# Patient Record
Sex: MALE | Race: BLACK OR AFRICAN AMERICAN | NOT HISPANIC OR LATINO | Employment: UNEMPLOYED | URBAN - NONMETROPOLITAN AREA
[De-identification: names, ages, dates, MRNs, and addresses within clinical notes are randomized per-mention and may not be internally consistent; named-entity substitution may affect disease eponyms.]

---

## 2022-03-14 ENCOUNTER — HISTORICAL (OUTPATIENT)
Dept: ADMINISTRATIVE | Facility: HOSPITAL | Age: 57
End: 2022-03-14

## 2023-05-16 ENCOUNTER — HOSPITAL ENCOUNTER (OUTPATIENT)
Dept: WOUND CARE | Facility: HOSPITAL | Age: 58
Discharge: HOME | End: 2023-05-16
Attending: NURSE PRACTITIONER
Payer: MEDICAID

## 2023-05-16 ENCOUNTER — HOSPITAL ENCOUNTER (INPATIENT)
Facility: HOSPITAL | Age: 58
LOS: 9 days | Discharge: NURSING, REHAB OR LONG-TERM CARE | DRG: 853 | End: 2023-05-25
Attending: FAMILY MEDICINE | Admitting: FAMILY MEDICINE
Payer: MEDICAID

## 2023-05-16 VITALS — HEART RATE: 104 BPM | DIASTOLIC BLOOD PRESSURE: 74 MMHG | RESPIRATION RATE: 18 BRPM | SYSTOLIC BLOOD PRESSURE: 144 MMHG

## 2023-05-16 DIAGNOSIS — L97.412 NON-PRESSURE CHRONIC ULCER OF RIGHT HEEL AND MIDFOOT WITH FAT LAYER EXPOSED: ICD-10-CM

## 2023-05-16 DIAGNOSIS — L97.412 NON-PRESSURE CHRONIC ULCER OF RIGHT HEEL AND MIDFOOT WITH FAT LAYER EXPOSED: Primary | ICD-10-CM

## 2023-05-16 DIAGNOSIS — R06.00 DYSPNEA: ICD-10-CM

## 2023-05-16 DIAGNOSIS — T25.021A BURN OF RIGHT FOOT, UNSPECIFIED BURN DEGREE, INITIAL ENCOUNTER: ICD-10-CM

## 2023-05-16 DIAGNOSIS — L97.412 ULCER OF RIGHT HEEL, WITH FAT LAYER EXPOSED: ICD-10-CM

## 2023-05-16 DIAGNOSIS — E08.621 DIABETIC ULCER OF MIDFOOT ASSOCIATED WITH DIABETES MELLITUS DUE TO UNDERLYING CONDITION, WITH FAT LAYER EXPOSED, UNSPECIFIED LATERALITY: ICD-10-CM

## 2023-05-16 DIAGNOSIS — I48.91 A-FIB: ICD-10-CM

## 2023-05-16 DIAGNOSIS — A41.9 SEPSIS: ICD-10-CM

## 2023-05-16 DIAGNOSIS — R00.0 TACHYCARDIA: ICD-10-CM

## 2023-05-16 DIAGNOSIS — A41.9 SEPSIS WITHOUT ACUTE ORGAN DYSFUNCTION, DUE TO UNSPECIFIED ORGANISM: ICD-10-CM

## 2023-05-16 DIAGNOSIS — L97.402 DIABETIC ULCER OF MIDFOOT ASSOCIATED WITH DIABETES MELLITUS DUE TO UNDERLYING CONDITION, WITH FAT LAYER EXPOSED, UNSPECIFIED LATERALITY: ICD-10-CM

## 2023-05-16 DIAGNOSIS — I96 GANGRENE: Primary | ICD-10-CM

## 2023-05-16 DIAGNOSIS — R06.02 SOB (SHORTNESS OF BREATH): ICD-10-CM

## 2023-05-16 DIAGNOSIS — T17.800A ASPIRATION INTO LOWER RESPIRATORY TRACT, INITIAL ENCOUNTER: ICD-10-CM

## 2023-05-16 LAB
ABS NEUT CALC (OHS): 10.02 X10(3)/MCL (ref 2.1–9.2)
ALBUMIN SERPL-MCNC: 3.1 G/DL (ref 3.4–5)
ALBUMIN/GLOB SERPL: 0.7 RATIO
ALP SERPL-CCNC: 269 UNIT/L (ref 50–144)
ALT SERPL-CCNC: 30 UNIT/L (ref 1–45)
ANION GAP SERPL CALC-SCNC: 5 MEQ/L (ref 2–13)
AST SERPL-CCNC: 30 UNIT/L (ref 17–59)
BASE EXCESS BLD CALC-SCNC: 5.9 MMOL/L (ref -2–2)
BILIRUBIN DIRECT+TOT PNL SERPL-MCNC: 0.6 MG/DL (ref 0–1)
BLOOD GAS SAMPLE TYPE (OHS): ABNORMAL
BUN SERPL-MCNC: 18 MG/DL (ref 7–20)
C-REACTIVE PROTEIN(NORTH LA, ST. MARY, RP & JENNINGS): 17.9 MG/DL (ref 0–0.9)
CALCIUM SERPL-MCNC: 9.1 MG/DL (ref 8.4–10.2)
CHLORIDE SERPL-SCNC: 102 MMOL/L (ref 98–110)
CO2 SERPL-SCNC: 31 MMOL/L (ref 21–32)
CREAT SERPL-MCNC: 0.73 MG/DL (ref 0.66–1.25)
CREAT/UREA NIT SERPL: 25 (ref 12–20)
ERYTHROCYTE [DISTWIDTH] IN BLOOD BY AUTOMATED COUNT: 22.8 %
FIO2 BLOOD GAS (OHS): 36 %
GFR SERPLBLD CREATININE-BSD FMLA CKD-EPI: >90 MLS/MIN/1.73/M2
GLOBULIN SER-MCNC: 4.3 GM/DL (ref 2–3.9)
GLUCOSE SERPL-MCNC: 126 MG/DL (ref 70–115)
HCO3 BLDA-SCNC: 29.6 MMOL/L (ref 22–26)
HCT VFR BLD AUTO: 28.5 % (ref 36–52)
HGB BLD-MCNC: 8.6 G/DL (ref 13–18)
IMM GRANULOCYTES # BLD AUTO: 0.06 X10(3)/MCL (ref 0–0.03)
IMM GRANULOCYTES NFR BLD AUTO: 0.5 % (ref 0–0.5)
LACTATE SERPL-SCNC: 1.4 MMOL/L (ref 0.4–2)
LPM (OHS): 4
LYMPHOCYTES NFR BLD MANUAL: 1.47 X10(3)/MCL
LYMPHOCYTES NFR BLD MANUAL: 12 % (ref 13–40)
MCH RBC QN AUTO: 24.3 PG (ref 27–34)
MCHC RBC AUTO-ENTMCNC: 30.2 G/DL (ref 31–37)
MCV RBC AUTO: 80.5 FL (ref 79–99)
METHGB MFR BLDA: ABNORMAL %
MONOCYTES NFR BLD MANUAL: 0.73 X10(3)/MCL (ref 0.1–1.3)
MONOCYTES NFR BLD MANUAL: 6 % (ref 2–11)
NEUTROPHILS NFR BLD MANUAL: 82 % (ref 47–80)
NRBC BLD AUTO-RTO: 0 %
OXYGEN DEVICE BLOOD GAS (OHS): ABNORMAL
PCO2 BLDA: 38.7 MMHG (ref 35–45)
PH BLDA: 7.49 [PH] (ref 7.35–7.45)
PLATELET # BLD AUTO: 595 X10(3)/MCL (ref 140–371)
PLATELET # BLD EST: ABNORMAL 10*3/UL
PMV BLD AUTO: ABNORMAL FL
PO2 BLDA: 52.1 MMHG (ref 80–105)
POIKILOCYTOSIS BLD QL SMEAR: ABNORMAL
POTASSIUM SERPL-SCNC: 3.9 MMOL/L (ref 3.5–5.1)
PROT SERPL-MCNC: 7.4 GM/DL (ref 6.3–8.2)
RBC # BLD AUTO: 3.54 X10(6)/MCL (ref 4–6)
RBC MORPH BLD: ABNORMAL
SAO2 % BLDA: 87.6 % (ref 95–100)
SODIUM SERPL-SCNC: 138 MMOL/L (ref 135–145)
WBC # SPEC AUTO: 12.22 X10(3)/MCL (ref 4–11.5)

## 2023-05-16 PROCEDURE — 80053 COMPREHEN METABOLIC PANEL: CPT | Performed by: FAMILY MEDICINE

## 2023-05-16 PROCEDURE — 85025 COMPLETE CBC W/AUTO DIFF WBC: CPT | Performed by: FAMILY MEDICINE

## 2023-05-16 PROCEDURE — 83605 ASSAY OF LACTIC ACID: CPT | Performed by: FAMILY MEDICINE

## 2023-05-16 PROCEDURE — 99203 OFFICE O/P NEW LOW 30 MIN: CPT | Mod: 25

## 2023-05-16 PROCEDURE — 96365 THER/PROPH/DIAG IV INF INIT: CPT

## 2023-05-16 PROCEDURE — 87186 SC STD MICRODIL/AGAR DIL: CPT | Performed by: FAMILY MEDICINE

## 2023-05-16 PROCEDURE — 96375 TX/PRO/DX INJ NEW DRUG ADDON: CPT

## 2023-05-16 PROCEDURE — 93010 EKG 12-LEAD: ICD-10-PCS | Mod: ,,, | Performed by: INTERNAL MEDICINE

## 2023-05-16 PROCEDURE — 25000003 PHARM REV CODE 250: Performed by: FAMILY MEDICINE

## 2023-05-16 PROCEDURE — 93010 ELECTROCARDIOGRAM REPORT: CPT | Mod: ,,, | Performed by: INTERNAL MEDICINE

## 2023-05-16 PROCEDURE — 86140 C-REACTIVE PROTEIN: CPT | Performed by: FAMILY MEDICINE

## 2023-05-16 PROCEDURE — 99285 EMERGENCY DEPT VISIT HI MDM: CPT | Mod: 25,27

## 2023-05-16 PROCEDURE — 63600175 PHARM REV CODE 636 W HCPCS: Performed by: FAMILY MEDICINE

## 2023-05-16 PROCEDURE — 27000999 HC MEDICAL RECORD PHOTO DOCUMENTATION

## 2023-05-16 PROCEDURE — 25500020 PHARM REV CODE 255: Performed by: FAMILY MEDICINE

## 2023-05-16 PROCEDURE — 87154 CUL TYP ID BLD PTHGN 6+ TRGT: CPT | Performed by: FAMILY MEDICINE

## 2023-05-16 PROCEDURE — 20000000 HC ICU ROOM

## 2023-05-16 PROCEDURE — 93005 ELECTROCARDIOGRAM TRACING: CPT

## 2023-05-16 PROCEDURE — 36415 COLL VENOUS BLD VENIPUNCTURE: CPT | Performed by: FAMILY MEDICINE

## 2023-05-16 PROCEDURE — 85651 RBC SED RATE NONAUTOMATED: CPT | Performed by: FAMILY MEDICINE

## 2023-05-16 PROCEDURE — 85027 COMPLETE CBC AUTOMATED: CPT | Performed by: FAMILY MEDICINE

## 2023-05-16 RX ORDER — CLONIDINE HYDROCHLORIDE 0.1 MG/1
0.1 TABLET ORAL EVERY 4 HOURS PRN
Status: ACTIVE | OUTPATIENT
Start: 2023-05-16 | End: 2023-05-17

## 2023-05-16 RX ORDER — ONDANSETRON 2 MG/ML
8 INJECTION INTRAMUSCULAR; INTRAVENOUS
Status: COMPLETED | OUTPATIENT
Start: 2023-05-16 | End: 2023-05-16

## 2023-05-16 RX ORDER — GLUCAGON 1 MG
1 KIT INJECTION
Status: DISCONTINUED | OUTPATIENT
Start: 2023-05-16 | End: 2023-05-17

## 2023-05-16 RX ORDER — CLINDAMYCIN PHOSPHATE 900 MG/50ML
900 INJECTION, SOLUTION INTRAVENOUS
Status: DISCONTINUED | OUTPATIENT
Start: 2023-05-16 | End: 2023-05-17

## 2023-05-16 RX ORDER — ACETAMINOPHEN 325 MG/1
650 TABLET ORAL EVERY 8 HOURS PRN
Status: DISCONTINUED | OUTPATIENT
Start: 2023-05-16 | End: 2023-05-25 | Stop reason: HOSPADM

## 2023-05-16 RX ORDER — ONDANSETRON 2 MG/ML
4 INJECTION INTRAMUSCULAR; INTRAVENOUS EVERY 6 HOURS PRN
Status: DISCONTINUED | OUTPATIENT
Start: 2023-05-16 | End: 2023-05-25 | Stop reason: HOSPADM

## 2023-05-16 RX ORDER — PROCHLORPERAZINE EDISYLATE 5 MG/ML
10 INJECTION INTRAMUSCULAR; INTRAVENOUS EVERY 6 HOURS PRN
Status: DISCONTINUED | OUTPATIENT
Start: 2023-05-16 | End: 2023-05-25 | Stop reason: HOSPADM

## 2023-05-16 RX ORDER — TALC
6 POWDER (GRAM) TOPICAL NIGHTLY PRN
Status: DISCONTINUED | OUTPATIENT
Start: 2023-05-16 | End: 2023-05-25 | Stop reason: HOSPADM

## 2023-05-16 RX ORDER — ACETAMINOPHEN 500 MG
1000 TABLET ORAL
Status: COMPLETED | OUTPATIENT
Start: 2023-05-16 | End: 2023-05-16

## 2023-05-16 RX ORDER — KETOROLAC TROMETHAMINE 30 MG/ML
30 INJECTION, SOLUTION INTRAMUSCULAR; INTRAVENOUS
Status: COMPLETED | OUTPATIENT
Start: 2023-05-16 | End: 2023-05-16

## 2023-05-16 RX ORDER — SODIUM CHLORIDE 450 MG/100ML
INJECTION, SOLUTION INTRAVENOUS
Status: COMPLETED | OUTPATIENT
Start: 2023-05-16 | End: 2023-05-16

## 2023-05-16 RX ADMIN — SODIUM CHLORIDE: 4.5 INJECTION, SOLUTION INTRAVENOUS at 11:05

## 2023-05-16 RX ADMIN — SODIUM CHLORIDE 1000 ML: 9 INJECTION, SOLUTION INTRAVENOUS at 05:05

## 2023-05-16 RX ADMIN — ONDANSETRON 8 MG: 2 INJECTION INTRAMUSCULAR; INTRAVENOUS at 03:05

## 2023-05-16 RX ADMIN — PIPERACILLIN AND TAZOBACTAM 4.5 G: 4; .5 INJECTION, POWDER, FOR SOLUTION INTRAVENOUS; PARENTERAL at 03:05

## 2023-05-16 RX ADMIN — KETOROLAC TROMETHAMINE 30 MG: 30 INJECTION, SOLUTION INTRAMUSCULAR at 04:05

## 2023-05-16 RX ADMIN — SODIUM CHLORIDE 1000 ML: 9 INJECTION, SOLUTION INTRAVENOUS at 03:05

## 2023-05-16 RX ADMIN — CLINDAMYCIN PHOSPHATE 900 MG: 900 INJECTION, SOLUTION INTRAVENOUS at 09:05

## 2023-05-16 RX ADMIN — IOPAMIDOL 92 ML: 755 INJECTION, SOLUTION INTRAVENOUS at 06:05

## 2023-05-16 RX ADMIN — ACETAMINOPHEN 1000 MG: 500 TABLET, FILM COATED ORAL at 03:05

## 2023-05-16 RX ADMIN — PIPERACILLIN AND TAZOBACTAM 4.5 G: 4; .5 INJECTION, POWDER, FOR SOLUTION INTRAVENOUS; PARENTERAL at 11:05
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
## 2023-05-17 ENCOUNTER — ANESTHESIA EVENT (OUTPATIENT)
Dept: SURGERY | Facility: HOSPITAL | Age: 58
DRG: 853 | End: 2023-05-17
Payer: MEDICAID

## 2023-05-17 ENCOUNTER — ANESTHESIA (OUTPATIENT)
Dept: SURGERY | Facility: HOSPITAL | Age: 58
DRG: 853 | End: 2023-05-17
Payer: MEDICAID

## 2023-05-17 PROBLEM — I96 GANGRENE: Status: ACTIVE | Noted: 2023-05-17

## 2023-05-17 LAB
ABO + RH BLD: NORMAL
ABO AND RH: NORMAL
ABORH RETYPE: NORMAL
ACINETOBACTER CALCOACETICUS-BAUMANNII COMPLEX (OHS): NOT DETECTED
ALBUMIN SERPL-MCNC: 2.3 G/DL (ref 3.4–5)
ALBUMIN/GLOB SERPL: 0.7 RATIO
ALP SERPL-CCNC: 239 UNIT/L (ref 50–144)
ALT SERPL-CCNC: 27 UNIT/L (ref 1–45)
ANION GAP SERPL CALC-SCNC: 4 MEQ/L (ref 2–13)
ANISOCYTOSIS BLD QL SMEAR: ABNORMAL
ANTIBODY SCREEN: NORMAL
AST SERPL-CCNC: 34 UNIT/L (ref 17–59)
BACTEROIDES FRAGILIS (OHS): NOT DETECTED
BASOPHILS # BLD AUTO: 0.05 X10(3)/MCL (ref 0.01–0.08)
BASOPHILS NFR BLD AUTO: 0.3 % (ref 0.1–1.2)
BILIRUBIN DIRECT+TOT PNL SERPL-MCNC: 0.5 MG/DL (ref 0–1)
BLD PROD TYP BPU: NORMAL
BLOOD UNIT EXPIRATION DATE: NORMAL
BLOOD UNIT TYPE CODE: 6200
BUN SERPL-MCNC: 18 MG/DL (ref 7–20)
C AURIS DNA BLD POS QL NAA+NON-PROBE: NOT DETECTED
C GATTII+NEOFOR DNA CSF QL NAA+NON-PROBE: NOT DETECTED
C-REACTIVE PROTEIN(NORTH LA, ST. MARY, RP & JENNINGS): 17.5 MG/DL (ref 0–0.9)
CALCIUM SERPL-MCNC: 8 MG/DL (ref 8.4–10.2)
CANDIDA ALBICANS (OHS): NOT DETECTED
CANDIDA GLABRATA (OHS): NOT DETECTED
CANDIDA KRUSEI (OHS): NOT DETECTED
CANDIDA PARAPSILOSIS (OHS): NOT DETECTED
CANDIDA TROPICALIS (OHS): NOT DETECTED
CHLORIDE SERPL-SCNC: 103 MMOL/L (ref 98–110)
CO2 SERPL-SCNC: 30 MMOL/L (ref 21–32)
CREAT SERPL-MCNC: 0.84 MG/DL (ref 0.66–1.25)
CREAT/UREA NIT SERPL: 21 (ref 12–20)
CROSSMATCH INTERPRETATION: NORMAL
CTX-M (OHS): NOT DETECTED
DISPENSE STATUS: NORMAL
ENTEROBACTER CLOACAE COMPLEX (OHS): NOT DETECTED
ENTEROBACTERALES (OHS): DETECTED
ENTEROCOCCUS FAECALIS (OHS): NOT DETECTED
ENTEROCOCCUS FAECIUM (OHS): NOT DETECTED
EOSINOPHIL # BLD AUTO: 0.11 X10(3)/MCL (ref 0.04–0.54)
EOSINOPHIL NFR BLD AUTO: 0.7 % (ref 0.7–7)
ERYTHROCYTE [DISTWIDTH] IN BLOOD BY AUTOMATED COUNT: 22.8 %
ERYTHROCYTE [SEDIMENTATION RATE] IN BLOOD: 95 MM/HR (ref 0–15)
ESCHERICHIA COLI (OHS): NOT DETECTED
EST. AVERAGE GLUCOSE BLD GHB EST-MCNC: 111.2 MG/DL (ref 70–115)
GFR SERPLBLD CREATININE-BSD FMLA CKD-EPI: >90 MLS/MIN/1.73/M2
GLOBULIN SER-MCNC: 3.4 GM/DL (ref 2–3.9)
GLUCOSE SERPL-MCNC: 187 MG/DL (ref 70–115)
GP B STREP DNA CSF QL NAA+NON-PROBE: NOT DETECTED
HAEM INFLU DNA CSF QL NAA+NON-PROBE: NOT DETECTED
HBA1C MFR BLD: 5.5 % (ref 4–6)
HCT VFR BLD AUTO: 24.4 % (ref 36–52)
HGB BLD-MCNC: 7.3 G/DL (ref 13–18)
IMM GRANULOCYTES # BLD AUTO: 0.08 X10(3)/MCL (ref 0–0.03)
IMM GRANULOCYTES NFR BLD AUTO: 0.5 % (ref 0–0.5)
IMP (OHS): NOT DETECTED
KLEBSIELLA AEROGENES (OHS): NOT DETECTED
KLEBSIELLA OXYTOCA (OHS): NOT DETECTED
KLEBSIELLA PNEUMONIAE GROUP (OHS): NOT DETECTED
KPC (OHS): NOT DETECTED
L MONOCYTOG DNA CSF QL NAA+NON-PROBE: NOT DETECTED
LYMPHOCYTES # BLD AUTO: 1 X10(3)/MCL (ref 1.32–3.57)
LYMPHOCYTES NFR BLD AUTO: 5.9 % (ref 20–55)
MCH RBC QN AUTO: 24.3 PG (ref 27–34)
MCHC RBC AUTO-ENTMCNC: 29.9 G/DL (ref 31–37)
MCR-1 (OHS): ABNORMAL
MCV RBC AUTO: 81.3 FL (ref 79–99)
MECA/C (OHS): ABNORMAL
MECA/C AND MREJ (MRSA)(OHS): ABNORMAL
MONOCYTES # BLD AUTO: 0.76 X10(3)/MCL (ref 0.3–0.82)
MONOCYTES NFR BLD AUTO: 4.5 % (ref 4.7–12.5)
N MEN DNA CSF QL NAA+NON-PROBE: NOT DETECTED
NDM (OHS): NOT DETECTED
NEUTROPHILS # BLD AUTO: 14.83 X10(3)/MCL (ref 1.78–5.38)
NEUTROPHILS NFR BLD AUTO: 88.1 % (ref 37–73)
NRBC BLD AUTO-RTO: 0 %
OXA-48-LIKE (OHS): NOT DETECTED
PLATELET # BLD AUTO: 499 X10(3)/MCL (ref 140–371)
PLATELET # BLD EST: ABNORMAL 10*3/UL
PMV BLD AUTO: ABNORMAL FL
POCT GLUCOSE: 124 MG/DL (ref 70–110)
POCT GLUCOSE: 145 MG/DL (ref 70–110)
POCT GLUCOSE: 191 MG/DL (ref 70–110)
POIKILOCYTOSIS BLD QL SMEAR: ABNORMAL
POTASSIUM SERPL-SCNC: 3.7 MMOL/L (ref 3.5–5.1)
PROT SERPL-MCNC: 5.7 GM/DL (ref 6.3–8.2)
PROTEUS SPP. (OHS): DETECTED
PSEUDOMONAS AERUGINOSA (OHS): NOT DETECTED
RBC # BLD AUTO: 3 X10(6)/MCL (ref 4–6)
S ENT+BONG DNA STL QL NAA+NON-PROBE: NOT DETECTED
S PNEUM DNA CSF QL NAA+NON-PROBE: NOT DETECTED
SERRATIA MARCESCENS (OHS): NOT DETECTED
SODIUM SERPL-SCNC: 137 MMOL/L (ref 135–145)
SPECIMEN OUTDATE: NORMAL
STAPHYLOCOCCUS AUREUS (OHS): NOT DETECTED
STAPHYLOCOCCUS EPIDERMIDIS (OHS): NOT DETECTED
STAPHYLOCOCCUS LUGDUNENSIS (OHS): NOT DETECTED
STAPHYLOCOCCUS SPP. (OHS): NOT DETECTED
STENOTROPHOMONAS MALTOPHILIA (OHS): NOT DETECTED
STREPTOCOCCUS PYOGENES (GROUP A)(OHS): NOT DETECTED
STREPTOCOCCUS SPP. (OHS): NOT DETECTED
TSH SERPL-ACNC: 2.05 UIU/ML (ref 0.36–3.74)
UNIT NUMBER: NORMAL
VANA/B (OHS): ABNORMAL
VIM (OHS): NOT DETECTED
WBC # SPEC AUTO: 16.83 X10(3)/MCL (ref 4–11.5)

## 2023-05-17 PROCEDURE — 63600175 PHARM REV CODE 636 W HCPCS: Performed by: FAMILY MEDICINE

## 2023-05-17 PROCEDURE — 99900035 HC TECH TIME PER 15 MIN (STAT)

## 2023-05-17 PROCEDURE — D9220A PRA ANESTHESIA: Mod: ,,, | Performed by: NURSE ANESTHETIST, CERTIFIED REGISTERED

## 2023-05-17 PROCEDURE — 37000009 HC ANESTHESIA EA ADD 15 MINS: Performed by: SURGERY

## 2023-05-17 PROCEDURE — 21400001 HC TELEMETRY ROOM

## 2023-05-17 PROCEDURE — 86920 COMPATIBILITY TEST SPIN: CPT | Performed by: FAMILY MEDICINE

## 2023-05-17 PROCEDURE — 25000003 PHARM REV CODE 250: Performed by: NURSE ANESTHETIST, CERTIFIED REGISTERED

## 2023-05-17 PROCEDURE — 86920 COMPATIBILITY TEST SPIN: CPT | Performed by: SURGERY

## 2023-05-17 PROCEDURE — 80053 COMPREHEN METABOLIC PANEL: CPT | Performed by: FAMILY MEDICINE

## 2023-05-17 PROCEDURE — 25000003 PHARM REV CODE 250: Performed by: FAMILY MEDICINE

## 2023-05-17 PROCEDURE — 36415 COLL VENOUS BLD VENIPUNCTURE: CPT | Performed by: SURGERY

## 2023-05-17 PROCEDURE — 36000706: Performed by: SURGERY

## 2023-05-17 PROCEDURE — 94799 UNLISTED PULMONARY SVC/PX: CPT

## 2023-05-17 PROCEDURE — 83036 HEMOGLOBIN GLYCOSYLATED A1C: CPT | Performed by: SURGERY

## 2023-05-17 PROCEDURE — 36000707: Performed by: SURGERY

## 2023-05-17 PROCEDURE — 27201423 OPTIME MED/SURG SUP & DEVICES STERILE SUPPLY: Performed by: SURGERY

## 2023-05-17 PROCEDURE — 86140 C-REACTIVE PROTEIN: CPT | Performed by: FAMILY MEDICINE

## 2023-05-17 PROCEDURE — 87070 CULTURE OTHR SPECIMN AEROBIC: CPT | Performed by: FAMILY MEDICINE

## 2023-05-17 PROCEDURE — 27000221 HC OXYGEN, UP TO 24 HOURS

## 2023-05-17 PROCEDURE — 87205 SMEAR GRAM STAIN: CPT | Performed by: SURGERY

## 2023-05-17 PROCEDURE — 87070 CULTURE OTHR SPECIMN AEROBIC: CPT | Performed by: SURGERY

## 2023-05-17 PROCEDURE — 94761 N-INVAS EAR/PLS OXIMETRY MLT: CPT

## 2023-05-17 PROCEDURE — 63600175 PHARM REV CODE 636 W HCPCS: Performed by: SURGERY

## 2023-05-17 PROCEDURE — 87075 CULTR BACTERIA EXCEPT BLOOD: CPT | Performed by: SURGERY

## 2023-05-17 PROCEDURE — 36415 COLL VENOUS BLD VENIPUNCTURE: CPT | Performed by: FAMILY MEDICINE

## 2023-05-17 PROCEDURE — 84443 ASSAY THYROID STIM HORMONE: CPT | Performed by: SURGERY

## 2023-05-17 PROCEDURE — 86900 BLOOD TYPING SEROLOGIC ABO: CPT | Performed by: SURGERY

## 2023-05-17 PROCEDURE — 25000003 PHARM REV CODE 250: Performed by: SURGERY

## 2023-05-17 PROCEDURE — P9016 RBC LEUKOCYTES REDUCED: HCPCS | Performed by: SURGERY

## 2023-05-17 PROCEDURE — 85025 COMPLETE CBC W/AUTO DIFF WBC: CPT | Performed by: FAMILY MEDICINE

## 2023-05-17 PROCEDURE — 63600175 PHARM REV CODE 636 W HCPCS: Performed by: NURSE ANESTHETIST, CERTIFIED REGISTERED

## 2023-05-17 PROCEDURE — 37000008 HC ANESTHESIA 1ST 15 MINUTES: Performed by: SURGERY

## 2023-05-17 PROCEDURE — D9220A PRA ANESTHESIA: ICD-10-PCS | Mod: ,,, | Performed by: NURSE ANESTHETIST, CERTIFIED REGISTERED

## 2023-05-17 RX ORDER — MIDAZOLAM HYDROCHLORIDE 1 MG/ML
2 INJECTION INTRAMUSCULAR; INTRAVENOUS
Status: COMPLETED | OUTPATIENT
Start: 2023-05-17 | End: 2023-05-17

## 2023-05-17 RX ORDER — MUPIROCIN 20 MG/G
OINTMENT TOPICAL 2 TIMES DAILY
Status: DISCONTINUED | OUTPATIENT
Start: 2023-05-17 | End: 2023-05-17

## 2023-05-17 RX ORDER — HYDROCODONE BITARTRATE AND ACETAMINOPHEN 500; 5 MG/1; MG/1
TABLET ORAL
Status: DISCONTINUED | OUTPATIENT
Start: 2023-05-17 | End: 2023-05-25

## 2023-05-17 RX ORDER — ACETAMINOPHEN 325 MG/1
650 TABLET ORAL EVERY 8 HOURS PRN
Qty: 1 TABLET | Refills: 0 | Status: SHIPPED | OUTPATIENT
Start: 2023-05-17

## 2023-05-17 RX ORDER — LIDOCAINE HYDROCHLORIDE 10 MG/ML
INJECTION, SOLUTION EPIDURAL; INFILTRATION; INTRACAUDAL; PERINEURAL
Status: COMPLETED | OUTPATIENT
Start: 2023-05-17 | End: 2023-05-17

## 2023-05-17 RX ORDER — IBUPROFEN 200 MG
24 TABLET ORAL
Status: DISCONTINUED | OUTPATIENT
Start: 2023-05-17 | End: 2023-05-21

## 2023-05-17 RX ORDER — SODIUM CHLORIDE 9 MG/ML
INJECTION, SOLUTION INTRAVENOUS CONTINUOUS
Status: DISCONTINUED | OUTPATIENT
Start: 2023-05-17 | End: 2023-05-21

## 2023-05-17 RX ORDER — IBUPROFEN 200 MG
16 TABLET ORAL
Status: DISCONTINUED | OUTPATIENT
Start: 2023-05-17 | End: 2023-05-21

## 2023-05-17 RX ORDER — MUPIROCIN 20 MG/G
1 OINTMENT TOPICAL 2 TIMES DAILY
Status: DISPENSED | OUTPATIENT
Start: 2023-05-17 | End: 2023-05-22

## 2023-05-17 RX ORDER — FAMOTIDINE 20 MG/1
20 TABLET, FILM COATED ORAL
Status: COMPLETED | OUTPATIENT
Start: 2023-05-17 | End: 2023-05-17

## 2023-05-17 RX ORDER — ONDANSETRON 2 MG/ML
INJECTION INTRAMUSCULAR; INTRAVENOUS
Status: DISCONTINUED | OUTPATIENT
Start: 2023-05-17 | End: 2023-05-17

## 2023-05-17 RX ORDER — GLUCAGON 1 MG
1 KIT INJECTION
Status: DISCONTINUED | OUTPATIENT
Start: 2023-05-17 | End: 2023-05-21

## 2023-05-17 RX ORDER — LIDOCAINE HYDROCHLORIDE 10 MG/ML
INJECTION INFILTRATION; PERINEURAL
Status: DISCONTINUED | OUTPATIENT
Start: 2023-05-17 | End: 2023-05-17 | Stop reason: HOSPADM

## 2023-05-17 RX ORDER — TRANEXAMIC ACID 100 MG/ML
INJECTION, SOLUTION INTRAVENOUS
Status: DISCONTINUED | OUTPATIENT
Start: 2023-05-17 | End: 2023-05-17

## 2023-05-17 RX ORDER — PROPOFOL 10 MG/ML
VIAL (ML) INTRAVENOUS
Status: DISCONTINUED | OUTPATIENT
Start: 2023-05-17 | End: 2023-05-17

## 2023-05-17 RX ORDER — FENTANYL CITRATE 50 UG/ML
INJECTION, SOLUTION INTRAMUSCULAR; INTRAVENOUS
Status: DISCONTINUED | OUTPATIENT
Start: 2023-05-17 | End: 2023-05-17

## 2023-05-17 RX ORDER — BUPIVACAINE HYDROCHLORIDE 5 MG/ML
INJECTION, SOLUTION EPIDURAL; INTRACAUDAL
Status: COMPLETED | OUTPATIENT
Start: 2023-05-17 | End: 2023-05-17

## 2023-05-17 RX ORDER — ETOMIDATE 2 MG/ML
INJECTION INTRAVENOUS
Status: DISCONTINUED | OUTPATIENT
Start: 2023-05-17 | End: 2023-05-17

## 2023-05-17 RX ORDER — BUPIVACAINE HYDROCHLORIDE 5 MG/ML
INJECTION, SOLUTION EPIDURAL; INTRACAUDAL
Status: DISCONTINUED | OUTPATIENT
Start: 2023-05-17 | End: 2023-05-17 | Stop reason: HOSPADM

## 2023-05-17 RX ORDER — INSULIN ASPART 100 [IU]/ML
1-10 INJECTION, SOLUTION INTRAVENOUS; SUBCUTANEOUS
Status: DISCONTINUED | OUTPATIENT
Start: 2023-05-17 | End: 2023-05-21

## 2023-05-17 RX ORDER — LIDOCAINE HYDROCHLORIDE 20 MG/ML
INJECTION INTRAVENOUS
Status: DISCONTINUED | OUTPATIENT
Start: 2023-05-17 | End: 2023-05-17

## 2023-05-17 RX ADMIN — PROPOFOL 30 MG: 10 INJECTION, EMULSION INTRAVENOUS at 02:05

## 2023-05-17 RX ADMIN — TRANEXAMIC ACID 1000 MG: 100 INJECTION, SOLUTION INTRAVENOUS at 03:05

## 2023-05-17 RX ADMIN — ETOMIDATE 2 MG: 2 INJECTION INTRAVENOUS at 02:05

## 2023-05-17 RX ADMIN — VANCOMYCIN HYDROCHLORIDE 2000 MG: 1 INJECTION, POWDER, LYOPHILIZED, FOR SOLUTION INTRAVENOUS at 10:05

## 2023-05-17 RX ADMIN — CLINDAMYCIN PHOSPHATE 900 MG: 900 INJECTION, SOLUTION INTRAVENOUS at 04:05

## 2023-05-17 RX ADMIN — BUPIVACAINE HYDROCHLORIDE 10 ML: 5 INJECTION, SOLUTION EPIDURAL; INTRACAUDAL; PERINEURAL at 02:05

## 2023-05-17 RX ADMIN — FENTANYL CITRATE 50 MCG: 50 INJECTION, SOLUTION INTRAMUSCULAR; INTRAVENOUS at 02:05

## 2023-05-17 RX ADMIN — ETOMIDATE 4 MG: 2 INJECTION INTRAVENOUS at 02:05

## 2023-05-17 RX ADMIN — MIDAZOLAM HYDROCHLORIDE 2 MG: 1 INJECTION, SOLUTION INTRAMUSCULAR; INTRAVENOUS at 02:05

## 2023-05-17 RX ADMIN — GLYCOPYRROLATE 0.2 MG: 0.2 INJECTION, SOLUTION INTRAMUSCULAR; INTRAVITREAL at 02:05

## 2023-05-17 RX ADMIN — LIDOCAINE HYDROCHLORIDE 100 MG: 10 INJECTION, SOLUTION EPIDURAL; INFILTRATION; INTRACAUDAL at 02:05

## 2023-05-17 RX ADMIN — PIPERACILLIN AND TAZOBACTAM 4.5 G: 4; .5 INJECTION, POWDER, FOR SOLUTION INTRAVENOUS; PARENTERAL at 10:05

## 2023-05-17 RX ADMIN — MUPIROCIN: 20 OINTMENT TOPICAL at 10:05

## 2023-05-17 RX ADMIN — BUPIVACAINE HYDROCHLORIDE 5 ML: 5 INJECTION, SOLUTION EPIDURAL; INTRACAUDAL; PERINEURAL at 02:05

## 2023-05-17 RX ADMIN — ONDANSETRON 4 MG: 2 INJECTION INTRAMUSCULAR; INTRAVENOUS at 02:05

## 2023-05-17 RX ADMIN — COLLAGENASE SANTYL: 250 OINTMENT TOPICAL at 09:05

## 2023-05-17 RX ADMIN — VANCOMYCIN HYDROCHLORIDE 1750 MG: 750 INJECTION, POWDER, LYOPHILIZED, FOR SOLUTION INTRAVENOUS at 10:05

## 2023-05-17 RX ADMIN — SODIUM CHLORIDE: 9 INJECTION, SOLUTION INTRAVENOUS at 04:05

## 2023-05-17 RX ADMIN — PIPERACILLIN AND TAZOBACTAM 4.5 G: 4; .5 INJECTION, POWDER, FOR SOLUTION INTRAVENOUS; PARENTERAL at 11:05

## 2023-05-17 RX ADMIN — FAMOTIDINE 20 MG: 20 TABLET ORAL at 01:05

## 2023-05-17 RX ADMIN — LIDOCAINE HYDROCHLORIDE 50 MG: 20 INJECTION, SOLUTION INTRAVENOUS at 02:05

## 2023-05-17 RX ADMIN — PIPERACILLIN AND TAZOBACTAM 4.5 G: 4; .5 INJECTION, POWDER, FOR SOLUTION INTRAVENOUS; PARENTERAL at 04:05

## 2023-05-17 NOTE — PLAN OF CARE
Physician ordered to consult for LTAC. Referral made to [facility] per phone/fax, spoke with Kay

## 2023-05-17 NOTE — ED NOTES
Spoke w/Dr Duckworth via telephone, notified him that Dr Ashyb will be unable to see this patient tomorrow, he requested a [facility] telecardiology consult prior to going to ICU for cardiac clearance and possible intervention

## 2023-05-17 NOTE — PROGRESS NOTES
Pharmacokinetic Initial Assessment: IV Vancomycin    Assessment/Plan:    Initiate intravenous vancomycin with loading dose of 2000 mg once followed by a maintenance dose of vancomycin 1750mg IV every 12 hours  Desired empiric serum trough concentration is 15 to 20 mcg/mL  Draw vancomycin trough level 60 min prior to fourth dose on 05/18/23 at approximately 2100  Pharmacy will continue to follow and monitor vancomycin.      Please contact pharmacy at extension [phone] with any questions regarding this assessment.     Thank you for the consult,   Tiny Smith       Patient brief summary:  Honorio Robb is a 57 y.o. male initiated on antimicrobial therapy with IV Vancomycin for treatment of suspected skin & soft tissue infection/gangrene right foot    Drug Allergies:   Review of patient's allergies indicates:  No Known Allergies    Actual Body Weight:   90.6kg    Renal Function:   Estimated Creatinine Clearance: 112.4 mL/min (based on SCr of 0.84 mg/dL).,     Dialysis Method (if applicable):  N/A    CBC (last 72 hours):  Recent Labs   Lab Result Units 05/16/23  1539 05/17/23  0423   WBC x10(3)/mcL 12.22* 16.83*   Hgb g/dL 8.6* 7.3*   Hct % 28.5* 24.4*   Platelet x10(3)/mcL 595* 499*   Mono % %  --  4.5*   Monocyte Man % 6  --    Eos % %  --  0.7   Basophil % %  --  0.3       Metabolic Panel (last 72 hours):  Recent Labs   Lab Result Units 05/16/23  1539 05/17/23  0423   Sodium Level mmol/L 138 137   Potassium Level mmol/L 3.9 3.7   Chloride mmol/L 102 103   Carbon Dioxide mmol/L 31 30   Glucose Level mg/dL 126* 187*   Blood Urea Nitrogen mg/dL 18.0 18.0   Creatinine mg/dL 0.73 0.84   Albumin Level g/dL 3.1* 2.3*   Bilirubin Total mg/dL 0.6 0.5   Alkaline Phosphatase unit/L 269* 239*   Aspartate Aminotransferase unit/L 30 34   Alanine Aminotransferase unit/L 30 27       Drug levels (last 3 results):  No results for input(s): VANCOMYCINRA, VANCORANDOM, VANCOMYCINPE, VANCOPEAK, VANCOMYCINTR, VANCOTROUGH in the last 72  hours.    Microbiologic Results:  Microbiology Results (last 7 days)       Procedure Component Value Units Date/Time    Blood Culture [[id number]]  (Abnormal) Collected: 05/16/23 1539    Order Status: Completed Specimen: Blood from Arm, Right Updated: 05/17/23 0836     GRAM STAIN 2 of 2 bottles positive      Gram negative bacilli      Seen in gram stain of broth only    Blood Culture [[id number]]  (Abnormal) Collected: 05/16/23 1539    Order Status: Completed Specimen: Blood from Antecubital, Left Updated: 05/17/23 0835     GRAM STAIN 2 of 2 bottles positive      Seen in gram stain of broth only      Gram negative bacilli    Narrative:      Called to [facility]/RB/ES 0828 will notify md      BCID2 Panel [[id number]]  (Abnormal) Collected: 05/16/23 1539    Order Status: Completed Specimen: Blood from Antecubital, Left Updated: 05/17/23 0832     CTX-M (ESBL ) Not Detected     IMP (Cabapenemase ) Not Detected     KPC resistance gene (Carbapenemase ) Not Detected     mcr-1 N/A     mecA ID N/A     mecA/C and MREJ (MRSA) gene N/A     NDM (Carbapenemase ) Not Detected     OXA-48-like (Carbapenemase ) Not Detected     Lokesh/B (VRE gene) N/A     VIM (Carbapenemase ) Not Detected     Enterococcus faecalis Not Detected     Enterococcus faecium Not Detected     Listeria monocytogenes Not Detected     Staphylococcus spp. Not Detected     Staphylococcus aureus Not Detected     Staphylococcus epidermidis Not Detected     Staphylococcus lugdunensis Not Detected     Streptococcus spp. Not Detected     Streptococcus agalactiae (Group B) Not Detected     Streptococcus pneumoniae Not Detected     Streptococcus pyogenes (Group A) Not Detected     Acinetobacter calcoaceticus/baumannii complex Not Detected     Bacteroides fragilis Not Detected     Enterobacterales Detected     Enterobacter cloacae complex Not Detected     Escherichia coli Not Detected     Klebsiella aerogenes Not Detected      Klebsiella oxytoca Not Detected     Klebsiella pneumoniae group Not Detected     Proteus spp. Detected     Salmonella spp. Not Detected     Serratia marcescens Not Detected     Haemophilus influenzae Not Detected     Neisseria meningitidis Not Detected     Pseudomonas aeruginosa Not Detected     Stenotrophomonas maltophilia Not Detected     Candida albicans Not Detected     Candida auris Not Detected     Candida glabrata Not Detected     Candida krusei Not Detected     Candida parapsilosis Not Detected     Candida tropicalis Not Detected     Cryptococcus neoformans/gattii Not Detected    Narrative:      The [employer] BCID2 Panel is a multiplexed nucleic acid test intended for the use with [employer]® 2.0 or [employer]iver® [employer] for the simultaneous qualitative detection and identification of multiple bacterial and yeast nucleic acids and select genetic determinants associated with antimicrobial resistance.  The [employer] BCID2 Panel test is performed directly on blood culture samples identified as positive by a continuous monitoring blood culture system.  Results are intended to be interpreted in conjunction with Gram stain results.
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
## 2023-05-17 NOTE — CONSULTS
Reason for the consultation: Preoperative cardiac clearance prior to surgery for gangrene of the right foot.       HPI:  The patient came to the ER for evaluation of right foot pain.   Onset:  2 weeks ago, progressively worse  Intensity:  severe  Aggravating factors:  Pain at rest  Alleviating factors:  None  Associated symptoms:  purulent discharge, fever and chills     Troponin in the ER: None  EKG showed: Normal sinus rhythm, left ventricular hypertrophy with secondary repolarization pattern. Abnormal EKG      Social history: Denies tobacco use, denies excessive Alcohol use, denies illegal drug use.   Surgical history: Prior left BKA  Family history: Positive for Diabetes Mellitus   Allergy: NKDA    ROS: As per HPI, the rest of the ROS were reviewed and are negative.      Physical examination:    Patient is alert and oriented x 3, in no acute distress  vital signs: Reviewed by me.  CV: regular rhythm and tachycardic rate. No murmur, rubs, or gallops.  Lungs: Clear to auscultation bilaterally. No wheezing. No crackles.  Extremities: Right foot wrapped with dressing. According to the ER physician and his nurse, there were purulent discharge from the right foot.        Labs: reviewed       Assessment:    Preoperative cardiac clearance prior to right foot surgery for gangrene of the right foot. Revised cardiac Risk Index (Talib Criteria) is  0.9%.   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 The patient is at low risk for Major Adverse Cardiovascular Events (MACE). The patient appears to be refusing right foot amputation and only wants I&D as well as IV antibiotics of the right foot.    Arterial duplex US is done,  results is pending. Dr. Ashby is not available for the next days, so an invasive peripheral angiogram is not feasible by [facility] at [place]. If needed, the patient will need to be transferred to another cath capable facility.    Blood cultures pending    Diabetes Mellitus management and IV antibiotics managed by primary admitting team.     Echocardiogram to assess LV function and wall motion abnormalities.     Thank you for letting me participate in the care of your patient.

## 2023-05-17 NOTE — ANESTHESIA PROCEDURE NOTES
Peripheral Block    Patient location during procedure: OR    Reason for block: primary anesthetic    Diagnosis: Gangrenous diabetic foot Rt   Start time: 5/17/2023 2:20 PM  Timeout: 5/17/2023 2:12 PM   End time: 5/17/2023 2:28 PM    Staffing  Authorizing Provider: Tyrone Franks CRNA  Performing Provider: Tyrone Franks CRNA    Preanesthetic Checklist  Completed: patient identified, IV checked, site marked, risks and benefits discussed, surgical consent, monitors and equipment checked, pre-op evaluation and timeout performed  Peripheral Block  Patient position: supine  Prep: alcohol  Patient monitoring: heart rate, cardiac monitor, continuous pulse ox, continuous capnometry and frequent blood pressure checks  Block type: ankle - saphenous, ankle - superficial peroneal, ankle - sural and ankle - tibial  Laterality: right  Injection technique: single shot  Needle  Needle type: Short-bevel   Needle gauge: 22 G  Needle length: 2 in  Needle localization: anatomical landmarks  Needle insertion depth: 1.5 cm     Assessment  Injection assessment: negative aspiration  Paresthesia pain: none  Heart rate change: no  Slow fractionated injection: yes  Pain Tolerance: comfortable throughout block  Medications:    Medications: bupivacaine (pf) (MARCAINE) injection 0.5% - Perineural, Right Ankle   10 mL - 5/17/2023 2:25:00 PM   5 mL - 5/17/2023 2:26:00 PM  lidocaine (PF) injection 1% - Other, Right Ankle   100 mg - 5/17/2023 2:25:00 PM    Additional Notes  Rt ankle prepped with alcohol solution drapped. 10ccs marcaine 0.5% plain and Lidocaine 1% 10cc ankle block. Tolerated without incident

## 2023-05-17 NOTE — CONSULTS
Patient referred to the emergency room by wound care.  He had a   burn on his right foot and was referred by his primary physician to the wound care center for debridement.  Care center evaluated foot, felt that there was gangrene involved, so referred to ER for surgical evaluation.    Review of patient's allergies indicates:  No Known Allergies    Past Medical History:  1. Diabete Mellitus, Type II(Insulin Lumus 12.5cc every AM)  2. Hypercholesterolemia  3. Neuro palsy of the left arm 5 years ago(Numbness)      Past Surgical History  1. Foot Amputation through Ankle (Left) secondary to a diabetic coma he lost the leg about a year ago()  2. No EGD or colonoscopy  3. No stress test echo or angiogram       Family history  1. Mother had diabetes  of old age at the age of 68( in her sleep)  2. Father  in his 70s of old age etiology unclear  3. Sister  in her sleep also, cause is unknown to patient    Immunizations  1. Tetanus shot within 5 years he had a spider bite  2. Flu shot this year()  3. pneumonia shot()  4. COVID shot x2 (, )  5. Hepatitis shot  6. shingles shot()    Social History:  1. Does not smoke since 2022  2. Quit drinking about 2 years ago after his below-knee amputation used to drink 6 pack a week for about 35 years he was an alcoholic in the  for about 6 or 7 years  3. No drug abuse history  4. No halfway parole or probation  5. Was in the  lens carpal in the [facility] from  and  he was medical discharge secondary to alcohol abuse  6. 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  He is unmarried has 3 children with 2 different partners but has contact with his children he has a 22-year-old son is a  has a CDL license he has another son that works in home remodeling in his 3rd sound that works at the [employer]   7. Patient lives in [place]  8. Primary care provider is Dr. Mayers out of [place]      Review of  "Systems:  Positive for fatigue and fever  All other systems reviewed and are negative.     Objectives  Vital Signs       05/16/2023    1445  BP is 150/81  Pulse is 106  Respirations is 18   Temp is 100.9 F (38.3 C)   SpO2 is 98%  Weight is 90.6 kg (199 lb 11.8 oz)  Body Mass Index is 28.636 kg/m2  Height is 5' 10"(177.8 cm)    Physical Exam  Patient is cooperative. Non-toxic appearance. He does not look ill.  Very foul odor coming from foot wound(Rt Foot)   Head is atraumatic.  Patient does wear glasses he is 5 ft 10 in 180 lb  Conjunctivae and lids are normal  Trachea normal and Neck supple  Heart rate is normal and rhythm is regular.  Lung sounds are normal.   Abdomen is soft and no complaints of abdominal tenderness.   Neck is supple and left foot BKA noted.   Right foot very edematous multiple ulcerations oozing foul discharge and with an extremely foul odor is patchy poor if any blood flow.  Patient is alert and oriented to person, place, and time.  He has normal strength. He displays a negative Romberg sign.    Skin is warm, dry and intact.   Patient has a normal mood affect. Speech is normal and behavior is normal. He is not actively hallucinating.  He is attentive.     Laboratory Findings:  CBC   05/16/2023   0329  12.22 >   8.6 /28.5  <595  81.3/  21.3    CMP    05/16/2023  0329  137/3.7      103 / 30   18.0 /0.84   < 187    8.0 /       Xray Chest AP Portable:  Findings:  The heart is moderately enlarged with vascular congestion and mold to moderate changes of interstitial edema which are exaggerated by the less than optimal inspiratory effort.  I see no lobar segmental infiltrates. No significant pleural effusions are noted. Mild degenerative changes are noted involving the thoracic spine.       Assessment:  Patient is a 57-year-old  male with a history of type 2 diabetes peripheral artery disease and left below-knee amputation about a year ago after going into a diabetic coma.  Patient since " that time has developed a right foot infection secondary to sustaining a burn to the right foot on the plantar surface.  He has obvious foul-smelling drainage and x-ray studies that show evidence of osteomyelitis of the 4th and 5th proximal metatarsal and talus areas.  He will need excisional debridement of these areas and incision drainage of what appears to be abscess along the plantar surface of the foot and the plantar fascia.  Patient's white count 12 hemoglobin is 8 hematocrit 28 platelet count is 595.  Alkaline phosphatase is elevated at 269 fractionated alkaline phosphatase might be helpful in determining its elevation I suspect secondary to bone sed rates over 95 bacterial cultures include Enterobacter and Proteus from the blood times for cultures positive within 24 hours patient's pH is 7.492 with a pCO2 of 38 PO2 of 52 and a bicarb of 29 ultrasound of the lower extremities shows a 3 cm lymph node in the groin with 50-70% stenotic plaquing within the SFA no ankle-brachial indices was performed.  X-rays are consistent with 4th and 5th proximal metatarsal osteomyelitis     Plan  1. IV fluids  2. IV antibiotics   3. Medical stabilization  4. Excisional debridement of the skin to bone with culturing of the wound culturing of the bone pathologic histo path as well will be obtained.  5. I am going to discuss with the patient possible amputation of the leg in the form of below-knee amputation or at minimum a Syme's amputation once he is healed a little further with IV antibiotic therapy.  6. Patient will have angiographic evaluation workup will include ankle-brachial indices and CT angio of the leg which has already been done cardiology clearance preop will be obtained.  6. Endoscopic evaluation to evaluate his anemia might be reasonable he probably has chronic anemia secondary to chronic disease but nothing further workup would be appropriate    7. I will obtain a TSH and A1c testosterone levels are probably  also somewhat low.

## 2023-05-17 NOTE — OP NOTE
[facility]-Med/Surg  Operative Note      Date of Procedure: 5/17/2023     Procedure: Procedure(s) (LRB):  DEBRIDEMENT, WOUND (Right) right plantar surface necrotic wound requiring excisional debridement skin to bone with debridement of the proximal 5th and 4th metatarsal head as well as the talus bone.    Surgeon(s) and Role:     * Max Duckworth MD - Primary    Assisting Surgeon: None    Pre-Operative Diagnosis: Ulcer of right heel, with fat layer exposed [L97.412]    Post-Operative Diagnosis: Post-Op Diagnosis Codes:     * Ulcer of right heel, with fat layer exposed [L97.412]    Anesthesia: Choice    Operative Findings (including complications, if any):  Patient is a 57-year-old  male with a history of type 2 diabetes poorly compliant has peripheral vascular disease in the left below-knee amputation about a year and a half ago patient came in with purulent drainage from his right foot and was noted to have osteomyelitis of the 4th and 5th proximal metatarsal and talus bone noted on x-ray.  Patient in addition to this had Proteus mirabilis growing in 4 blood cultures that were positive.  Patient underwent culturing of the wound aerobic anaerobic Gram stain with gray brown purulent pus emanating from the wound along the 4th and 5th metatarsal.  There was an open area over the talus bone that was requiring debridement as well.  Bone cultures were sent histo path for the bone.  Patient had bone biopsy sent as well.  Patient had debridement of the 4th and 5th metatarsal heads with a biting rongeur and a cutting rongeur I tried to use a rim be saw but it was nonfunctional.  I did debride the 5 toenails with a cutting rongeur.  The wound was left open TXA was given after a blood transfusion was given patient lost about a unit of blood in doing his debridement.      CT scan as well as ultrasound with ABIs, CT angio have been ordered        Patient will probably need amputation of the right  foot wet-to-dry saline dressings were applied definitely patient will need LTAC placement.  Description of Technical Procedures:  Noted above       Significant Surgical Tasks Conducted by the Assistant(s), if Applicable:  None       Estimated Blood Loss (EBL): 350 mL           Implants: * No implants in log *    Specimens:   Specimen (24h ago, onward)      None                    Condition: Good    Disposition: PACU - hemodynamically stable.    Attestation: I was present and scrubbed for the entire procedure.    Discharge Note    OUTCOME: Patient tolerated treatment/procedure well without complication and is now ready for discharge.    DISPOSITION: Home or Self Care    FINAL DIAGNOSIS:  Gangrene right foot with osteomyelitis of the 4th and 5th proximal metatarsals as well as the talus bone.    FOLLOWUP: In clinic 1 week    DISCHARGE INSTRUCTIONS:  No discharge procedures on file.

## 2023-05-17 NOTE — PROGRESS NOTES
Hospital Medicine  Progress Note    Patient Name: Honorio Robb  MRN: [id number]  Status: IP- Inpatient   Admission Date: 5/16/2023  Length of Stay: 1  Date of Service: 05/17/2023       CC: hospital follow-up for        SUBJECTIVE     Pt is a 57 y.o. male with hx of DM, PAD hx of Left BKA, now with R foot infection that has been chronic and managed as outpatient after sustaining a burn to his R foot.  He went to wound care today and noted purulent drainage from the foot and was sent for surgical evaluation.  Patient reports issues with swelling and purulent drainage for a while now.  Once in ER spiked a fever and noted to have Tachycardia.  He has some NV as well with concern over aspiration.  He is now on 2LNC and resting comfortably.  HR initially went up to 140-150 but now down in 120s.      05/17/2023 no c/o   Pain well controlled   Npo for debridement with dr walton      Today: Patient seen and examined at bedside, and chart reviewed.       MEDICATIONS   Scheduled   collagenase   Topical (Top) Daily    mupirocin   Nasal BID    piperacillin-tazobactam (ZOSYN) IVPB  4.5 g Intravenous Q8H    vancomycin (VANCOCIN) IVPB  1,750 mg Intravenous Q12H    vancomycin (VANCOCIN) IVPB  2,000 mg Intravenous Once     Continuous Infusions        PHYSICAL EXAM   VITALS: T 97.8 °F (36.6 °C)   /71   P 100   RR 16   O2 100 %        General - Resting comfortably in bed. No apparent distress.  HEENT - PERRLA. Extraocular movements intact. No scleral icterus.   Neck -  The JVP is not elevated.   CV - Tachycardic   Resp - Good inspiratory effort. The lungs are clear to auscultation no audible wheeze  GI - Soft. Non-tender to palpation.   Extrem -  Left BKA, R foot swollen bandaged and draining, pictures in chart.   Neuro - CN II through XII are grossly intact. Moves all ext well   PSYC - Alert and oriented times four. Normal affect   SKIN - see above      LABS   CBC  Recent Labs     05/16/23  1539 05/17/23  0423   WBC 12.22* 16.83*    RBC 3.54* 3.00*   HGB 8.6* 7.3*   HCT 28.5* 24.4*   MCV 80.5 81.3   MCH 24.3* 24.3*   MCHC 30.2* 29.9*   RDW 22.8 22.8   * 499*     CHEM  Recent Labs     05/16/23  1539 05/17/23  0423    137   K 3.9 3.7   CHLORIDE 102 103   CO2 31 30   BUN 18.0 18.0   CREATININE 0.73 0.84   GLUCOSE 126* 187*   CALCIUM 9.1 8.0*   ALBUMIN 3.1* 2.3*   GLOBULIN 4.3* 3.4   ALKPHOS 269* 239*   ALT 30 27   AST 30 34   BILITOT 0.6 0.5   CRP 17.9* 17.5*   TSH  --  2.050         MICROBIOLOGY     Microbiology Results (last 7 days)       Procedure Component Value Units Date/Time    Wound Culture [[id number]] Collected: 05/17/23 1013    Order Status: Sent Specimen: Wound from Leg, Right Updated: 05/17/23 1115    Blood Culture [[id number]]  (Abnormal) Collected: 05/16/23 1539    Order Status: Completed Specimen: Blood from Arm, Right Updated: 05/17/23 0836     GRAM STAIN 2 of 2 bottles positive      Gram negative bacilli      Seen in gram stain of broth only    Blood Culture [[id number]]  (Abnormal) Collected: 05/16/23 1539    Order Status: Resulted Specimen: Blood from Antecubital, Left Updated: 05/17/23 0835     GRAM STAIN 2 of 2 bottles positive      Seen in gram stain of broth only      Gram negative bacilli    Narrative:      Called to [facility]/ES [id number] will notify md [id number] Panel [[id number]]  (Abnormal) Collected: 05/16/23 1539    Order Status: Completed Specimen: Blood from Antecubital, Left Updated: 05/17/23 0832     CTX-M (ESBL ) Not Detected     IMP (Cabapenemase ) Not Detected     KPC resistance gene (Carbapenemase ) Not Detected     mcr-1 N/A     mecA ID N/A     mecA/C and MREJ (MRSA) gene N/A     NDM (Carbapenemase ) Not Detected     OXA-48-like (Carbapenemase ) Not Detected     Lokesh/B (VRE gene) N/A     VIM (Carbapenemase ) Not Detected     Enterococcus faecalis Not Detected     Enterococcus faecium Not Detected     Listeria monocytogenes Not Detected      Staphylococcus spp. Not Detected     Staphylococcus aureus Not Detected     Staphylococcus epidermidis Not Detected     Staphylococcus lugdunensis Not Detected     Streptococcus spp. Not Detected     Streptococcus agalactiae (Group B) Not Detected     Streptococcus pneumoniae Not Detected     Streptococcus pyogenes (Group A) Not Detected     Acinetobacter calcoaceticus/baumannii complex Not Detected     Bacteroides fragilis Not Detected     Enterobacterales Detected     Enterobacter cloacae complex Not Detected     Escherichia coli Not Detected     Klebsiella aerogenes Not Detected     Klebsiella oxytoca Not Detected     Klebsiella pneumoniae group Not Detected     Proteus spp. Detected     Salmonella spp. Not Detected     Serratia marcescens Not Detected     Haemophilus influenzae Not Detected     Neisseria meningitidis Not Detected     Pseudomonas aeruginosa Not Detected     Stenotrophomonas maltophilia Not Detected     Candida albicans Not Detected     Candida auris Not Detected     Candida glabrata Not Detected     Candida krusei Not Detected     Candida parapsilosis Not Detected     Candida tropicalis Not Detected     Cryptococcus neoformans/gattii Not Detected    Narrative:      The Biopharmac[employer]ae BCID2 Panel is a multiplexed nucleic acid test intended for the use with Zipalong® 2.0 or Zipalong® TrustHop Systems for the simultaneous qualitative detection and identification of multiple bacterial and yeast nucleic acids and select genetic determinants associated with antimicrobial resistance.  The [employer] BCID2 Panel test is performed directly on blood culture samples identified as positive by a continuous monitoring blood culture system.  Results are intended to be interpreted in conjunction with Gram stain results.              DIAGNOSTICS   US Lower Extrem Arteries Bilat with JAMAL (xpd)  Narrative: EXAMINATION:  STUDY: US ARTERIAL LOWER EXTREMITY BILAT WITH JAMAL (XPD)    CLINICAL HISTORY AND  TECHNIQUE:  Radha Oliver, RT on 5/16/2023  7:36 PM    ER    CLINICAL HX:CHRONIC RT FOOT INFECTION    PMH:DIABETIC, LEFT BKA    TECH:2D VASCULAR ARTERIAL US OF BILAT LOWER EXT WITH COLOR FLOW AND DOPPLER    US TECH:PLP    COMPARISON:  None    FINDINGS:  Right lower extremity:    External iliac artery: Peak systolic flow velocity is 212 cm/s with multiphasic waveforms.    Common femoral artery: Peak systolic flow velocity is 209 cm/s with multiphasic waveforms.    Deep femoral artery: Peak systolic flow velocity is 140 cm/s with multiphasic waveforms.    Superficial femoral artery (proximal): Peak systolic flow velocity is 158 cm/s with multiphasic waveforms.    Superficial femoral artery (middle): Peak systolic flow velocity is 133 cm/s with multiphasic waveforms.    Superficial femoral artery (distal): Peak systolic flow velocity is 114 cm/s with multiphasic waveforms.    Popliteal artery: Peak systolic flow velocity is 151 cm/s with monophasic, continuous flow waveforms with an end-diastolic flow velocity of 27 cm per 2nd.    Peroneal artery: Peak systolic flow velocity is 212 cm/s with monophasic, continuous flow waveforms with an end-diastolic flow velocity of 52 centimeters/second.    Posterior tibial artery: Peak systolic flow velocity is 102 cm/s with monophasic, continuous flow waveforms with an end-diastolic flow velocity of 12 centimeters/second.    Anterior tibial artery: Peak systolic flow velocity is 101 cm/s with monophasic, continuous flow waveforms with an end-diastolic flow velocity of 30 centimeters/second.    Dorsalis pedis artery: Peak systolic flow velocity is 46 cm/s with monophasic, continuous flow waveforms with an end-diastolic flow velocity of 15 centimeters/second.    Incidentally noted is a 3 cm lymph node with a fatty hilum and no worrisome perfusion on color-flow mapping within the right inguinal region.    Left lower extremity:    External iliac artery: Peak systolic flow velocity is  167 cm/s with multiphasic waveforms.    Common femoral artery: Peak systolic flow velocity is 167 cm/s with multiphasic waveforms.    Deep femoral artery: Peak systolic flow velocity is 107 cm/s with multiphasic waveforms.    Superficial femoral artery (proximal): Peak systolic flow velocity is 120 cm/s with multiphasic waveforms.    Superficial femoral artery (middle): Peak systolic flow velocity is 119 cm/s with multiphasic waveforms.    Superficial femoral artery (distal): Peak systolic flow velocity is 90 cm/s with multiphasic waveforms.    Popliteal artery: Peak systolic flow velocity is 59 cm/s with multiphasic waveforms.    Peroneal artery: Peak systolic flow velocity is 98 cm/s with multiphasic waveforms.    The patient is post left-sided BKA.  Impression: 1. The patient is post left-sided BKA.  2. Abnormal waveforms are noted within the arterial vasculature of the right lower extremity at and distal to popliteal artery suggesting a high probability of diffuse atherosclerotic plaquing with numerous short segmental stenotic segments of at least 50-70%.  3. A 3 cm lymph node is noted within the right inguinal region.    Electronically signed by: Rodrigo Silveira  Date:    05/17/2023  Time:    04:23        ASSESSMENT/PLAN:   Problem: R foot gangrene/possible Necrotizing fascitis   Differential Dx: Gangrene vs Nec fas vs both   Chronic issues affecting care: DM, PAD  Radiology reports reviewed and/or personal interpretation: CT report as above  Tests considered or ordered: Repeat imaging, Art doppler   Plan of care: Admit to ICU for closer monitoring, IV ABX, surgical evaluation for likely amputation.  No sure if foot is salvageable at this point.    Keep NPO     Problem: Sepsis  Chronic issues affecting care: DM  Radiology reports reviewed and/or personal interpretation: as above   Tests considered or ordered: cultures  Plan of care: IV ABX, fluids, close monitoring         Problem: PAD  Chronic issues affecting  care: DM  Radiology reports reviewed and/or personal interpretation: CT report as above   Tests considered or ordered: US ART  Plan of care: May need vascular intervention if ischemia affecting wound healing, cardiology aware.      Problem: Leukocytosis  Differential DX: likely infectious source vs other   Chronic issues affecting care: DM, PAD  Radiology reports reviewed and/or personal interpretation: as above   Tests considered or ordered: cultures  Plan of care: IV ABX, surgical evaluation     Problem: Tachycardia  Differential DX: SVT vs sinus tach   Chronic issues affecting care:  Radiology reports reviewed and/or personal interpretation: Tele noted   Tests considered or ordered: EKG, Echo  Plan of care: Fluids, monitor.      Problem: Nausea and vomiting   Differential DX: viral gastroenteritis vs gastroparesis   Chronic issues affecting care: DM  Radiology reports reviewed and/or personal interpretation: CXR neg for asp infiltrate  Tests considered or ordered: imaging   Plan of care: treat with meds prn, if concern over aspiration he is on ABX to cover as well     Problem: Hypoxia  Differential DX: CHF vs asp pna  Chronic issues affecting care: DM  Radiology reports reviewed and/or personal interpretation: CXR as above   Tests considered or ordered: Echo  Plan of care: ABX and monitor and wean when possible.  May need additional workup.                  Chronic medical issues addressed during this admission and plan of care:  DMII - SSI and monitor  PAD - US ART to assess blood flow          Prophylaxis: no lovenox         Denis Mosqueda MD  [facility]

## 2023-05-17 NOTE — ADDENDUM NOTE
Encounter addended by: Eloina Mathias RN on: 5/17/2023 1:08 PM   Actions taken: Allergies modified, Allergies reviewed

## 2023-05-17 NOTE — PLAN OF CARE
05/17/23 1042   Discharge Assessment   Assessment Type Discharge Planning Assessment   Confirmed/corrected address, phone number and insurance Yes   Confirmed Demographics Correct on Facesheet   Source of Information patient   When was your last doctors appointment? 05/16/23   Reason For Admission Gangrenous Wound   People in Home child(nella), adult   Do you expect to return to your current living situation? Yes   Prior to hospitilization cognitive status: Alert/Oriented   Current cognitive status: Alert/Oriented   Walking or Climbing Stairs ambulation difficulty, requires equipment   Mobility Management Wheelchair   Equipment Currently Used at Home wheelchair   Readmission within 30 days? Yes  ([facility] last week for 3-4 days)   Do you take prescription medications? Yes   Do you have prescription coverage? Yes   Coverage Healthy Blue   Do you have any problems affording any of your prescribed medications? No   Is the patient taking medications as prescribed? yes   Who is going to help you get home at discharge? Son   How do you get to doctors appointments? car, drives self   Are you on dialysis? No   Do you take coumadin? No   Discharge Plan A Long-term acute care facility (LTAC)   Discharge Plan B Rehab   DME Needed Upon Discharge  none   Discharge Plan discussed with: Patient   Transition of Care Barriers None   Physical Activity   On average, how many days per week do you engage in moderate to strenuous exercise (like a brisk walk)? 0 days   On average, how many minutes do you engage in exercise at this level? 0 min   Financial Resource Strain   How hard is it for you to pay for the very basics like food, housing, medical care, and heating? Not hard   Housing Stability   In the last 12 months, was there a time when you were not able to pay the mortgage or rent on time? N   In the last 12 months, how many places have you lived? 2   In the last 12 months, was there a time when you did not have a steady place to  sleep or slept in a shelter (including now)? N   Transportation Needs   In the past 12 months, has lack of transportation kept you from medical appointments or from getting medications? no   In the past 12 months, has lack of transportation kept you from meetings, work, or from getting things needed for daily living? No   Food Insecurity   Within the past 12 months, you worried that your food would run out before you got the money to buy more. Never true   Within the past 12 months, the food you bought just didn't last and you didn't have money to get more. Never true   Stress   Do you feel stress - tense, restless, nervous, or anxious, or unable to sleep at night because your mind is troubled all the time - these days? Not at all   Social Connections   In a typical week, how many times do you talk on the phone with family, friends, or neighbors? More than 3   How often do you get together with friends or relatives? More than 3   How often do you attend Jewish or Rastafari services? More than 4   Do you belong to any clubs or organizations such as Jewish groups, unions, fraternal or athletic groups, or school groups? No   How often do you attend meetings of the clubs or organizations you belong to? Never   Are you , , , , never , or living with a partner? Never marrie   Alcohol Use   Q1: How often do you have a drink containing alcohol? Never   Q2: How many drinks containing alcohol do you have on a typical day when you are drinking? None   Q3: How often do you have six or more drinks on one occasion? Never   OTHER   Name(s) of People in Home Mando Robb-Son

## 2023-05-17 NOTE — NURSING
1246-ATTEMPTED TO CALL REPORT TO [facility]. CHARGE NURSE BETZAIDA STATES THAT LILLIE KO IS UNAVAILABLE AT THIS TIME AND WILL CALL BACK.    1303-CALL FROM KELSEY AT [facility]. STATES THAT DURING THEIR TREATMENT OF THE PATIENT HE DENIED ANY ALLERGIES, HOWEVER THEY RECEIVED RECORDS FROM [facility] INDICATING THAT THE PATIENT MAY HAVE ALLERGIES TO VANCOMYCIN, DILAUDID, AND CONTRAST DYE. KELSEY STATES THAT SHE IS UNAWARE HOW ACCURATE THIS IS BUT WANTED TO PASS IT ON FOR THE PATIENT'S SAFETY. CALL PLACED TO DR. GUTIERREZ TO INFORM HIM AS PATIENT HAS RECEIVED A DOSE OF VANCOMYCIN. NO ANSWER AT THIS TIME.    1314-REPORT GIVEN TO LILLIE KO.    1327-DR. GUTIERREZ PRESENT. DISCUSSED THE CALL FROM KELSEY WITH [facility], HE STATES THAT THE PATIENT IS SHOWING NO SIGNS OF AN ALLERGIC REACTION TO THE DOSE GIVEN SO WE WILL PROCEED WITH THE MEDICATION AT THIS TIME.

## 2023-05-17 NOTE — PLAN OF CARE
Problem: Adult Inpatient Plan of Care  Goal: Plan of Care Review  Outcome: Ongoing, Progressing  Goal: Patient-Specific Goal (Individualized)  Outcome: Ongoing, Progressing  Goal: Absence of Hospital-Acquired Illness or Injury  Outcome: Ongoing, Progressing  Goal: Optimal Comfort and Wellbeing  Outcome: Ongoing, Progressing  Goal: Readiness for Transition of Care  Outcome: Ongoing, Progressing     Problem: Impaired Wound Healing  Goal: Optimal Wound Healing  Outcome: Ongoing, Progressing     Problem: Fall Injury Risk  Goal: Absence of Fall and Fall-Related Injury  Outcome: Ongoing, Progressing     Problem: Pain Acute  Goal: Acceptable Pain Control and Functional Ability  Outcome: Ongoing, Progressing     Problem: Infection  Goal: Absence of Infection Signs and Symptoms  Outcome: Ongoing, Progressing     Problem: Diabetes Comorbidity  Goal: Blood Glucose Level Within Targeted Range  Outcome: Ongoing, Progressing     Problem: Skin Injury Risk Increased  Goal: Skin Health and Integrity  Outcome: Ongoing, Progressing

## 2023-05-17 NOTE — ANESTHESIA PREPROCEDURE EVALUATION
05/17/2023  Honorio Robb is a 57 y.o., male.      Pre-op Assessment    I have reviewed the Patient Summary Reports.     I have reviewed the Nursing Notes. I have reviewed the NPO Status.   I have reviewed the Medications.     Review of Systems  Anesthesia Hx:  No problems with previous Anesthesia  Denies Family Hx of Anesthesia complications.   Denies Personal Hx of Anesthesia complications.   Hematology/Oncology:     Oncology Normal    -- Anemia:   EENT/Dental:EENT/Dental Normal   Cardiovascular:  Cardiovascular Normal Exercise tolerance: good     Pulmonary:  Pulmonary Normal    Renal/:  Renal/ Normal     Hepatic/GI:  Hepatic/GI Normal    Musculoskeletal:  Musculoskeletal Normal    Neurological:  Neurology Normal    Endocrine:   Diabetes, type 2    Dermatological:  Skin Normal    Psych:  Psychiatric Normal           Physical Exam  General: Well nourished, Cooperative, Alert and Oriented    Airway:  Mallampati: II / II  Mouth Opening: Normal  TM Distance: Normal  Tongue: Normal  Neck ROM: Normal ROM    Dental:  Intact        Anesthesia Plan  Type of Anesthesia, risks & benefits discussed:    Anesthesia Type: MAC, Gen ETT  Intra-op Monitoring Plan: Standard ASA Monitors  Post Op Pain Control Plan: multimodal analgesia  Induction:  IV  Airway Plan: Direct  Informed Consent: Informed consent signed with the Patient and all parties understand the risks and agree with anesthesia plan.  All questions answered. Patient consented to blood products? Yes  ASA Score: 3  Day of Surgery Review of History & Physical: H&P Update referred to the surgeon/provider.I have interviewed and examined the patient. I have reviewed the patient's H&P dated: There are no significant changes.     Ready For Surgery From Anesthesia Perspective.     .

## 2023-05-17 NOTE — ANESTHESIA POSTPROCEDURE EVALUATION
Anesthesia Post Evaluation    Patient: Honorio Robb    Procedure(s) Performed: Procedure(s) (LRB):  DEBRIDEMENT, WOUND (Right)    Final Anesthesia Type: MAC      Patient location during evaluation: OPS  Patient participation: Yes- Able to Participate  Level of consciousness: awake and alert, awake and oriented  Post-procedure vital signs: reviewed and stable  Pain management: adequate  Airway patency: patent    PONV status at discharge: No PONV  Anesthetic complications: no      Cardiovascular status: blood pressure returned to baseline  Respiratory status: unassisted, room air and spontaneous ventilation  Hydration status: euvolemic  Follow-up not needed.          Vitals Value Taken Time   /76 05/17/23 1352   Temp 37.3 °C (99.1 °F) 05/17/23 1352   Pulse 97 05/17/23 1352   Resp 20 05/17/23 1352   SpO2 95 % 05/17/23 1352         No case tracking events are documented in the log.      Pain/Julio Score: Pain Rating Prior to Med Admin: 0 (5/16/2023  4:15 PM)

## 2023-05-17 NOTE — NURSING TRANSFER
Nursing Transfer Note      5/17/2023     Reason patient is being transferred: PHYSICIAN ORDERED    Transfer To: 205    Transfer via bed    Transfer with cardiac monitoring    Transported by [facility]    Telemetry: Box Number [id number]    Medicines sent: NONE    Any special needs or follow-up needed: N/A    Chart send with patient: Yes    Notified: N/A    Patient reassessed at: 5/17/23, 1345 (date, time)  1  Upon arrival to floor: cardiac monitor applied, patient oriented to room, call bell in reach, and bed in lowest position

## 2023-05-17 NOTE — H&P (VIEW-ONLY)
Patient referred to the emergency room by wound care.  He had a   burn on his right foot and was referred by his primary physician to the wound care center for debridement.  Care center evaluated foot, felt that there was gangrene involved, so referred to ER for surgical evaluation.    Review of patient's allergies indicates:  No Known Allergies    Past Medical History:  1. Diabete Mellitus, Type II  2. Hypercholesterolemia  3. Neuro palsy of the left arm 5 years ago      Past Surgical History  1. Foot Amputation through Ankle (Left) secondary to a diabetic coma he lost the leg about a year ago  2. No EGD or colonoscopy  3. No stress test echo or angiogram       Family history  1. Mother had diabetes  of old age at the age of 68   2. Father  in his 70s of old age etiology unclear    Immunizations  1. Tetanus shot within 5 years he had a spider bite  2. Flu shot this year   3. No pneumonia shot  4. COVID shot x2   5. Hepatitis shot x2   6. No shingles shot    Social History:  1. Does not smoke  2. Quit drinking about 2 years ago after his below-knee amputation used to drink 6 pack a week for about 35 years he was an alcoholic in the  for about 6 or 7 years  3. No drug abuse history  4. No shelter parole or probation  5. Was in the  lens carpal in the [facility] from  and  he was medical discharge secondary to alcohol abuse  6. 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  He is unmarried has 3 children with 2 different partners but has contact with his children he has a 22-year-old son is a  has a CDL license he has another son that works in home remodeling in his 3rd sound that works at the [employer]   7. Patient lives in [place]   8. Primary care provider is Dr. Mayers out of [place]      Review of Systems:  Positive for fatigue and fever  All other systems reviewed and are negative.     Objectives  Vital Signs       2023    1445  BP is 150/81  Pulse is  "106  Respirations is 18   Temp is 100.9 F (38.3 C)   SpO2 is 98%  Weight is 90.6 kg (199 lb 11.8 oz)  Body Mass Index is 28.636 kg/m2  Height is 5' 10"(177.8 cm)    Physical Exam  Patient is cooperative. Non-toxic appearance. He does not look ill.  Very foul odor coming from foot wound(Rt Foot)   Head is atraumatic.  Patient does wear glasses he is 5 ft 10 in 180 lb  Conjunctivae and lids are normal  Trachea normal and Neck supple  Heart rate is normal and rhythm is regular.  Lung sounds are normal.   Abdomen is soft and no complaints of abdominal tenderness.   Neck is supple and left foot BKA noted.   Right foot very edematous multiple ulcerations oozing foul discharge and with an extremely foul odor is patchy poor if any blood flow.  Patient is alert and oriented to person, place, and time.  He has normal strength. He displays a negative Romberg sign.    Skin is warm, dry and intact.   Patient has a normal mood affect. Speech is normal and behavior is normal. He is not actively hallucinating.  He is attentive.     Laboratory Findings:  CBC   05/16/2023   0329  12.22 >   8.6 /28.5  <595  81.3/  21.3    CMP    05/16/2023  0329  137/3.7      103 / 30   18.0 /0.84   < 187    8.0 /       Xray Chest AP Portable:  Findings:  The heart is moderately enlarged with vascular congestion and mold to moderate changes of interstitial edema which are exaggerated by the less than optimal inspiratory effort.  I see no lobar segmental infiltrates. No significant pleural effusions are noted. Mild degenerative changes are noted involving the thoracic spine.       Assessment:  Patient is a 57-year-old  male with a history of type 2 diabetes peripheral artery disease and left below-knee amputation about a year ago after going into a diabetic coma.  Patient since that time has developed a right foot infection secondary to sustaining a burn to the right foot on the plantar surface.  He has obvious foul-smelling drainage and " x-ray studies that show evidence of osteomyelitis of the 4th and 5th proximal metatarsal and talus areas.  He will need excisional debridement of these areas and incision drainage of what appears to be abscess along the plantar surface of the foot and the plantar fascia.  Patient's white count 12 hemoglobin is 8 hematocrit 28 platelet count is 595.  Alkaline phosphatase is elevated at 269 fractionated alkaline phosphatase might be helpful in determining its elevation I suspect secondary to bone sed rates over 95 bacterial cultures include Enterobacter and Proteus from the blood times for cultures positive within 24 hours patient's pH is 7.492 with a pCO2 of 38 PO2 of 52 and a bicarb of 29 ultrasound of the lower extremities shows a 3 cm lymph node in the groin with 50-70% stenotic plaquing within the SFA no ankle-brachial indices was performed.  X-rays are consistent with 4th and 5th proximal metatarsal osteomyelitis     Plan  1. IV fluids  2. IV antibiotics   3. Medical stabilization  4. Excisional debridement of the skin to bone with culturing of the wound culturing of the bone pathologic histo path as well will be obtained.  5. I am going to discuss with the patient possible amputation of the leg in the form of below-knee amputation or at minimum a Syme's amputation once he is healed a little further with IV antibiotic therapy.  6. Patient will have angiographic evaluation workup will include ankle-brachial indices and CT angio of the leg which has already been done cardiology clearance preop will be obtained.  6. Endoscopic evaluation to evaluate his anemia might be reasonable he probably has chronic anemia secondary to chronic disease but nothing further workup would be appropriate    7. I will obtain a TSH and A1c testosterone levels are probably also somewhat low.

## 2023-05-17 NOTE — PROGRESS NOTES
[facility]    Patient Name:  Honorio Robb   MRN:  [id number]  Date: 5/17/2023  Diagnosis: <principal problem not specified>    History:     Past Medical History:   Diagnosis Date    Diabetes mellitus, type 2        Social History     Socioeconomic History    Marital status: Unknown   Tobacco Use    Smoking status: Never    Smokeless tobacco: Never   Substance and Sexual Activity    Alcohol use: Never    Drug use: Never     Social Determinants of Health     Financial Resource Strain: Low Risk     Difficulty of Paying Living Expenses: Not hard at all   Food Insecurity: No Food Insecurity    Worried About Running Out of Food in the Last Year: Never true    Ran Out of Food in the Last Year: Never true   Transportation Needs: No Transportation Needs    Lack of Transportation (Medical): No    Lack of Transportation (Non-Medical): No   Physical Activity: Inactive    Days of Exercise per Week: 0 days    Minutes of Exercise per Session: 0 min   Stress: No Stress Concern Present    Feeling of Stress : Not at all   Social Connections: Moderately Isolated    Frequency of Communication with Friends and Family: More than three times a week    Frequency of Social Gatherings with Friends and Family: More than three times a week    Attends Nondenominational Services: More than 4 times per year    Active Member of Clubs or Organizations: No    Attends Club or Organization Meetings: Never    Marital Status: Never    Housing Stability: Low Risk     Unable to Pay for Housing in the Last Year: No    Number of Places Lived in the Last Year: 2    Unstable Housing in the Last Year: No       Precautions:     Allergies as of 05/16/2023    (No Known Allergies)       WO Assessment Details/Treatment            05/17/23 0949   WOCN Assessment   WOCN Total Time (mins) 45   Visit Date 05/17/23   Visit Time 0900   Consult Type New   WOCN Speciality Wound   Number of Wounds 3   Intervention assessed;changed;applied    Teaching on-going        Altered Skin Integrity 05/16/23 2300 Left anterior;lower Leg Full thickness tissue loss. Base is covered by slough and/or eschar in the wound bed   Date First Assessed/Time First Assessed: 05/16/23 2300   Altered Skin Integrity Present on Admission - Did Patient arrive to the hospital with altered skin?: yes  Side: Left  Orientation: anterior;lower  Location: Leg  Description of Altered Skin Inte...   Wound Image     Description of Altered Skin Integrity Full thickness tissue loss. Base is covered by slough and/or eschar in the wound bed   Dressing Appearance Intact;Dry;Clean   Drainage Amount Scant   Drainage Characteristics/Odor Creamy;Yellow   Appearance Slough;Moist   Yellow (%), Wound Tissue Color 100 %   Periwound Area Intact   Wound Edges Defined   Wound Length (cm) 2.4 cm   Wound Width (cm) 2 cm   Wound Depth (cm) 0.1 cm   Wound Volume (cm^3) 0.48 cm^3   Wound Surface Area (cm^2) 4.8 cm^2   Care Cleansed with:  (vashe wound cleanser then applied santyl)   Dressing Applied;Gauze  (and tape)   Dressing Change Due 05/18/23        Altered Skin Integrity 05/16/23 1335 Right lateral Foot Non pressure chronic ulcer   Date First Assessed/Time First Assessed: 05/16/23 1335   Altered Skin Integrity Present on Admission - Did Patient arrive to the hospital with altered skin?: yes  Side: Right  Orientation: lateral  Location: Foot  Primary Wound Type: Non pressure ...   Dressing Appearance Saturated   Drainage Amount Copious   Drainage Characteristics/Odor Malodorous;Serosanguineous   Appearance Red;Moist   Tissue loss description Full thickness   Red (%), Wound Tissue Color 100 %   Periwound Area Redness;Edematous   Wound Edges Defined   Care Cleansed with:  (vashe.)   Dressing Applied;Absorptive Pad;Rolled gauze  (Dr. Duckworth at bedside, stated to wrap with dry dressing.  Patient would be brought down to surgery today for debridement.)   Dressing Change Due 05/18/23        Altered  Skin Integrity 05/16/23 1600 Right plantar Foot Non pressure chronic ulcer   Date First Assessed/Time First Assessed: 05/16/23 1600   Side: Right  Orientation: plantar  Location: Foot  Primary Wound Type: Non pressure chronic ulcer   Dressing Appearance Saturated   Drainage Amount Copious   Drainage Characteristics/Odor Purulent;Yellow;Tan;Malodorous   Appearance Gray;White;Tan;Necrotic;Moist;Pink   Tissue loss description Full thickness   Red (%), Wound Tissue Color 10 %   Yellow (%), Wound Tissue Color 90 %   Periwound Area Edematous;Redness;Macerated   Wound Edges Irregular   Care Cleansed with:  (vashe)   Dressing Applied;Absorptive Pad;Rolled gauze  (Dr. Duckworth in room at bedside.  Stated to wrap foot with dry dressing.  patient will be brought down to surgery today for debridement.)   Dressing Change Due 05/18/23 05/17/2023

## 2023-05-17 NOTE — NURSING
Patient arrived to room 205 on Med Surg Unit A at 1345. Blood transfusion initiated at 1352. Surgery arrived to  patient at 1405. Surgery department to complete blood transfusion/ vitals. Belongings left in room including clothing and an air mattress.

## 2023-05-17 NOTE — PLAN OF CARE
Problem: Adult Inpatient Plan of Care  Goal: Plan of Care Review  5/16/2023 2342 by Paulette Pereira RN  Outcome: Ongoing, Progressing  5/16/2023 2341 by Paulette Pereira RN  Outcome: Ongoing, Progressing  Goal: Patient-Specific Goal (Individualized)  5/16/2023 2342 by Paulette Pereira RN  Outcome: Ongoing, Progressing  5/16/2023 2341 by Paulette Pereira RN  Outcome: Ongoing, Progressing  Goal: Absence of Hospital-Acquired Illness or Injury  5/16/2023 2342 by Paulette Pereira RN  Outcome: Ongoing, Progressing  5/16/2023 2341 by Paulette Pereira RN  Outcome: Ongoing, Progressing  Goal: Optimal Comfort and Wellbeing  5/16/2023 2342 by Paulette Pereira RN  Outcome: Ongoing, Progressing  5/16/2023 2341 by Paulette Pereira RN  Outcome: Ongoing, Progressing  Goal: Readiness for Transition of Care  5/16/2023 2342 by Paulette Pereira RN  Outcome: Ongoing, Progressing  5/16/2023 2341 by Paulette Pereira RN  Outcome: Ongoing, Progressing     Problem: Impaired Wound Healing  Goal: Optimal Wound Healing  5/16/2023 2342 by Paulette Pereira RN  Outcome: Ongoing, Progressing  5/16/2023 2341 by Paulette Pereira RN  Outcome: Ongoing, Progressing     Problem: Fall Injury Risk  Goal: Absence of Fall and Fall-Related Injury  Outcome: Ongoing, Progressing     Problem: Pain Acute  Goal: Acceptable Pain Control and Functional Ability  Outcome: Ongoing, Progressing     Problem: Infection  Goal: Absence of Infection Signs and Symptoms  Outcome: Ongoing, Progressing     Problem: Diabetes Comorbidity  Goal: Blood Glucose Level Within Targeted Range  Outcome: Ongoing, Progressing

## 2023-05-18 PROBLEM — M86.9 OSTEOMYELITIS: Status: ACTIVE | Noted: 2023-05-18

## 2023-05-18 LAB
ABO + RH BLD: NORMAL
ANION GAP SERPL CALC-SCNC: 3 MEQ/L (ref 2–13)
ANION GAP SERPL CALC-SCNC: 3 MEQ/L (ref 2–13)
AORTIC VALVE CUSP SEPERATION: 2.21 CM
ASCENDING AORTA: 2.41 CM
AV INDEX (PROSTH): 0.88
AV MEAN GRADIENT: 6 MMHG
AV PEAK GRADIENT: 10 MMHG
AV VALVE AREA: 3.24 CM2
AV VELOCITY RATIO: 0.86
BASOPHILS # BLD AUTO: 0.04 X10(3)/MCL (ref 0.01–0.08)
BASOPHILS NFR BLD AUTO: 0.3 % (ref 0.1–1.2)
BLD PROD TYP BPU: NORMAL
BLOOD UNIT EXPIRATION DATE: NORMAL
BLOOD UNIT TYPE CODE: 8400
BSA FOR ECHO PROCEDURE: 2.01 M2
BUN SERPL-MCNC: 15 MG/DL (ref 7–20)
BUN SERPL-MCNC: 16 MG/DL (ref 7–20)
CALCIUM SERPL-MCNC: 7.4 MG/DL (ref 8.4–10.2)
CALCIUM SERPL-MCNC: 8 MG/DL (ref 8.4–10.2)
CHLORIDE SERPL-SCNC: 102 MMOL/L (ref 98–110)
CHLORIDE SERPL-SCNC: 102 MMOL/L (ref 98–110)
CO2 SERPL-SCNC: 28 MMOL/L (ref 21–32)
CO2 SERPL-SCNC: 31 MMOL/L (ref 21–32)
CREAT SERPL-MCNC: 0.84 MG/DL (ref 0.66–1.25)
CREAT SERPL-MCNC: 1.01 MG/DL (ref 0.66–1.25)
CREAT/UREA NIT SERPL: 15 (ref 12–20)
CREAT/UREA NIT SERPL: 19 (ref 12–20)
CROSSMATCH INTERPRETATION: NORMAL
CV ECHO LV RWT: 0.33 CM
DISPENSE STATUS: NORMAL
DOP CALC AO PEAK VEL: 1.58 M/S
DOP CALC AO VTI: 33.1 CM
DOP CALC LVOT AREA: 3.7 CM2
DOP CALC LVOT DIAMETER: 2.17 CM
DOP CALC LVOT PEAK VEL: 1.36 M/S
DOP CALC LVOT STROKE VOLUME: 107.2 CM3
DOP CALCLVOT PEAK VEL VTI: 29 CM
E WAVE DECELERATION TIME: 160 MSEC
E/A RATIO: 1.23
E/E' RATIO: 10.08 M/S
ECHO LV POSTERIOR WALL: 0.91 CM (ref 0.6–1.1)
EJECTION FRACTION: 46 %
EOSINOPHIL # BLD AUTO: 0.18 X10(3)/MCL (ref 0.04–0.54)
EOSINOPHIL NFR BLD AUTO: 1.6 % (ref 0.7–7)
ERYTHROCYTE [DISTWIDTH] IN BLOOD BY AUTOMATED COUNT: 21.2 %
FRACTIONAL SHORTENING: 27 % (ref 28–44)
GFR SERPLBLD CREATININE-BSD FMLA CKD-EPI: 88 MLS/MIN/1.73/M2
GFR SERPLBLD CREATININE-BSD FMLA CKD-EPI: >90 MLS/MIN/1.73/M2
GLUCOSE SERPL-MCNC: 171 MG/DL (ref 70–115)
GLUCOSE SERPL-MCNC: 179 MG/DL (ref 70–115)
HCT VFR BLD AUTO: 18.9 % (ref 36–52)
HCT VFR BLD AUTO: 25.2 % (ref 36–52)
HGB BLD-MCNC: 5.9 G/DL (ref 13–18)
HGB BLD-MCNC: 8.1 G/DL (ref 13–18)
IMM GRANULOCYTES # BLD AUTO: 0.04 X10(3)/MCL (ref 0–0.03)
IMM GRANULOCYTES NFR BLD AUTO: 0.3 % (ref 0–0.5)
INTERVENTRICULAR SEPTUM: 1.14 CM (ref 0.6–1.1)
IVC DIAMETER: 2.24 CM
LEFT ATRIUM SIZE: 4.01 CM
LEFT ATRIUM VOLUME INDEX MOD: 43.3 ML/M2
LEFT ATRIUM VOLUME MOD: 90 CM3
LEFT INTERNAL DIMENSION IN SYSTOLE: 4.02 CM (ref 2.1–4)
LEFT VENTRICLE DIASTOLIC VOLUME INDEX: 71.15 ML/M2
LEFT VENTRICLE DIASTOLIC VOLUME: 148 ML
LEFT VENTRICLE MASS INDEX: 106 G/M2
LEFT VENTRICLE SYSTOLIC VOLUME INDEX: 34 ML/M2
LEFT VENTRICLE SYSTOLIC VOLUME: 70.8 ML
LEFT VENTRICULAR INTERNAL DIMENSION IN DIASTOLE: 5.51 CM (ref 3.5–6)
LEFT VENTRICULAR MASS: 220.91 G
LV LATERAL E/E' RATIO: 8.64 M/S
LV SEPTAL E/E' RATIO: 12.1 M/S
LVOT MG: 3.6 MMHG
LVOT MV: 0.87 CM/S
LYMPHOCYTES # BLD AUTO: 1.09 X10(3)/MCL (ref 1.32–3.57)
LYMPHOCYTES NFR BLD AUTO: 9.5 % (ref 20–55)
MCH RBC QN AUTO: 25.3 PG (ref 27–34)
MCHC RBC AUTO-ENTMCNC: 31.2 G/DL (ref 31–37)
MCV RBC AUTO: 81.1 FL (ref 79–99)
MONOCYTES # BLD AUTO: 0.92 X10(3)/MCL (ref 0.3–0.82)
MONOCYTES NFR BLD AUTO: 8 % (ref 4.7–12.5)
MV PEAK A VEL: 0.98 M/S
MV PEAK E VEL: 1.21 M/S
NEUTROPHILS # BLD AUTO: 9.18 X10(3)/MCL (ref 1.78–5.38)
NEUTROPHILS NFR BLD AUTO: 80.3 % (ref 37–73)
NRBC BLD AUTO-RTO: 0 %
PISA TR MAX VEL: 3.14 M/S
PLATELET # BLD AUTO: 376 X10(3)/MCL (ref 140–371)
PLATELET # BLD EST: ADEQUATE 10*3/UL
PMV BLD AUTO: ABNORMAL FL
POCT GLUCOSE: 184 MG/DL (ref 70–110)
POCT GLUCOSE: 188 MG/DL (ref 70–110)
POCT GLUCOSE: 195 MG/DL (ref 70–110)
POCT GLUCOSE: 199 MG/DL (ref 70–110)
POCT GLUCOSE: 205 MG/DL (ref 70–110)
POTASSIUM SERPL-SCNC: 3.8 MMOL/L (ref 3.5–5.1)
POTASSIUM SERPL-SCNC: 3.9 MMOL/L (ref 3.5–5.1)
RA MAJOR: 5.78 CM
RA PRESSURE: 8 MMHG
RA VOL SYS: 65 ML
RBC # BLD AUTO: 2.33 X10(6)/MCL (ref 4–6)
RIGHT ATRIAL AREA: 21 CM2
SODIUM SERPL-SCNC: 133 MMOL/L (ref 135–145)
SODIUM SERPL-SCNC: 136 MMOL/L (ref 135–145)
TDI LATERAL: 0.14 M/S
TDI SEPTAL: 0.1 M/S
TDI: 0.12 M/S
TR MAX PG: 39 MMHG
TRICUSPID ANNULAR PLANE SYSTOLIC EXCURSION: 2.78 CM
TV REST PULMONARY ARTERY PRESSURE: 47 MMHG
UNIT NUMBER: NORMAL
VANCOMYCIN TROUGH SERPL-MCNC: 16 UG/ML (ref 10–20)
WBC # SPEC AUTO: 11.45 X10(3)/MCL (ref 4–11.5)

## 2023-05-18 PROCEDURE — 93010 EKG 12-LEAD: ICD-10-PCS | Mod: ,,, | Performed by: INTERNAL MEDICINE

## 2023-05-18 PROCEDURE — 63600175 PHARM REV CODE 636 W HCPCS: Performed by: FAMILY MEDICINE

## 2023-05-18 PROCEDURE — 25000003 PHARM REV CODE 250: Performed by: FAMILY MEDICINE

## 2023-05-18 PROCEDURE — 93005 ELECTROCARDIOGRAM TRACING: CPT

## 2023-05-18 PROCEDURE — 21400001 HC TELEMETRY ROOM

## 2023-05-18 PROCEDURE — P9017 PLASMA 1 DONOR FRZ W/IN 8 HR: HCPCS | Performed by: FAMILY MEDICINE

## 2023-05-18 PROCEDURE — 36430 TRANSFUSION BLD/BLD COMPNT: CPT

## 2023-05-18 PROCEDURE — 25000003 PHARM REV CODE 250: Performed by: SURGERY

## 2023-05-18 PROCEDURE — 94761 N-INVAS EAR/PLS OXIMETRY MLT: CPT

## 2023-05-18 PROCEDURE — 36415 COLL VENOUS BLD VENIPUNCTURE: CPT | Performed by: FAMILY MEDICINE

## 2023-05-18 PROCEDURE — 85014 HEMATOCRIT: CPT | Performed by: FAMILY MEDICINE

## 2023-05-18 PROCEDURE — 99900035 HC TECH TIME PER 15 MIN (STAT)

## 2023-05-18 PROCEDURE — 85025 COMPLETE CBC W/AUTO DIFF WBC: CPT | Performed by: FAMILY MEDICINE

## 2023-05-18 PROCEDURE — 80202 ASSAY OF VANCOMYCIN: CPT | Performed by: FAMILY MEDICINE

## 2023-05-18 PROCEDURE — 80048 BASIC METABOLIC PNL TOTAL CA: CPT | Performed by: FAMILY MEDICINE

## 2023-05-18 PROCEDURE — 27000221 HC OXYGEN, UP TO 24 HOURS

## 2023-05-18 PROCEDURE — 93010 ELECTROCARDIOGRAM REPORT: CPT | Mod: ,,, | Performed by: INTERNAL MEDICINE

## 2023-05-18 PROCEDURE — P9016 RBC LEUKOCYTES REDUCED: HCPCS | Performed by: FAMILY MEDICINE

## 2023-05-18 PROCEDURE — 94799 UNLISTED PULMONARY SVC/PX: CPT

## 2023-05-18 RX ORDER — HYDROCODONE BITARTRATE AND ACETAMINOPHEN 500; 5 MG/1; MG/1
TABLET ORAL
Status: DISCONTINUED | OUTPATIENT
Start: 2023-05-18 | End: 2023-05-25

## 2023-05-18 RX ORDER — TRANEXAMIC ACID 10 MG/ML
1000 INJECTION, SOLUTION INTRAVENOUS ONCE
Status: COMPLETED | OUTPATIENT
Start: 2023-05-18 | End: 2023-05-18

## 2023-05-18 RX ORDER — FUROSEMIDE 10 MG/ML
20 INJECTION INTRAMUSCULAR; INTRAVENOUS ONCE
Status: COMPLETED | OUTPATIENT
Start: 2023-05-18 | End: 2023-05-18

## 2023-05-18 RX ORDER — CLONIDINE HYDROCHLORIDE 0.1 MG/1
0.2 TABLET ORAL EVERY 6 HOURS PRN
Status: DISCONTINUED | OUTPATIENT
Start: 2023-05-18 | End: 2023-05-21

## 2023-05-18 RX ORDER — HYDRALAZINE HYDROCHLORIDE 20 MG/ML
10 INJECTION INTRAMUSCULAR; INTRAVENOUS EVERY 4 HOURS PRN
Status: DISCONTINUED | OUTPATIENT
Start: 2023-05-18 | End: 2023-05-25 | Stop reason: HOSPADM

## 2023-05-18 RX ADMIN — CLONIDINE HYDROCHLORIDE 0.2 MG: 0.1 TABLET ORAL at 05:05

## 2023-05-18 RX ADMIN — INSULIN ASPART 4 UNITS: 100 INJECTION, SOLUTION INTRAVENOUS; SUBCUTANEOUS at 11:05

## 2023-05-18 RX ADMIN — MUPIROCIN 1 G: 20 OINTMENT TOPICAL at 09:05

## 2023-05-18 RX ADMIN — VANCOMYCIN HYDROCHLORIDE 1750 MG: 750 INJECTION, POWDER, LYOPHILIZED, FOR SOLUTION INTRAVENOUS at 10:05

## 2023-05-18 RX ADMIN — COLLAGENASE SANTYL: 250 OINTMENT TOPICAL at 10:05

## 2023-05-18 RX ADMIN — INSULIN ASPART 2 UNITS: 100 INJECTION, SOLUTION INTRAVENOUS; SUBCUTANEOUS at 05:05

## 2023-05-18 RX ADMIN — TRANEXAMIC ACID 1000 MG: 10 INJECTION, SOLUTION INTRAVENOUS at 06:05

## 2023-05-18 RX ADMIN — PIPERACILLIN AND TAZOBACTAM 4.5 G: 4; .5 INJECTION, POWDER, FOR SOLUTION INTRAVENOUS; PARENTERAL at 09:05

## 2023-05-18 RX ADMIN — PIPERACILLIN AND TAZOBACTAM 4.5 G: 4; .5 INJECTION, POWDER, FOR SOLUTION INTRAVENOUS; PARENTERAL at 11:05

## 2023-05-18 RX ADMIN — FUROSEMIDE 20 MG: 10 INJECTION, SOLUTION INTRAVENOUS at 11:05

## 2023-05-18 RX ADMIN — PIPERACILLIN AND TAZOBACTAM 4.5 G: 4; .5 INJECTION, POWDER, FOR SOLUTION INTRAVENOUS; PARENTERAL at 04:05

## 2023-05-18 NOTE — HOSPITAL COURSE
05/17/2023 no c/o   Pain well controlled   Npo for debridement with dr walton   05/18/2023 pt s/p debridement of foot, + for osteomyelitis and will need 6 weeks of iv abx, Case management working on LTAC placement.     Today: Patient seen and examined at bedside, and chart reviewed.     05/19/2023 case management working on LTAC for iv abx for osteomyelitis.s/p 2U of PRBCs on 05/18/2023 H&H stable this am.  Blood loss likely due to surgery/debridement earlier this week.    05/20/2023 pt without c/o today, Hgb dropped to 7 will give his 3rd unit on hold.  No further bleeding from wound.  Waitingon LTAC approval for 6 weeks of iv abx  05/21/2023 pt was stable seen by surgery around 945 pm, got SOB and diaphoretic around  11:45 pm was seen by telemed, BP elevated and CXR showed worsening pulmonary edema after receiving 1 U of PRBC yesterday evening.  He was ordered lasix and placed on 4L.   Around 12:30 am rapid response was called and pt was intubated by ERMD suspected flash pulmonary edema possible transfusion reaction and elevated /100.  Pt did have elevated BP earlier in the week when he received 2 U of blood and 3rd unit was held at that time and his BP came down.    ABG at time of intubation pt PCO2 was 117  Pt has grown e coli, proteus, morganella all in blood, wound, and tissues from surgery all sensitive to Zosyn.  He is on zosyn day #6 today.  Will likely needs at least 6 weeks of iv abx.  Plan was to send to LTAC due to osteomyelitis and need for long term iv abx.  BP this am is stable and ABG shows improvement.  He is sedated and resting on the vent, arousable with stimulation. Echo done this admit EF 50% with grade 2 diastolic dysfunction.  BNP is elevated. He has been followed by [facility]  05/22/2023 pt stable, weaning started with RT, down to 30%, switched to IMV mode this am due to PCO2 low.  05/23/2023 pt extubated this am doing well, right foot wound still with drainage, continues on iv abx, surgery  to re-eval foot today and see if further debridements will be needed  05/24/2023 pt NPO for surgery today, if stable post op likely d/c to LTAC tomorrow.

## 2023-05-18 NOTE — NURSING
DR. MACK PHONED, UPDATED ON PATIEINT'S INCREASE IN BLOOD PRESSURE AFTER SECOND UNIT OF BLOOD, NEW ORDERS NOTED ON CHART.

## 2023-05-18 NOTE — PROGRESS NOTES
[facility]-Med/Surg  Wound Care    Patient Name:  Honorio Robb   MRN:  [id number]  Date: 5/18/2023  Diagnosis: Gangrene    History:     Past Medical History:   Diagnosis Date    Acute osteomyelitis, multiple sites     left lower extremity    Diabetes mellitus, type 2     Gas gangrene of extremity     left lower extremity    HLD (hyperlipidemia)     Wet gangrene     right lower extremity       Social History     Socioeconomic History    Marital status: Unknown   Tobacco Use    Smoking status: Never    Smokeless tobacco: Never   Substance and Sexual Activity    Alcohol use: Never    Drug use: Never     Social Determinants of Health     Financial Resource Strain: Low Risk     Difficulty of Paying Living Expenses: Not hard at all   Food Insecurity: No Food Insecurity    Worried About Running Out of Food in the Last Year: Never true    Ran Out of Food in the Last Year: Never true   Transportation Needs: No Transportation Needs    Lack of Transportation (Medical): No    Lack of Transportation (Non-Medical): No   Physical Activity: Inactive    Days of Exercise per Week: 0 days    Minutes of Exercise per Session: 0 min   Stress: No Stress Concern Present    Feeling of Stress : Not at all   Social Connections: Moderately Isolated    Frequency of Communication with Friends and Family: More than three times a week    Frequency of Social Gatherings with Friends and Family: More than three times a week    Attends Taoism Services: More than 4 times per year    Active Member of Clubs or Organizations: No    Attends Club or Organization Meetings: Never    Marital Status: Never    Housing Stability: Low Risk     Unable to Pay for Housing in the Last Year: No    Number of Places Lived in the Last Year: 2    Unstable Housing in the Last Year: No       Precautions:     Allergies as of 05/16/2023    (No Known Allergies)       WOC Assessment Details/Treatment        05/18/23 1415   WOCN Assessment   WOCN Total  Time (mins) 45   Visit Date 05/18/23   Visit Time 1330   Consult Type Follow Up   WOCN Speciality Wound   Number of Wounds 3   Intervention assessed;changed;applied   Teaching on-going        Altered Skin Integrity 05/16/23 1335 Right lateral Foot Non pressure chronic ulcer   Date First Assessed/Time First Assessed: 05/16/23 1335   Altered Skin Integrity Present on Admission - Did Patient arrive to the hospital with altered skin?: yes  Side: Right  Orientation: lateral  Location: Foot  Primary Wound Type: Non pressure ...   Wound Image     Dressing Appearance Dried drainage   Drainage Amount Moderate   Drainage Characteristics/Odor Serosanguineous   Appearance Red;Moist   Tissue loss description Full thickness   Red (%), Wound Tissue Color 100 %   Periwound Area Edematous;Redness   Wound Edges Defined   Wound Length (cm) 9 cm   Wound Width (cm) 4.5 cm   Wound Depth (cm) 0.5 cm   Wound Volume (cm^3) 20.25 cm^3   Wound Surface Area (cm^2) 40.5 cm^2   Care Cleansed with:;Sterile normal saline   Dressing Applied;Gauze, wet to moist;Absorptive Pad;Rolled gauze;Elastic bandage   Dressing Change Due 05/18/23  (pm shift)        Altered Skin Integrity 05/16/23 1600 Right plantar Foot Non pressure chronic ulcer   Date First Assessed/Time First Assessed: 05/16/23 1600   Altered Skin Integrity Present on Admission - Did Patient arrive to the hospital with altered skin?: yes  Side: Right  Orientation: plantar  Location: Foot  Primary Wound Type: Non pressure ...   Wound Image     Dressing Appearance Dried drainage;Moist drainage   Drainage Amount Copious   Drainage Characteristics/Odor Purulent;Serosanguineous;Sanguineous   Appearance Red;Black;Gray;Tan;Moist   Tissue loss description Full thickness   Red (%), Wound Tissue Color 10 %   Yellow (%), Wound Tissue Color 90 %   Periwound Area Edematous;Redness   Wound Edges Irregular   Wound Length (cm) 24.5 cm   Wound Width (cm) 14 cm   Wound Depth (cm) 4 cm   Wound Volume  (cm^3) 1372 cm^3   Wound Surface Area (cm^2) 343 cm^2   Care Cleansed with:;Sterile normal saline   Dressing Applied;Gauze, wet to dry;Absorptive Pad;Rolled gauze;Elastic bandage   Dressing Change Due 05/18/23  (pm shift)        05/18/2023

## 2023-05-18 NOTE — PLAN OF CARE
Patient status ongoing, progressing. Will continue monitoring.    Problem: Adult Inpatient Plan of Care  Goal: Plan of Care Review  Outcome: Ongoing, Progressing  Goal: Patient-Specific Goal (Individualized)  Outcome: Ongoing, Progressing  Goal: Absence of Hospital-Acquired Illness or Injury  Outcome: Ongoing, Progressing  Goal: Optimal Comfort and Wellbeing  Outcome: Ongoing, Progressing  Goal: Readiness for Transition of Care  Outcome: Ongoing, Progressing     Problem: Impaired Wound Healing  Goal: Optimal Wound Healing  Outcome: Ongoing, Progressing     Problem: Fall Injury Risk  Goal: Absence of Fall and Fall-Related Injury  Outcome: Ongoing, Progressing     Problem: Pain Acute  Goal: Acceptable Pain Control and Functional Ability  Outcome: Ongoing, Progressing     Problem: Infection  Goal: Absence of Infection Signs and Symptoms  Outcome: Ongoing, Progressing     Problem: Diabetes Comorbidity  Goal: Blood Glucose Level Within Targeted Range  Outcome: Ongoing, Progressing     Problem: Skin Injury Risk Increased  Goal: Skin Health and Integrity  Outcome: Ongoing, Progressing

## 2023-05-18 NOTE — PROGRESS NOTES
Inpatient Nutrition Assessment    Admit Date: 5/16/2023   Total duration of encounter: 2 days     Nutrition Recommendation/Prescription     Continue 2000 ADA Diet, add double protein to trays.    Continue to encourage PO intake and honor preferences.    RD to monitor patients PO Intake, Weight, Labs, Wound healing and adjust MNT as needed.       Communication of Recommendations: reviewed with nurse and reviewed with patient    Nutrition Assessment     Malnutrition Assessment/Nutrition-Focused Physical Exam    Unable to perform at this time.    Chart Review    Reason Seen: continuous nutrition monitoring and RD Screened Moderate Risk    Malnutrition Screening Tool Results   Have you recently lost weight without trying?: No  Have you been eating poorly because of a decreased appetite?: No   MST Score: 0     Diagnosis:  Right foot gangrene, Sepsis, PAD, Leukocytosis, Tachycardia, N/V, Hypoxia, T2DM.    Relevant Medical History: Acute Osteomyelitis, T2DM, Gas Gangrene of extremity, HLD, Wet Gangrene.     Nutrition-Related Medications: Zosyn, Vancomycin.   Calorie Containing IV Medications: no significant kcals from medications at this time    Nutrition-Related Labs:  (5/18): RBC- 2.33L, HGB- 5.9CL, HCT- 18.9CL, Na- 133L, CrCl- 112.3, Glucose- 179H, Ca- 7.4L, POC Glucose- 184,195,205.    Diet/PN Order: Diet diabetic  Oral Supplement Order: none  Tube Feeding Order: none  Appetite/Oral Intake: good/not applicable No recorded PO intake.   Factors Affecting Nutritional Intake: none identified  Food/Sabianism/Cultural Preferences: none reported  Food Allergies: none reported and no known food allergies    Skin Integrity: scab, wound  Wound(s):      Altered Skin Integrity 05/16/23 1335 Right lateral Foot Non pressure chronic ulcer-Tissue loss description: Full thickness       Altered Skin Integrity 05/16/23 1600 Right plantar Foot Non pressure chronic ulcer-Tissue loss description: Full thickness     Comments    (5/18):  "Patient reports poor appetite with minimal PO intake for about 2 months and unintentional weight loss of 20#-25#. Patient now reports starving and still hungry after eating lunch. UBW- 240#. GI: WDL, BRDN: 16, 4+ RIGHT LEG AND 3+ RIGHT KNEE EDEMA, 24 HR I/O: 3433/1500.     Anthropometrics    Height: 5' 10" (177.8 cm) Height Method: Stated  Last Weight: 90.3 kg (199 lb) (23 1925) Weight Method: Bed Scale  BMI (Calculated): 28.6  BMI Classification: overweight (BMI 25-29.9)        Ideal Body Weight (IBW), Male: 166 lb     % Ideal Body Weight, Male (lb): 108.43 %  Amputation %: 5.9  Total Amputation %: 5.9           Usual Body Weight (UBW), k.09 kg  % Usual Body Weight: 82.92     Usual Weight Provided By: patient    Wt Readings from Last 5 Encounters:   23 90.3 kg (199 lb)     Weight Change(s) Since Admission:  Admit Weight: 81.6 kg (180 lb) (23 1445)  (): Patient reports 20-25# weight loss over 2 months and UBW- 240# around November/December showing a 17%) weight loss over 6 months. Weight loss significant per time frame.     Estimated Needs    Weight Used For Calorie Calculations: 68.1 kg (150 lb 2.1 oz) (ABW (BKA))  Energy Calorie Requirements (kcal): 8133-3383 KCAL (25-29 KCAL/KG ABW)  Energy Need Method: Kcal/kg  Weight Used For Protein Calculations: 68.1 kg (150 lb 2.1 oz) (ABW (BKA))  Protein Requirements: 75-89 GM PRO (1.1-1.3 GM/KG ABW)  Fluid Requirements (mL): 2045 ML H2O (30 ML/KG ABW)  Temp (24hrs), Av.2 °F (36.8 °C), Min:96.9 °F (36.1 °C), Max:100.1 °F (37.8 °C)         Enteral Nutrition    Patient not receiving enteral nutrition at this time.    Parenteral Nutrition    Patient not receiving parenteral nutrition support at this time.    Evaluation of Received Nutrient Intake    Calories: meeting estimated needs  Protein: meeting estimated needs    Patient Education    Not applicable.    Nutrition Diagnosis     PES: Increased nutrient needs related to increased protein " energy demand for wound healing as evidenced by conditions associated with diagnosis. (new)    Interventions/Goals     Intervention(s): general/healthful diet, modified composition of meals/snacks, and collaboration with other providers  Goal: Meet Greater than 75% of nutritional needs by discharge. (new)    Monitoring & Evaluation     Dietitian will monitor food and beverage intake, energy intake, weight, weight change, glucose/endocrine profile, and wound healing .  Nutrition Risk/Follow-Up: low (follow-up in 5-7 days)   Please consult if re-assessment needed sooner.

## 2023-05-18 NOTE — HPI
Pt is a 57 y.o. male with hx of DM, PAD hx of Left BKA, now with R foot infection that has been chronic and managed as outpatient after sustaining a burn to his R foot.  He went to wound care today and noted purulent drainage from the foot and was sent for surgical evaluation.  Patient reports issues with swelling and purulent drainage for a while now.  Once in ER spiked a fever and noted to have Tachycardia.  He has some NV as well with concern over aspiration.  He is now on 2LNC and resting comfortably.  HR initially went up to 140-150 but now down in 120s.

## 2023-05-18 NOTE — CONSULTS
Received call from [facility] stated patient converted to AFIB with a rate of 92, Laine Zee with [facility] notified, new orders noted on chart.

## 2023-05-19 LAB
ANION GAP SERPL CALC-SCNC: -2 MEQ/L (ref 2–13)
ANISOCYTOSIS BLD QL SMEAR: ABNORMAL
BASOPHILS # BLD AUTO: 0.06 X10(3)/MCL (ref 0.01–0.08)
BASOPHILS NFR BLD AUTO: 0.6 % (ref 0.1–1.2)
BUN SERPL-MCNC: 14 MG/DL (ref 7–20)
CALCIUM SERPL-MCNC: 8.3 MG/DL (ref 8.4–10.2)
CHLORIDE SERPL-SCNC: 104 MMOL/L (ref 98–110)
CO2 SERPL-SCNC: 34 MMOL/L (ref 21–32)
CREAT SERPL-MCNC: 0.79 MG/DL (ref 0.66–1.25)
CREAT/UREA NIT SERPL: 18 (ref 12–20)
ELLIPTOCYTOSIS (OHS): ABNORMAL
EOSINOPHIL # BLD AUTO: 0.21 X10(3)/MCL (ref 0.04–0.54)
EOSINOPHIL NFR BLD AUTO: 2.2 % (ref 0.7–7)
ERYTHROCYTE [DISTWIDTH] IN BLOOD BY AUTOMATED COUNT: 19.9 %
GFR SERPLBLD CREATININE-BSD FMLA CKD-EPI: >90 MLS/MIN/1.73/M2
GLUCOSE SERPL-MCNC: 120 MG/DL (ref 70–115)
GRAM STN SPEC: NORMAL
HCT VFR BLD AUTO: 25.6 % (ref 36–52)
HGB BLD-MCNC: 8 G/DL (ref 13–18)
IMM GRANULOCYTES # BLD AUTO: 0.03 X10(3)/MCL (ref 0–0.03)
IMM GRANULOCYTES NFR BLD AUTO: 0.3 % (ref 0–0.5)
LYMPHOCYTES # BLD AUTO: 1.14 X10(3)/MCL (ref 1.32–3.57)
LYMPHOCYTES NFR BLD AUTO: 12 % (ref 20–55)
MCH RBC QN AUTO: 25.3 PG (ref 27–34)
MCHC RBC AUTO-ENTMCNC: 31.3 G/DL (ref 31–37)
MCV RBC AUTO: 81 FL (ref 79–99)
MICROCYTES BLD QL SMEAR: ABNORMAL
MONOCYTES # BLD AUTO: 0.62 X10(3)/MCL (ref 0.3–0.82)
MONOCYTES NFR BLD AUTO: 6.5 % (ref 4.7–12.5)
NEUTROPHILS # BLD AUTO: 7.45 X10(3)/MCL (ref 1.78–5.38)
NEUTROPHILS NFR BLD AUTO: 78.4 % (ref 37–73)
NRBC BLD AUTO-RTO: 0 %
PLATELET # BLD AUTO: 345 X10(3)/MCL (ref 140–371)
PLATELET # BLD EST: NORMAL 10*3/UL
PMV BLD AUTO: ABNORMAL FL
POCT GLUCOSE: 119 MG/DL (ref 70–110)
POCT GLUCOSE: 156 MG/DL (ref 70–110)
POCT GLUCOSE: 173 MG/DL (ref 70–110)
POCT GLUCOSE: 242 MG/DL (ref 70–110)
POIKILOCYTOSIS BLD QL SMEAR: ABNORMAL
POTASSIUM SERPL-SCNC: 4.2 MMOL/L (ref 3.5–5.1)
RBC # BLD AUTO: 3.16 X10(6)/MCL (ref 4–6)
RBC MORPH BLD: ABNORMAL
SCHISTOCYTE (OLG): ABNORMAL
SODIUM SERPL-SCNC: 136 MMOL/L (ref 135–145)
WBC # SPEC AUTO: 9.51 X10(3)/MCL (ref 4–11.5)

## 2023-05-19 PROCEDURE — 25000003 PHARM REV CODE 250: Performed by: FAMILY MEDICINE

## 2023-05-19 PROCEDURE — 85025 COMPLETE CBC W/AUTO DIFF WBC: CPT | Performed by: FAMILY MEDICINE

## 2023-05-19 PROCEDURE — 21400001 HC TELEMETRY ROOM

## 2023-05-19 PROCEDURE — 36415 COLL VENOUS BLD VENIPUNCTURE: CPT | Performed by: FAMILY MEDICINE

## 2023-05-19 PROCEDURE — 27000221 HC OXYGEN, UP TO 24 HOURS

## 2023-05-19 PROCEDURE — 80048 BASIC METABOLIC PNL TOTAL CA: CPT | Performed by: FAMILY MEDICINE

## 2023-05-19 PROCEDURE — A4216 STERILE WATER/SALINE, 10 ML: HCPCS | Performed by: FAMILY MEDICINE

## 2023-05-19 PROCEDURE — 25000003 PHARM REV CODE 250: Performed by: SURGERY

## 2023-05-19 PROCEDURE — 63600175 PHARM REV CODE 636 W HCPCS: Performed by: FAMILY MEDICINE

## 2023-05-19 PROCEDURE — 94761 N-INVAS EAR/PLS OXIMETRY MLT: CPT

## 2023-05-19 PROCEDURE — 99900035 HC TECH TIME PER 15 MIN (STAT)

## 2023-05-19 RX ORDER — SODIUM CHLORIDE 0.9 % (FLUSH) 0.9 %
10 SYRINGE (ML) INJECTION
Status: DISCONTINUED | OUTPATIENT
Start: 2023-05-19 | End: 2023-05-25 | Stop reason: HOSPADM

## 2023-05-19 RX ORDER — SODIUM CHLORIDE 0.9 % (FLUSH) 0.9 %
10 SYRINGE (ML) INJECTION EVERY 6 HOURS
Status: DISCONTINUED | OUTPATIENT
Start: 2023-05-19 | End: 2023-05-25 | Stop reason: HOSPADM

## 2023-05-19 RX ADMIN — MUPIROCIN 1 G: 20 OINTMENT TOPICAL at 09:05

## 2023-05-19 RX ADMIN — SODIUM CHLORIDE, PRESERVATIVE FREE 10 ML: 5 INJECTION INTRAVENOUS at 05:05

## 2023-05-19 RX ADMIN — PIPERACILLIN AND TAZOBACTAM 4.5 G: 4; .5 INJECTION, POWDER, FOR SOLUTION INTRAVENOUS; PARENTERAL at 03:05

## 2023-05-19 RX ADMIN — VANCOMYCIN HYDROCHLORIDE 1750 MG: 750 INJECTION, POWDER, LYOPHILIZED, FOR SOLUTION INTRAVENOUS at 11:05

## 2023-05-19 RX ADMIN — COLLAGENASE SANTYL: 250 OINTMENT TOPICAL at 08:05

## 2023-05-19 RX ADMIN — INSULIN ASPART 4 UNITS: 100 INJECTION, SOLUTION INTRAVENOUS; SUBCUTANEOUS at 11:05

## 2023-05-19 RX ADMIN — VANCOMYCIN HYDROCHLORIDE 1750 MG: 750 INJECTION, POWDER, LYOPHILIZED, FOR SOLUTION INTRAVENOUS at 10:05

## 2023-05-19 RX ADMIN — PIPERACILLIN AND TAZOBACTAM 4.5 G: 4; .5 INJECTION, POWDER, FOR SOLUTION INTRAVENOUS; PARENTERAL at 08:05

## 2023-05-19 RX ADMIN — MUPIROCIN 1 G: 20 OINTMENT TOPICAL at 08:05

## 2023-05-19 RX ADMIN — SODIUM CHLORIDE, PRESERVATIVE FREE 10 ML: 5 INJECTION INTRAVENOUS at 01:05

## 2023-05-19 NOTE — PROGRESS NOTES
[facility] Medicine  Progress Note    Patient Name: Honorio Robb  MRN: [id number]  Patient Class: IP- Inpatient   Admission Date: 5/16/2023  Length of Stay: 3 days  Attending Physician: Denis Mosqueda MD  Primary Care Provider: No primary care provider on file.        Subjective:     Principal Problem:Gangrene        HPI:  Pt is a 57 y.o. male with hx of DM, PAD hx of Left BKA, now with R foot infection that has been chronic and managed as outpatient after sustaining a burn to his R foot.  He went to wound care today and noted purulent drainage from the foot and was sent for surgical evaluation.  Patient reports issues with swelling and purulent drainage for a while now.  Once in ER spiked a fever and noted to have Tachycardia.  He has some NV as well with concern over aspiration.  He is now on 2LNC and resting comfortably.  HR initially went up to 140-150 but now down in 120s.           Overview/Hospital Course:     05/17/2023 no c/o   Pain well controlled   Npo for debridement with dr walton   05/18/2023 pt s/p debridement of foot, + for osteomyelitis and will need 6 weeks of iv abx, Case management working on LTAC placement.     Today: Patient seen and examined at bedside, and chart reviewed.     05/19/2023 case management working on LTAC for iv abx for osteomyelitis.s/p 2U of PRBCs on 05/18/2023 H&H stable this am.  Blood loss likely due to surgery/debridement earlier this week.      Interval History:      Review of Systems   Constitutional:  Negative for appetite change, fatigue and fever.   Respiratory:  Negative for cough, shortness of breath and wheezing.    Cardiovascular:  Negative for chest pain and leg swelling.   Gastrointestinal:  Negative for abdominal distention, abdominal pain, constipation, diarrhea, nausea and vomiting.   Skin:  Negative for color change, pallor, rash and wound.   Neurological:  Negative for tremors, syncope and headaches.    Psychiatric/Behavioral:  Negative for agitation and behavioral problems.    Objective:     Vital Signs (Most Recent):  Temp: 98.6 °F (37 °C) (05/18/23 1415)  Pulse: 98 (05/18/23 1415)  Resp: 20 (05/18/23 1415)  BP: (!) 170/94 (05/18/23 1415)  SpO2: 100 % (05/18/23 1415) Vital Signs (24h Range):  Temp:  [96.9 °F (36.1 °C)-100.1 °F (37.8 °C)] 98.6 °F (37 °C)  Pulse:  [] 98  Resp:  [16-20] 20  SpO2:  [91 %-100 %] 100 %  BP: ()/() 170/94     Weight: 90.3 kg (199 lb)  Body mass index is 28.55 kg/m².    Intake/Output Summary (Last 24 hours) at 5/18/2023 1443  Last data filed at 5/18/2023 1215  Gross per 24 hour   Intake 4117.7 ml   Output 2925 ml   Net 1192.7 ml         Physical Exam  Constitutional:       General: He is not in acute distress.     Appearance: Normal appearance. He is normal weight. He is not ill-appearing.   HENT:      Head: Normocephalic and atraumatic.      Nose: Nose normal.   Eyes:      General: No scleral icterus.     Conjunctiva/sclera: Conjunctivae normal.   Cardiovascular:      Rate and Rhythm: Normal rate and regular rhythm.      Pulses: Normal pulses.      Heart sounds: Normal heart sounds.   Pulmonary:      Effort: Pulmonary effort is normal.      Breath sounds: Normal breath sounds.   Abdominal:      General: Abdomen is flat. Bowel sounds are normal.      Palpations: Abdomen is soft.   Skin:     General: Skin is warm and dry.      Findings: No erythema or rash.   Neurological:      General: No focal deficit present.      Mental Status: He is alert and oriented to person, place, and time.   Psychiatric:         Mood and Affect: Mood normal.         Behavior: Behavior normal.         Thought Content: Thought content normal.           Significant Labs: All pertinent labs within the past 24 hours have been reviewed.  BMP:   Recent Labs   Lab 05/18/23  0516   *   K 3.9   CO2 28   BUN 16.0   CREATININE 0.84   CALCIUM 7.4*     CBC:   Recent Labs   Lab 05/16/23  1539  05/17/23  0423 05/18/23  0516   WBC 12.22* 16.83* 11.45   HGB 8.6* 7.3* 5.9*   HCT 28.5* 24.4* 18.9*   * 499* 376*       Significant Imaging: I have reviewed all pertinent imaging results/findings within the past 24 hours.      Assessment/Plan:    Problem: R foot gangrene/possible Necrotizing fascitis   Differential Dx: Gangrene vs Nec fas vs both   Chronic issues affecting care: DM, PAD  Radiology reports reviewed and/or personal interpretation: CT report as above  Tests considered or ordered: Repeat imaging, Art doppler   Plan of care: Admit to ICU for closer monitoring, IV ABX, surgical evaluation for likely amputation.  No sure if foot is salvageable at this point.    Keep NPO     Problem: Sepsis  Chronic issues affecting care: DM  Radiology reports reviewed and/or personal interpretation: as above   Tests considered or ordered: cultures  Plan of care: IV ABX, fluids, close monitoring         Problem: PAD  Chronic issues affecting care: DM  Radiology reports reviewed and/or personal interpretation: CT report as above   Tests considered or ordered: US ART  Plan of care: May need vascular intervention if ischemia affecting wound healing, cardiology aware.      Problem: Leukocytosis  Differential DX: likely infectious source vs other   Chronic issues affecting care: DM, PAD  Radiology reports reviewed and/or personal interpretation: as above   Tests considered or ordered: cultures  Plan of care: IV ABX, surgical evaluation     Problem: Tachycardia  Differential DX: SVT vs sinus tach   Chronic issues affecting care:  Radiology reports reviewed and/or personal interpretation: Tele noted   Tests considered or ordered: EKG, Echo  Plan of care: Fluids, monitor.      Problem: Nausea and vomiting   Differential DX: viral gastroenteritis vs gastroparesis   Chronic issues affecting care: DM  Radiology reports reviewed and/or personal interpretation: CXR neg for asp infiltrate  Tests considered or ordered: imaging    Plan of care: treat with meds prn, if concern over aspiration he is on ABX to cover as well     Problem: Hypoxia  Differential DX: CHF vs asp pna  Chronic issues affecting care: DM  Radiology reports reviewed and/or personal interpretation: CXR as above   Tests considered or ordered: Echo  Plan of care: ABX and monitor and wean when possible.  May need additional workup.                  Chronic medical issues addressed during this admission and plan of care:  DMII - SSI and monitor  PAD - US ART to assess blood flow            No notes have been filed under this hospital service.  Service: Hospital Medicine    VTE Risk Mitigation (From admission, onward)           Ordered     IP VTE LOW RISK PATIENT  Once         05/16/23 2223                    Discharge Planning   MIRYAM: 5/22/2023     Code Status: Full Code   Is the patient medically ready for discharge?:     Reason for patient still in hospital (select all that apply): Patient trending condition, Treatment and Consult recommendations  Discharge Plan A: Long-term acute care facility (LTAC)                  Marcella Reyes MD  Department of Hospital Medicine   [facility]/Surg

## 2023-05-19 NOTE — PROGRESS NOTES
Pharmacokinetic Assessment Follow Up: IV Vancomycin    Vancomycin serum concentration assessment(s):    The trough level was drawn correctly and can be used to guide therapy at this time. The measurement is within the desired definitive target range of 15 to 20 mcg/mL.    Vancomycin Regimen Plan:    Continue current regimen, next level due 5/20 at 1000.    Drug levels (last 3 results):  Recent Labs   Lab Result Units 05/18/23  2115   Vancomycin Trough ug/ml 16.0       Pharmacy will continue to follow and monitor vancomycin.    Please contact pharmacy for questions regarding this assessment.    Thank you for the consult,   Marcella Rodrigez       Patient brief summary:  Honorio Robb is a 57 y.o. male initiated on antimicrobial therapy with IV Vancomycin for treatment of skin & soft tissue infection    The patient's current regimen is 1750mg q12h    Drug Allergies:   Review of patient's allergies indicates:   Allergen Reactions    Dilaudid [hydromorphone]     Iodinated contrast media        Actual Body Weight:   99.2kg    Renal Function:   Estimated Creatinine Clearance: 97.6 mL/min (based on SCr of 1.01 mg/dL).,     Dialysis Method (if applicable):  N/A    CBC (last 72 hours):  Recent Labs   Lab Result Units 05/16/23  1539 05/17/23  0423 05/18/23  0516 05/18/23  1703   WBC x10(3)/mcL 12.22* 16.83* 11.45  --    Hgb g/dL 8.6* 7.3* 5.9* 8.1*   Hemoglobin A1c %  --  5.5  --   --    Hct % 28.5* 24.4* 18.9* 25.2*   Platelet x10(3)/mcL 595* 499* 376*  --    Mono % %  --  4.5* 8.0  --    Monocyte Man % 6  --   --   --    Eos % %  --  0.7 1.6  --    Basophil % %  --  0.3 0.3  --        Metabolic Panel (last 72 hours):  Recent Labs   Lab Result Units 05/16/23  1539 05/17/23  0423 05/18/23  0516 05/18/23  2115   Sodium Level mmol/L 138 137 133* 136   Potassium Level mmol/L 3.9 3.7 3.9 3.8   Chloride mmol/L 102 103 102 102   Carbon Dioxide mmol/L 31 30 28 31   Glucose Level mg/dL 126* 187* 179* 171*   Blood Urea Nitrogen mg/dL  18.0 18.0 16.0 15.0   Creatinine mg/dL 0.73 0.84 0.84 1.01   Albumin Level g/dL 3.1* 2.3*  --   --    Bilirubin Total mg/dL 0.6 0.5  --   --    Alkaline Phosphatase unit/L 269* 239*  --   --    Aspartate Aminotransferase unit/L 30 34  --   --    Alanine Aminotransferase unit/L 30 27  --   --        Vancomycin Administrations:  vancomycin given in the last 96 hours                     vancomycin (VANCOCIN) 1,750 mg in dextrose 5 % (D5W) 500 mL IVPB (mg) 1,750 mg New Bag 05/18/23 1058     1,750 mg New Bag 05/17/23 2246    vancomycin (VANCOCIN) 2,000 mg in dextrose 5 % (D5W) 500 mL IVPB (mg) 2,000 mg New Bag 05/17/23 1043                    Microbiologic Results:  Microbiology Results (last 7 days)       Procedure Component Value Units Date/Time    Blood Culture [[id number]]  (Abnormal) Collected: 05/16/23 1539    Order Status: Completed Specimen: Blood from Arm, Right Updated: 05/18/23 1248     CULTURE, BLOOD ([facility]) Proteus mirabilis     GRAM STAIN 2 of 2 bottles positive      Gram negative bacilli      Seen in gram stain of broth only    Blood Culture [[id number]]  (Abnormal) Collected: 05/16/23 1539    Order Status: Completed Specimen: Blood from Antecubital, Left Updated: 05/18/23 1248     CULTURE, BLOOD ([facility]) Morganella morganii     GRAM STAIN 2 of 2 bottles positive      Seen in gram stain of broth only      Gram negative bacilli    Narrative:      Called to [facility]/RB/ES 0828 will notify md      Tissue Culture - Aerobic [[id number]] Collected: 05/17/23 1527    Order Status: Sent Specimen: Bone from Foot, Right Updated: 05/17/23 1629    Body Fluid Culture [[id number]] Collected: 05/17/23 1527    Order Status: Canceled Specimen: Body Fluid from Foot, Right Updated: 05/17/23 1629    Tissue Culture - Aerobic [[id number]] Collected: 05/17/23 1456    Order Status: Sent Specimen: Tissue from Foot, Right Updated: 05/17/23 1629    Anaerobic Culture [[id number]] Collected: 05/17/23 1527    Order Status: Sent Specimen: Bone from  Foot, Right Updated: 05/17/23 1603    Gram Stain [[id number]] Collected: 05/17/23 1527    Order Status: Sent Specimen: Bone from Foot, Right Updated: 05/17/23 1603    Anaerobic Culture [[id number]] Collected: 05/17/23 1456    Order Status: Sent Specimen: Tissue from Foot, Right Updated: 05/17/23 1602    Gram Stain [[id number]] Collected: 05/17/23 1456    Order Status: Sent Specimen: Tissue from Foot, Right Updated: 05/17/23 1602    Wound Culture [[id number]] Collected: 05/17/23 1013    Order Status: Sent Specimen: Wound from Leg, Right Updated: 05/17/23 1115    BCID2 Panel [[id number]]  (Abnormal) Collected: 05/16/23 1539    Order Status: Completed Specimen: Blood from Antecubital, Left Updated: 05/17/23 0832     CTX-M (ESBL ) Not Detected     IMP (Cabapenemase ) Not Detected     KPC resistance gene (Carbapenemase ) Not Detected     mcr-1 N/A     mecA ID N/A     mecA/C and MREJ (MRSA) gene N/A     NDM (Carbapenemase ) Not Detected     OXA-48-like (Carbapenemase ) Not Detected     Lokesh/B (VRE gene) N/A     VIM (Carbapenemase ) Not Detected     Enterococcus faecalis Not Detected     Enterococcus faecium Not Detected     Listeria monocytogenes Not Detected     Staphylococcus spp. Not Detected     Staphylococcus aureus Not Detected     Staphylococcus epidermidis Not Detected     Staphylococcus lugdunensis Not Detected     Streptococcus spp. Not Detected     Streptococcus agalactiae (Group B) Not Detected     Streptococcus pneumoniae Not Detected     Streptococcus pyogenes (Group A) Not Detected     Acinetobacter calcoaceticus/baumannii complex Not Detected     Bacteroides fragilis Not Detected     Enterobacterales Detected     Enterobacter cloacae complex Not Detected     Escherichia coli Not Detected     Klebsiella aerogenes Not Detected     Klebsiella oxytoca Not Detected     Klebsiella pneumoniae group Not Detected     Proteus spp. Detected     Salmonella spp. Not Detected      Serratia marcescens Not Detected     Haemophilus influenzae Not Detected     Neisseria meningitidis Not Detected     Pseudomonas aeruginosa Not Detected     Stenotrophomonas maltophilia Not Detected     Candida albicans Not Detected     Candida auris Not Detected     Candida glabrata Not Detected     Candida krusei Not Detected     Candida parapsilosis Not Detected     Candida tropicalis Not Detected     Cryptococcus neoformans/gattii Not Detected    Narrative:      The [facility] BCID2 Panel is a multiplexed nucleic acid test intended for the use with [employer]® 2.0 or [employer]® [employer] for the simultaneous qualitative detection and identification of multiple bacterial and yeast nucleic acids and select genetic determinants associated with antimicrobial resistance.  The [facility] BCID2 Panel test is performed directly on blood culture samples identified as positive by a continuous monitoring blood culture system.  Results are intended to be interpreted in conjunction with Gram stain results.

## 2023-05-20 PROBLEM — D62 ACUTE BLOOD LOSS ANEMIA: Status: ACTIVE | Noted: 2023-05-20

## 2023-05-20 LAB
ABO + RH BLD: NORMAL
ANION GAP SERPL CALC-SCNC: 3 MEQ/L (ref 2–13)
ANISOCYTOSIS BLD QL SMEAR: ABNORMAL
BACTERIA BLD CULT: ABNORMAL
BACTERIA SPEC CULT: ABNORMAL
BLD PROD TYP BPU: NORMAL
BLOOD UNIT EXPIRATION DATE: NORMAL
BLOOD UNIT TYPE CODE: 6200
BUN SERPL-MCNC: 13 MG/DL (ref 7–20)
CALCIUM SERPL-MCNC: 7.9 MG/DL (ref 8.4–10.2)
CHLORIDE SERPL-SCNC: 104 MMOL/L (ref 98–110)
CO2 SERPL-SCNC: 31 MMOL/L (ref 21–32)
CREAT SERPL-MCNC: 0.7 MG/DL (ref 0.66–1.25)
CREAT/UREA NIT SERPL: 19 (ref 12–20)
CROSSMATCH INTERPRETATION: NORMAL
DISPENSE STATUS: NORMAL
ELLIPTOCYTOSIS (OHS): ABNORMAL
ERYTHROCYTE [DISTWIDTH] IN BLOOD BY AUTOMATED COUNT: 19.6 %
GFR SERPLBLD CREATININE-BSD FMLA CKD-EPI: >90 MLS/MIN/1.73/M2
GLUCOSE SERPL-MCNC: 179 MG/DL (ref 70–115)
GRAM STN SPEC: ABNORMAL
HCT VFR BLD AUTO: 23.5 % (ref 36–52)
HGB BLD-MCNC: 7.4 G/DL (ref 13–18)
MCH RBC QN AUTO: 26.1 PG (ref 27–34)
MCHC RBC AUTO-ENTMCNC: 31.5 G/DL (ref 31–37)
MCV RBC AUTO: 83 FL (ref 79–99)
MICROCYTES BLD QL SMEAR: ABNORMAL
NRBC BLD AUTO-RTO: 0 %
PLATELET # BLD AUTO: 394 X10(3)/MCL (ref 140–371)
PLATELET # BLD EST: ABNORMAL 10*3/UL
PMV BLD AUTO: ABNORMAL FL
POCT GLUCOSE: 142 MG/DL (ref 70–110)
POCT GLUCOSE: 149 MG/DL (ref 70–110)
POCT GLUCOSE: 165 MG/DL (ref 70–110)
POCT GLUCOSE: 179 MG/DL (ref 70–110)
POCT GLUCOSE: 216 MG/DL (ref 70–110)
POIKILOCYTOSIS BLD QL SMEAR: ABNORMAL
POTASSIUM SERPL-SCNC: 3.9 MMOL/L (ref 3.5–5.1)
RBC # BLD AUTO: 2.83 X10(6)/MCL (ref 4–6)
RBC MORPH BLD: ABNORMAL
SCHISTOCYTE (OLG): ABNORMAL
SODIUM SERPL-SCNC: 138 MMOL/L (ref 135–145)
UNIT NUMBER: NORMAL
VANCOMYCIN TROUGH SERPL-MCNC: 16.8 UG/ML (ref 10–20)
WBC # SPEC AUTO: 9.48 X10(3)/MCL (ref 4–11.5)

## 2023-05-20 PROCEDURE — A4216 STERILE WATER/SALINE, 10 ML: HCPCS | Performed by: FAMILY MEDICINE

## 2023-05-20 PROCEDURE — 36415 COLL VENOUS BLD VENIPUNCTURE: CPT | Performed by: FAMILY MEDICINE

## 2023-05-20 PROCEDURE — 25000003 PHARM REV CODE 250: Performed by: FAMILY MEDICINE

## 2023-05-20 PROCEDURE — 80202 ASSAY OF VANCOMYCIN: CPT | Performed by: FAMILY MEDICINE

## 2023-05-20 PROCEDURE — 63600175 PHARM REV CODE 636 W HCPCS: Performed by: FAMILY MEDICINE

## 2023-05-20 PROCEDURE — 93010 EKG 12-LEAD: ICD-10-PCS | Mod: ,,, | Performed by: INTERNAL MEDICINE

## 2023-05-20 PROCEDURE — 36430 TRANSFUSION BLD/BLD COMPNT: CPT

## 2023-05-20 PROCEDURE — 93010 ELECTROCARDIOGRAM REPORT: CPT | Mod: ,,, | Performed by: INTERNAL MEDICINE

## 2023-05-20 PROCEDURE — P9016 RBC LEUKOCYTES REDUCED: HCPCS | Performed by: FAMILY MEDICINE

## 2023-05-20 PROCEDURE — 99900035 HC TECH TIME PER 15 MIN (STAT)

## 2023-05-20 PROCEDURE — 93005 ELECTROCARDIOGRAM TRACING: CPT

## 2023-05-20 PROCEDURE — 94761 N-INVAS EAR/PLS OXIMETRY MLT: CPT

## 2023-05-20 PROCEDURE — 21400001 HC TELEMETRY ROOM

## 2023-05-20 PROCEDURE — 85027 COMPLETE CBC AUTOMATED: CPT | Performed by: FAMILY MEDICINE

## 2023-05-20 PROCEDURE — 80048 BASIC METABOLIC PNL TOTAL CA: CPT | Performed by: FAMILY MEDICINE

## 2023-05-20 RX ORDER — HYDROCODONE BITARTRATE AND ACETAMINOPHEN 500; 5 MG/1; MG/1
TABLET ORAL
Status: DISCONTINUED | OUTPATIENT
Start: 2023-05-20 | End: 2023-05-25

## 2023-05-20 RX ORDER — METFORMIN HYDROCHLORIDE 500 MG/1
500 TABLET ORAL 2 TIMES DAILY WITH MEALS
Status: DISCONTINUED | OUTPATIENT
Start: 2023-05-20 | End: 2023-05-21

## 2023-05-20 RX ADMIN — MUPIROCIN 1 G: 20 OINTMENT TOPICAL at 08:05

## 2023-05-20 RX ADMIN — SODIUM CHLORIDE, PRESERVATIVE FREE 10 ML: 5 INJECTION INTRAVENOUS at 12:05

## 2023-05-20 RX ADMIN — SODIUM CHLORIDE, PRESERVATIVE FREE 10 ML: 5 INJECTION INTRAVENOUS at 11:05

## 2023-05-20 RX ADMIN — VANCOMYCIN HYDROCHLORIDE 1750 MG: 750 INJECTION, POWDER, LYOPHILIZED, FOR SOLUTION INTRAVENOUS at 10:05

## 2023-05-20 RX ADMIN — PIPERACILLIN AND TAZOBACTAM 4.5 G: 4; .5 INJECTION, POWDER, FOR SOLUTION INTRAVENOUS; PARENTERAL at 08:05

## 2023-05-20 RX ADMIN — INSULIN ASPART 4 UNITS: 100 INJECTION, SOLUTION INTRAVENOUS; SUBCUTANEOUS at 07:05

## 2023-05-20 RX ADMIN — PIPERACILLIN AND TAZOBACTAM 4.5 G: 4; .5 INJECTION, POWDER, FOR SOLUTION INTRAVENOUS; PARENTERAL at 12:05

## 2023-05-20 RX ADMIN — SODIUM CHLORIDE, PRESERVATIVE FREE 10 ML: 5 INJECTION INTRAVENOUS at 05:05

## 2023-05-20 RX ADMIN — PIPERACILLIN AND TAZOBACTAM 4.5 G: 4; .5 INJECTION, POWDER, FOR SOLUTION INTRAVENOUS; PARENTERAL at 05:05

## 2023-05-20 RX ADMIN — METFORMIN HYDROCHLORIDE 500 MG: 500 TABLET, FILM COATED ORAL at 05:05

## 2023-05-20 RX ADMIN — VANCOMYCIN HYDROCHLORIDE 1750 MG: 750 INJECTION, POWDER, LYOPHILIZED, FOR SOLUTION INTRAVENOUS at 11:05

## 2023-05-20 RX ADMIN — COLLAGENASE SANTYL: 250 OINTMENT TOPICAL at 08:05

## 2023-05-20 NOTE — ASSESSMENT & PLAN NOTE
Patient's FSGs are controlled on current medication regimen.  Last A1c reviewed-   Lab Results   Component Value Date    HGBA1C 5.5 05/17/2023     Most recent fingerstick glucose reviewed-   Recent Labs   Lab 05/19/23  1619 05/19/23  2104 05/20/23  0713 05/20/23  1049   POCTGLUCOSE 156* 173* 216* 142*     Current correctional scale  Low  Maintain anti-hyperglycemic dose as follows-   Antihyperglycemics (From admission, onward)    Start     Stop Route Frequency Ordered    05/17/23 1020  insulin aspart U-100 pen 1-10 Units         -- SubQ Before meals & nightly PRN 05/17/23 0920        Continue iv abx  Start metformin 500mg bid
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
## 2023-05-20 NOTE — SUBJECTIVE & OBJECTIVE
Interval History:      Review of Systems   Constitutional:  Negative for appetite change, fatigue and fever.   Respiratory:  Negative for cough, shortness of breath and wheezing.    Cardiovascular:  Negative for chest pain and leg swelling.   Gastrointestinal:  Negative for abdominal distention, abdominal pain, constipation, diarrhea, nausea and vomiting.   Skin:  Negative for color change, pallor, rash and wound.   Neurological:  Negative for tremors, syncope and headaches.   Psychiatric/Behavioral:  Negative for agitation and behavioral problems.    Objective:     Vital Signs (Most Recent):  Temp: 98.1 °F (36.7 °C) (05/20/23 1050)  Pulse: 89 (05/20/23 1050)  Resp: 16 (05/20/23 1050)  BP: (!) 165/90 (05/20/23 1050)  SpO2: 98 % (05/20/23 1050) Vital Signs (24h Range):  Temp:  [97.6 °F (36.4 °C)-99.8 °F (37.7 °C)] 98.1 °F (36.7 °C)  Pulse:  [64-94] 89  Resp:  [16-20] 16  SpO2:  [94 %-98 %] 98 %  BP: (118-165)/(58-90) 165/90     Weight: 94 kg (207 lb 4.8 oz)  Body mass index is 29.74 kg/m².    Intake/Output Summary (Last 24 hours) at 5/20/2023 1347  Last data filed at 5/20/2023 0620  Gross per 24 hour   Intake 620 ml   Output 2065 ml   Net -1445 ml           Physical Exam  Constitutional:       General: He is not in acute distress.     Appearance: Normal appearance. He is normal weight. He is not ill-appearing.   HENT:      Head: Normocephalic and atraumatic.      Nose: Nose normal.   Eyes:      General: No scleral icterus.     Conjunctiva/sclera: Conjunctivae normal.   Cardiovascular:      Rate and Rhythm: Normal rate and regular rhythm.      Pulses: Normal pulses.      Heart sounds: Normal heart sounds.   Pulmonary:      Effort: Pulmonary effort is normal.      Breath sounds: Normal breath sounds.   Abdominal:      General: Abdomen is flat. Bowel sounds are normal.      Palpations: Abdomen is soft.   Skin:     General: Skin is warm and dry.      Findings: No erythema or rash.   Neurological:      General: No focal  deficit present.      Mental Status: He is alert and oriented to person, place, and time.   Psychiatric:         Mood and Affect: Mood normal.         Behavior: Behavior normal.         Thought Content: Thought content normal.           Significant Labs: All pertinent labs within the past 24 hours have been reviewed.  BMP:   Recent Labs   Lab 05/20/23  0411      K 3.9   CO2 31   BUN 13.0   CREATININE 0.70   CALCIUM 7.9*       CBC:   Recent Labs   Lab 05/18/23  1703 05/19/23  0707 05/20/23 0411   WBC  --  9.51 9.48   HGB 8.1* 8.0* 7.4*   HCT 25.2* 25.6* 23.5*   PLT  --  345 394*         Significant Imaging: I have reviewed all pertinent imaging results/findings within the past 24 hours.

## 2023-05-20 NOTE — PROGRESS NOTES
Pharmacokinetic Assessment Follow Up: IV Vancomycin    Vancomycin serum concentration assessment(s):    The trough level was drawn correctly and can be used to guide therapy at this time. The measurement is within the desired definitive target range of 15 to 20 mcg/mL.    Vancomycin Regimen Plan:    Continue regimen to Vancomycin 1750 mg IV every 12 hours with next serum trough concentration measured at 60 min prior to 11AM dose on 5-22-23    Drug levels (last 3 results):  Recent Labs   Lab Result Units 05/18/23 2115 05/20/23  0955   Vancomycin Trough ug/ml 16.0 16.8       Pharmacy will continue to follow and monitor vancomycin.    Please contact pharmacy at [phone] for questions regarding this assessment.    Thank you for the consult,   Santana Moseley       Patient brief summary:  Honorio Robb is a 57 y.o. male initiated on antimicrobial therapy with IV Vancomycin for treatment of skin & soft tissue infection      Drug Allergies:   Review of patient's allergies indicates:   Allergen Reactions    Dilaudid [hydromorphone]     Iodinated contrast media        Renal Function:   Estimated Creatinine Clearance: 137.3 mL/min (based on SCr of 0.7 mg/dL).,       CBC (last 72 hours):  Recent Labs   Lab Result Units 05/18/23  0516 05/18/23  1703 05/19/23  0707 05/20/23  0411   WBC x10(3)/mcL 11.45  --  9.51 9.48   Hgb g/dL 5.9* 8.1* 8.0* 7.4*   Hct % 18.9* 25.2* 25.6* 23.5*   Platelet x10(3)/mcL 376*  --  345 394*   Mono % % 8.0  --  6.5  --    Eos % % 1.6  --  2.2  --    Basophil % % 0.3  --  0.6  --        Metabolic Panel (last 72 hours):  Recent Labs   Lab Result Units 05/18/23  0516 05/18/23 2115 05/19/23  0707 05/20/23  0411   Sodium Level mmol/L 133* 136 136 138   Potassium Level mmol/L 3.9 3.8 4.2 3.9   Chloride mmol/L 102 102 104 104   Carbon Dioxide mmol/L 28 31 34* 31   Glucose Level mg/dL 179* 171* 120* 179*   Blood Urea Nitrogen mg/dL 16.0 15.0 14.0 13.0   Creatinine mg/dL 0.84 1.01 0.79 0.70        Microbiologic Results:  Microbiology Results (last 7 days)       Procedure Component Value Units Date/Time    Tissue Culture - Aerobic [[id number]]  (Abnormal)  (Susceptibility) Collected: 05/17/23 1456    Order Status: Completed Specimen: Tissue from Foot, Right Updated: 05/20/23 1054     CULTURE, TISSUE- AEROBIC ([facility]) Many Proteus mirabilis      Few Gram-negative Rods    Tissue Culture - Aerobic [[id number]]  (Abnormal)  (Susceptibility) Collected: 05/17/23 1527    Order Status: Completed Specimen: Bone from Foot, Right Updated: 05/20/23 1054     CULTURE, TISSUE- AEROBIC ([facility]) Moderate Proteus mirabilis    Wound Culture [[id number]]  (Abnormal) Collected: 05/17/23 1013    Order Status: Completed Specimen: Wound from Leg, Right Updated: 05/20/23 1017     Wound Culture Many Presumptive proteus species      Many Gram-negative Rods    Anaerobic Culture [[id number]] Collected: 05/17/23 1456    Order Status: Completed Specimen: Tissue from Foot, Right Updated: 05/20/23 0759     Anaerobe Culture No Anaerobes Isolated    Blood Culture [[id number]]  (Abnormal)  (Susceptibility) Collected: 05/16/23 1539    Order Status: Completed Specimen: Blood from Antecubital, Left Updated: 05/20/23 0651     CULTURE, BLOOD ([facility]) Morganella morganii      Gram-negative Rods     GRAM STAIN 2 of 2 bottles positive      Seen in gram stain of broth only      Gram negative bacilli    Narrative:      Called to [facility]/RB/ES 0828 will notify md      Gram Stain [[id number]] Collected: 05/17/23 1527    Order Status: Completed Specimen: Bone from Foot, Right Updated: 05/19/23 0733     GRAM STAIN Many WBC observed      Many Gram positive cocci      Moderate Gram Positive Rods      Moderate Gram Negative Rods    Gram Stain [[id number]] Collected: 05/17/23 1456    Order Status: Completed Specimen: Tissue from Foot, Right Updated: 05/19/23 0729     GRAM STAIN Many WBC observed      No bacteria seen    Blood Culture [[id number]]  (Abnormal)   (Susceptibility) Collected: 05/16/23 1539    Order Status: Resulted Specimen: Blood from Arm, Right Updated: 05/19/23 0710     CULTURE, BLOOD ([facility]) Proteus mirabilis     GRAM STAIN 2 of 2 bottles positive      Gram negative bacilli      Seen in gram stain of broth only    Body Fluid Culture [[id number]] Collected: 05/17/23 1527    Order Status: Canceled Specimen: Body Fluid from Foot, Right Updated: 05/17/23 1629    Anaerobic Culture [[id number]] Collected: 05/17/23 1527    Order Status: Resulted Specimen: Bone from Foot, Right Updated: 05/17/23 1603    BCID2 Panel [[id number]]  (Abnormal) Collected: 05/16/23 1539    Order Status: Completed Specimen: Blood from Antecubital, Left Updated: 05/17/23 0832     CTX-M (ESBL ) Not Detected     IMP (Cabapenemase ) Not Detected     KPC resistance gene (Carbapenemase ) Not Detected     mcr-1 N/A     mecA ID N/A     mecA/C and MREJ (MRSA) gene N/A     NDM (Carbapenemase ) Not Detected     OXA-48-like (Carbapenemase ) Not Detected     Lokesh/B (VRE gene) N/A     VIM (Carbapenemase ) Not Detected     Enterococcus faecalis Not Detected     Enterococcus faecium Not Detected     Listeria monocytogenes Not Detected     Staphylococcus spp. Not Detected     Staphylococcus aureus Not Detected     Staphylococcus epidermidis Not Detected     Staphylococcus lugdunensis Not Detected     Streptococcus spp. Not Detected     Streptococcus agalactiae (Group B) Not Detected     Streptococcus pneumoniae Not Detected     Streptococcus pyogenes (Group A) Not Detected     Acinetobacter calcoaceticus/baumannii complex Not Detected     Bacteroides fragilis Not Detected     Enterobacterales Detected     Enterobacter cloacae complex Not Detected     Escherichia coli Not Detected     Klebsiella aerogenes Not Detected     Klebsiella oxytoca Not Detected     Klebsiella pneumoniae group Not Detected     Proteus spp. Detected     Salmonella spp. Not Detected      Serratia marcescens Not Detected     Haemophilus influenzae Not Detected     Neisseria meningitidis Not Detected     Pseudomonas aeruginosa Not Detected     Stenotrophomonas maltophilia Not Detected     Candida albicans Not Detected     Candida auris Not Detected     Candida glabrata Not Detected     Candida krusei Not Detected     Candida parapsilosis Not Detected     Candida tropicalis Not Detected     Cryptococcus neoformans/gattii Not Detected    Narrative:      The [employer] BCID2 Panel is a multiplexed nucleic acid test intended for the use with [employer]® 2.0 or [employer] for the simultaneous qualitative detection and identification of multiple bacterial and yeast nucleic acids and select genetic determinants associated with antimicrobial resistance.  The [employer] BCID2 Panel test is performed directly on blood culture samples identified as positive by a continuous monitoring blood culture system.  Results are intended to be interpreted in conjunction with Gram stain results.

## 2023-05-20 NOTE — ASSESSMENT & PLAN NOTE
Right foot osteomyelitis  Will need 6 weeks of iv abx per surgery  Case management working on LTAC

## 2023-05-21 LAB
ALBUMIN SERPL-MCNC: 2.5 G/DL (ref 3.4–5)
ALBUMIN/GLOB SERPL: 0.8 RATIO
ALP SERPL-CCNC: 237 UNIT/L (ref 50–144)
ALT SERPL-CCNC: 35 UNIT/L (ref 1–45)
ANION GAP SERPL CALC-SCNC: 3 MEQ/L (ref 2–13)
ANION GAP SERPL CALC-SCNC: 4 MEQ/L (ref 2–13)
ANISOCYTOSIS BLD QL SMEAR: ABNORMAL
ANTIBODY SCREEN: NORMAL
AST SERPL-CCNC: 33 UNIT/L (ref 17–59)
BACTERIA BLD CULT: ABNORMAL
BACTERIA BLD CULT: ABNORMAL
BACTERIA SPEC CULT: ABNORMAL
BASE EXCESS BLD CALC-SCNC: 0.8 MMOL/L (ref -2–2)
BASE EXCESS BLD CALC-SCNC: 6.4 MMOL/L (ref -2–2)
BASE EXCESS BLD CALC-SCNC: 7.6 MMOL/L (ref -2–2)
BASOPHILS # BLD AUTO: 0.13 X10(3)/MCL (ref 0.01–0.08)
BASOPHILS NFR BLD AUTO: 0.9 % (ref 0.1–1.2)
BILIRUBIN DIRECT+TOT PNL SERPL-MCNC: 0.3 MG/DL (ref 0–0.3)
BILIRUBIN DIRECT+TOT PNL SERPL-MCNC: 0.5 MG/DL (ref 0–1)
BLOOD GAS SAMPLE TYPE (OHS): ABNORMAL
BNP BLD-MCNC: 6200 PG/ML (ref 10–450)
BUN SERPL-MCNC: 15 MG/DL (ref 7–20)
BUN SERPL-MCNC: 16 MG/DL (ref 7–20)
CALCIUM SERPL-MCNC: 8.2 MG/DL (ref 8.4–10.2)
CALCIUM SERPL-MCNC: 8.6 MG/DL (ref 8.4–10.2)
CHLORIDE SERPL-SCNC: 101 MMOL/L (ref 98–110)
CHLORIDE SERPL-SCNC: 103 MMOL/L (ref 98–110)
CO2 SERPL-SCNC: 32 MMOL/L (ref 21–32)
CO2 SERPL-SCNC: 32 MMOL/L (ref 21–32)
COHGB MFR BLDA: 1.5 % (ref 0–1.5)
CREAT SERPL-MCNC: 0.99 MG/DL (ref 0.66–1.25)
CREAT SERPL-MCNC: 1.01 MG/DL (ref 0.66–1.25)
CREAT/UREA NIT SERPL: 15 (ref 12–20)
CREAT/UREA NIT SERPL: 16 (ref 12–20)
ELLIPTOCYTOSIS (OHS): ABNORMAL
EOSINOPHIL # BLD AUTO: 0.3 X10(3)/MCL (ref 0.04–0.54)
EOSINOPHIL NFR BLD AUTO: 2 % (ref 0.7–7)
ERYTHROCYTE [DISTWIDTH] IN BLOOD BY AUTOMATED COUNT: 19.2 %
ERYTHROCYTE [DISTWIDTH] IN BLOOD BY AUTOMATED COUNT: 19.4 %
FIO2 BLOOD GAS (OHS): 100 %
FIO2 BLOOD GAS (OHS): 28 %
FIO2 BLOOD GAS (OHS): 40 %
GFR SERPLBLD CREATININE-BSD FMLA CKD-EPI: 88 MLS/MIN/1.73/M2
GFR SERPLBLD CREATININE-BSD FMLA CKD-EPI: 90 MLS/MIN/1.73/M2
GLOBULIN SER-MCNC: 3.2 GM/DL (ref 2–3.9)
GLUCOSE SERPL-MCNC: 163 MG/DL (ref 70–115)
GLUCOSE SERPL-MCNC: 235 MG/DL (ref 70–115)
GRAM STN SPEC: ABNORMAL
HAPTOGLOB SERPL-MCNC: 225 MG/DL (ref 14–258)
HCO3 BLDA-SCNC: 24.8 MMOL/L (ref 22–26)
HCO3 BLDA-SCNC: 30.2 MMOL/L (ref 22–26)
HCO3 BLDA-SCNC: 31.4 MMOL/L (ref 22–26)
HCT VFR BLD AUTO: 26.9 % (ref 36–52)
HCT VFR BLD AUTO: 33.4 % (ref 36–52)
HGB BLD-MCNC: 10.5 G/DL (ref 13–18)
HGB BLD-MCNC: 8.6 G/DL (ref 13–18)
IMM GRANULOCYTES # BLD AUTO: 0.21 X10(3)/MCL (ref 0–0.03)
IMM GRANULOCYTES NFR BLD AUTO: 1.4 % (ref 0–0.5)
IP (OHS): 0 CMH2O
IP (OHS): 0 CMH2O
LACTATE SERPL-SCNC: 0.8 MMOL/L (ref 0.4–2)
LPM (OHS): 2
LYMPHOCYTES # BLD AUTO: 2.05 X10(3)/MCL (ref 1.32–3.57)
LYMPHOCYTES NFR BLD AUTO: 13.8 % (ref 20–55)
MCH RBC QN AUTO: 26.3 PG (ref 27–34)
MCH RBC QN AUTO: 26.5 PG (ref 27–34)
MCHC RBC AUTO-ENTMCNC: 31.4 G/DL (ref 31–37)
MCHC RBC AUTO-ENTMCNC: 32 G/DL (ref 31–37)
MCV RBC AUTO: 83 FL (ref 79–99)
MCV RBC AUTO: 83.7 FL (ref 79–99)
MECH RR (OHS): 12 B/MIN
MECH RR (OHS): 12 B/MIN
MECH VT (OHS): 500 ML
MECH VT (OHS): 500 ML
METHGB MFR BLDA: 0.2 % (ref 0–1.5)
METHGB MFR BLDA: ABNORMAL %
METHGB MFR BLDA: ABNORMAL %
MICROCYTES BLD QL SMEAR: ABNORMAL
MODE (OHS): AC
MODE (OHS): AC
MONOCYTES # BLD AUTO: 0.54 X10(3)/MCL (ref 0.3–0.82)
MONOCYTES NFR BLD AUTO: 3.6 % (ref 4.7–12.5)
NEUTROPHILS # BLD AUTO: 11.64 X10(3)/MCL (ref 1.78–5.38)
NEUTROPHILS NFR BLD AUTO: 78.3 % (ref 37–73)
NOTIFIED (OHS): ABNORMAL
NOTIFIED (OHS): ABNORMAL
NOTIFIED BY (OHS): ABNORMAL
NOTIFIED BY (OHS): ABNORMAL
NOTIFIED TIME (OHS): 27
NRBC BLD AUTO-RTO: 0 %
NRBC BLD AUTO-RTO: 0 %
OXYGEN DEVICE BLOOD GAS (OHS): ABNORMAL
OXYGEN DEVICE BLOOD GAS (OHS): ABNORMAL
PCO2 BLDA: 117 MMHG (ref 35–45)
PCO2 BLDA: 51.7 MMHG (ref 35–45)
PCO2 BLDA: 53.9 MMHG (ref 35–45)
PEEP (OHS): 10 CMH2O
PEEP (OHS): 10 CMH2O
PH BLDA: 7.07 [PH] (ref 7.35–7.45)
PH BLDA: 7.39 [PH] (ref 7.35–7.45)
PH BLDA: 7.42 [PH] (ref 7.35–7.45)
PIP (OHS): 0 CMH20
PLATELET # BLD AUTO: 420 X10(3)/MCL (ref 140–371)
PLATELET # BLD AUTO: 538 X10(3)/MCL (ref 140–371)
PLATELET # BLD EST: ABNORMAL 10*3/UL
PMV BLD AUTO: 12.5 FL (ref 9.4–12.4)
PMV BLD AUTO: ABNORMAL FL
PO2 BLDA: 121 MMHG (ref 80–105)
PO2 BLDA: 271 MMHG (ref 80–105)
PO2 BLDA: 60.9 MMHG (ref 80–105)
POCT GLUCOSE: 119 MG/DL (ref 70–110)
POCT GLUCOSE: 122 MG/DL (ref 70–110)
POCT GLUCOSE: 153 MG/DL (ref 70–110)
POIKILOCYTOSIS BLD QL SMEAR: ABNORMAL
POTASSIUM SERPL-SCNC: 4.1 MMOL/L (ref 3.5–5.1)
POTASSIUM SERPL-SCNC: 5.1 MMOL/L (ref 3.5–5.1)
PROT SERPL-MCNC: 5.7 GM/DL (ref 6.3–8.2)
RBC # BLD AUTO: 3.24 X10(6)/MCL (ref 4–6)
RBC # BLD AUTO: 3.99 X10(6)/MCL (ref 4–6)
RBC MORPH BLD: ABNORMAL
RBCS: NORMAL
SAO2 % BLDA: 100 % (ref 95–100)
SAO2 % BLDA: 77 % (ref 95–100)
SAO2 % BLDA: 99.5 % (ref 95–100)
SCHISTOCYTE (OLG): ABNORMAL
SODIUM SERPL-SCNC: 137 MMOL/L (ref 135–145)
SODIUM SERPL-SCNC: 138 MMOL/L (ref 135–145)
WBC # SPEC AUTO: 14.87 X10(3)/MCL (ref 4–11.5)
WBC # SPEC AUTO: 15.23 X10(3)/MCL (ref 4–11.5)

## 2023-05-21 PROCEDURE — 99900035 HC TECH TIME PER 15 MIN (STAT)

## 2023-05-21 PROCEDURE — 25000003 PHARM REV CODE 250: Performed by: INTERNAL MEDICINE

## 2023-05-21 PROCEDURE — 83880 ASSAY OF NATRIURETIC PEPTIDE: CPT | Performed by: INTERNAL MEDICINE

## 2023-05-21 PROCEDURE — 63600175 PHARM REV CODE 636 W HCPCS: Performed by: FAMILY MEDICINE

## 2023-05-21 PROCEDURE — 80076 HEPATIC FUNCTION PANEL: CPT | Performed by: INTERNAL MEDICINE

## 2023-05-21 PROCEDURE — 94002 VENT MGMT INPAT INIT DAY: CPT

## 2023-05-21 PROCEDURE — 25000003 PHARM REV CODE 250: Performed by: FAMILY MEDICINE

## 2023-05-21 PROCEDURE — 94761 N-INVAS EAR/PLS OXIMETRY MLT: CPT

## 2023-05-21 PROCEDURE — 82803 BLOOD GASES ANY COMBINATION: CPT

## 2023-05-21 PROCEDURE — 93005 ELECTROCARDIOGRAM TRACING: CPT

## 2023-05-21 PROCEDURE — 85027 COMPLETE CBC AUTOMATED: CPT | Performed by: FAMILY MEDICINE

## 2023-05-21 PROCEDURE — 86850 RBC ANTIBODY SCREEN: CPT | Performed by: INTERNAL MEDICINE

## 2023-05-21 PROCEDURE — 83605 ASSAY OF LACTIC ACID: CPT | Performed by: INTERNAL MEDICINE

## 2023-05-21 PROCEDURE — 27000221 HC OXYGEN, UP TO 24 HOURS

## 2023-05-21 PROCEDURE — 20000000 HC ICU ROOM

## 2023-05-21 PROCEDURE — 36415 COLL VENOUS BLD VENIPUNCTURE: CPT | Performed by: INTERNAL MEDICINE

## 2023-05-21 PROCEDURE — 99900026 HC AIRWAY MAINTENANCE (STAT)

## 2023-05-21 PROCEDURE — 80048 BASIC METABOLIC PNL TOTAL CA: CPT | Performed by: INTERNAL MEDICINE

## 2023-05-21 PROCEDURE — 85025 COMPLETE CBC W/AUTO DIFF WBC: CPT | Performed by: INTERNAL MEDICINE

## 2023-05-21 PROCEDURE — 93010 ELECTROCARDIOGRAM REPORT: CPT | Mod: ,,, | Performed by: INTERNAL MEDICINE

## 2023-05-21 PROCEDURE — 63600175 PHARM REV CODE 636 W HCPCS: Performed by: INTERNAL MEDICINE

## 2023-05-21 PROCEDURE — 80048 BASIC METABOLIC PNL TOTAL CA: CPT | Performed by: FAMILY MEDICINE

## 2023-05-21 PROCEDURE — 93010 EKG 12-LEAD: ICD-10-PCS | Mod: ,,, | Performed by: INTERNAL MEDICINE

## 2023-05-21 PROCEDURE — 94660 CPAP INITIATION&MGMT: CPT

## 2023-05-21 PROCEDURE — 83010 ASSAY OF HAPTOGLOBIN QUANT: CPT | Performed by: INTERNAL MEDICINE

## 2023-05-21 PROCEDURE — 27000190 HC CPAP FULL FACE MASK W/VALVE

## 2023-05-21 PROCEDURE — 36600 WITHDRAWAL OF ARTERIAL BLOOD: CPT | Performed by: INTERNAL MEDICINE

## 2023-05-21 PROCEDURE — 36600 WITHDRAWAL OF ARTERIAL BLOOD: CPT

## 2023-05-21 PROCEDURE — A4216 STERILE WATER/SALINE, 10 ML: HCPCS | Performed by: FAMILY MEDICINE

## 2023-05-21 PROCEDURE — 87077 CULTURE AEROBIC IDENTIFY: CPT | Performed by: INTERNAL MEDICINE

## 2023-05-21 PROCEDURE — 31500 INSERT EMERGENCY AIRWAY: CPT

## 2023-05-21 PROCEDURE — 63600175 PHARM REV CODE 636 W HCPCS

## 2023-05-21 PROCEDURE — 25000003 PHARM REV CODE 250: Performed by: SURGERY

## 2023-05-21 RX ORDER — MIDAZOLAM HYDROCHLORIDE 1 MG/ML
4 INJECTION INTRAMUSCULAR; INTRAVENOUS ONCE
Status: DISCONTINUED | OUTPATIENT
Start: 2023-05-21 | End: 2023-05-23

## 2023-05-21 RX ORDER — MIDAZOLAM HYDROCHLORIDE 1 MG/ML
2 INJECTION INTRAMUSCULAR; INTRAVENOUS
Status: DISCONTINUED | OUTPATIENT
Start: 2023-05-21 | End: 2023-05-23

## 2023-05-21 RX ORDER — FUROSEMIDE 10 MG/ML
40 INJECTION INTRAMUSCULAR; INTRAVENOUS
Status: DISCONTINUED | OUTPATIENT
Start: 2023-05-22 | End: 2023-05-21

## 2023-05-21 RX ORDER — FUROSEMIDE 10 MG/ML
40 INJECTION INTRAMUSCULAR; INTRAVENOUS ONCE
Status: COMPLETED | OUTPATIENT
Start: 2023-05-21 | End: 2023-05-21

## 2023-05-21 RX ORDER — PROPOFOL 10 MG/ML
INJECTION, EMULSION INTRAVENOUS
Status: COMPLETED
Start: 2023-05-21 | End: 2023-05-21

## 2023-05-21 RX ORDER — SUCCINYLCHOLINE CHLORIDE 20 MG/ML
100 INJECTION INTRAMUSCULAR; INTRAVENOUS ONCE
Status: COMPLETED | OUTPATIENT
Start: 2023-05-21 | End: 2023-05-21

## 2023-05-21 RX ORDER — MORPHINE SULFATE 2 MG/ML
INJECTION, SOLUTION INTRAMUSCULAR; INTRAVENOUS
Status: DISPENSED
Start: 2023-05-21 | End: 2023-05-21

## 2023-05-21 RX ORDER — MORPHINE SULFATE 2 MG/ML
2 INJECTION, SOLUTION INTRAMUSCULAR; INTRAVENOUS ONCE
Status: COMPLETED | OUTPATIENT
Start: 2023-05-21 | End: 2023-05-21

## 2023-05-21 RX ORDER — FENTANYL CITRATE 50 UG/ML
50 INJECTION, SOLUTION INTRAMUSCULAR; INTRAVENOUS
Status: DISCONTINUED | OUTPATIENT
Start: 2023-05-21 | End: 2023-05-25 | Stop reason: HOSPADM

## 2023-05-21 RX ORDER — HYDRALAZINE HYDROCHLORIDE 20 MG/ML
20 INJECTION INTRAMUSCULAR; INTRAVENOUS ONCE
Status: DISCONTINUED | OUTPATIENT
Start: 2023-05-21 | End: 2023-05-21

## 2023-05-21 RX ORDER — MIDAZOLAM HYDROCHLORIDE 1 MG/ML
INJECTION INTRAMUSCULAR; INTRAVENOUS
Status: COMPLETED
Start: 2023-05-21 | End: 2023-05-21

## 2023-05-21 RX ORDER — MIDAZOLAM HYDROCHLORIDE 1 MG/ML
4 INJECTION INTRAMUSCULAR; INTRAVENOUS ONCE
Status: COMPLETED | OUTPATIENT
Start: 2023-05-21 | End: 2023-05-21

## 2023-05-21 RX ORDER — SUCCINYLCHOLINE CHLORIDE 20 MG/ML
INJECTION INTRAMUSCULAR; INTRAVENOUS
Status: DISPENSED
Start: 2023-05-21 | End: 2023-05-21

## 2023-05-21 RX ORDER — FUROSEMIDE 10 MG/ML
20 INJECTION INTRAMUSCULAR; INTRAVENOUS EVERY 6 HOURS
Status: DISCONTINUED | OUTPATIENT
Start: 2023-05-21 | End: 2023-05-23

## 2023-05-21 RX ORDER — FENTANYL CITRATE 50 UG/ML
50 INJECTION, SOLUTION INTRAMUSCULAR; INTRAVENOUS
Status: ACTIVE | OUTPATIENT
Start: 2023-05-21 | End: 2023-05-21

## 2023-05-21 RX ORDER — INSULIN ASPART 100 [IU]/ML
0-5 INJECTION, SOLUTION INTRAVENOUS; SUBCUTANEOUS EVERY 6 HOURS PRN
Status: DISCONTINUED | OUTPATIENT
Start: 2023-05-21 | End: 2023-05-25 | Stop reason: HOSPADM

## 2023-05-21 RX ORDER — PROPOFOL 10 MG/ML
0-50 INJECTION, EMULSION INTRAVENOUS CONTINUOUS
Status: DISCONTINUED | OUTPATIENT
Start: 2023-05-21 | End: 2023-05-23

## 2023-05-21 RX ORDER — CHLORHEXIDINE GLUCONATE ORAL RINSE 1.2 MG/ML
15 SOLUTION DENTAL 2 TIMES DAILY
Status: DISCONTINUED | OUTPATIENT
Start: 2023-05-21 | End: 2023-05-23

## 2023-05-21 RX ORDER — GLUCAGON 1 MG
1 KIT INJECTION
Status: DISCONTINUED | OUTPATIENT
Start: 2023-05-21 | End: 2023-05-25 | Stop reason: HOSPADM

## 2023-05-21 RX ADMIN — MIDAZOLAM HYDROCHLORIDE 2 MG: 1 INJECTION, SOLUTION INTRAMUSCULAR; INTRAVENOUS at 10:05

## 2023-05-21 RX ADMIN — MIDAZOLAM HYDROCHLORIDE 2 MG: 1 INJECTION, SOLUTION INTRAMUSCULAR; INTRAVENOUS at 08:05

## 2023-05-21 RX ADMIN — MUPIROCIN 1 G: 20 OINTMENT TOPICAL at 08:05

## 2023-05-21 RX ADMIN — WHITE PETROLATUM 57.7 %-MINERAL OIL 31.9 % EYE OINTMENT: at 08:05

## 2023-05-21 RX ADMIN — MIDAZOLAM HYDROCHLORIDE 4 MG: 1 INJECTION, SOLUTION INTRAMUSCULAR; INTRAVENOUS at 12:05

## 2023-05-21 RX ADMIN — PROPOFOL 10 MCG/KG/MIN: 10 INJECTION, EMULSION INTRAVENOUS at 03:05

## 2023-05-21 RX ADMIN — SODIUM CHLORIDE, PRESERVATIVE FREE 10 ML: 5 INJECTION INTRAVENOUS at 12:05

## 2023-05-21 RX ADMIN — FUROSEMIDE 20 MG: 10 INJECTION, SOLUTION INTRAVENOUS at 11:05

## 2023-05-21 RX ADMIN — MELATONIN TAB 3 MG 6 MG: 3 TAB at 08:05

## 2023-05-21 RX ADMIN — PROPOFOL 50 MCG/KG/MIN: 10 INJECTION, EMULSION INTRAVENOUS at 03:05

## 2023-05-21 RX ADMIN — SODIUM CHLORIDE, PRESERVATIVE FREE 10 ML: 5 INJECTION INTRAVENOUS at 06:05

## 2023-05-21 RX ADMIN — CHLORHEXIDINE GLUCONATE 15 ML: 1.2 RINSE ORAL at 08:05

## 2023-05-21 RX ADMIN — PIPERACILLIN AND TAZOBACTAM 4.5 G: 4; .5 INJECTION, POWDER, FOR SOLUTION INTRAVENOUS; PARENTERAL at 03:05

## 2023-05-21 RX ADMIN — FUROSEMIDE 20 MG: 10 INJECTION, SOLUTION INTRAVENOUS at 05:05

## 2023-05-21 RX ADMIN — PROPOFOL 45 MCG/KG/MIN: 10 INJECTION, EMULSION INTRAVENOUS at 08:05

## 2023-05-21 RX ADMIN — PROPOFOL 10 MCG/KG/MIN: 10 INJECTION, EMULSION INTRAVENOUS at 02:05

## 2023-05-21 RX ADMIN — SODIUM CHLORIDE, PRESERVATIVE FREE 10 ML: 5 INJECTION INTRAVENOUS at 11:05

## 2023-05-21 RX ADMIN — PROPOFOL 50 MCG/KG/MIN: 10 INJECTION, EMULSION INTRAVENOUS at 08:05

## 2023-05-21 RX ADMIN — FUROSEMIDE 40 MG: 10 INJECTION, SOLUTION INTRAMUSCULAR; INTRAVENOUS at 12:05

## 2023-05-21 RX ADMIN — FUROSEMIDE 20 MG: 10 INJECTION, SOLUTION INTRAVENOUS at 06:05

## 2023-05-21 RX ADMIN — SUCCINYLCHOLINE CHLORIDE 100 MG: 20 INJECTION, SOLUTION INTRAMUSCULAR; INTRAVENOUS at 12:05

## 2023-05-21 RX ADMIN — PIPERACILLIN AND TAZOBACTAM 4.5 G: 4; .5 INJECTION, POWDER, FOR SOLUTION INTRAVENOUS; PARENTERAL at 11:05

## 2023-05-21 RX ADMIN — PROPOFOL 50 MCG/KG/MIN: 10 INJECTION, EMULSION INTRAVENOUS at 06:05

## 2023-05-21 RX ADMIN — PROPOFOL 50 MCG/KG/MIN: 10 INJECTION, EMULSION INTRAVENOUS at 11:05

## 2023-05-21 RX ADMIN — PIPERACILLIN AND TAZOBACTAM 4.5 G: 4; .5 INJECTION, POWDER, FOR SOLUTION INTRAVENOUS; PARENTERAL at 08:05

## 2023-05-21 RX ADMIN — SODIUM CHLORIDE, PRESERVATIVE FREE 10 ML: 5 INJECTION INTRAVENOUS at 05:05

## 2023-05-21 RX ADMIN — MIDAZOLAM HYDROCHLORIDE 2 MG: 1 INJECTION, SOLUTION INTRAMUSCULAR; INTRAVENOUS at 12:05

## 2023-05-21 RX ADMIN — MORPHINE SULFATE 2 MG: 2 INJECTION, SOLUTION INTRAMUSCULAR; INTRAVENOUS at 12:05

## 2023-05-21 RX ADMIN — PIPERACILLIN AND TAZOBACTAM 4.5 G: 4; .5 INJECTION, POWDER, FOR SOLUTION INTRAVENOUS; PARENTERAL at 12:05

## 2023-05-21 RX ADMIN — MIDAZOLAM HYDROCHLORIDE 4 MG: 1 INJECTION INTRAMUSCULAR; INTRAVENOUS at 12:05
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
## 2023-05-21 NOTE — PLAN OF CARE
Problem: Adult Inpatient Plan of Care  Goal: Plan of Care Review  Outcome: Ongoing, Progressing  Goal: Patient-Specific Goal (Individualized)  Outcome: Ongoing, Progressing  Goal: Absence of Hospital-Acquired Illness or Injury  Outcome: Ongoing, Progressing  Goal: Optimal Comfort and Wellbeing  Outcome: Ongoing, Progressing  Goal: Readiness for Transition of Care  Outcome: Ongoing, Progressing     Problem: Impaired Wound Healing  Goal: Optimal Wound Healing  Outcome: Ongoing, Progressing     Problem: Fall Injury Risk  Goal: Absence of Fall and Fall-Related Injury  Outcome: Ongoing, Progressing     Problem: Pain Acute  Goal: Acceptable Pain Control and Functional Ability  Outcome: Ongoing, Progressing     Problem: Infection  Goal: Absence of Infection Signs and Symptoms  Outcome: Ongoing, Progressing     Problem: Diabetes Comorbidity  Goal: Blood Glucose Level Within Targeted Range  Outcome: Ongoing, Progressing     Problem: Skin Injury Risk Increased  Goal: Skin Health and Integrity  Outcome: Ongoing, Progressing     Problem: Restraint, Nonbehavioral (Nonviolent)  Goal: Absence of Harm or Injury  Outcome: Ongoing, Progressing

## 2023-05-21 NOTE — NURSING
Message sent to Dr. Dos Santos that HLA Class II low res DNA typing not available.  Awaiting to see if she wants something else drawn instead.

## 2023-05-21 NOTE — PROVIDER PROGRESS NOTES - EMERGENCY DEPT.
Encounter Date: 5/16/2023    ED Physician Progress Notes        Called for rapid response in room 205. Patient was found unresponsive, on BiPAP. It was felt he was suffering from flash pulmonary edema.  His pCO2 equals 117.  The tele nocturnist was at the bedside.    Patient was given Versed 4 mg IV, followed by succinylcholine 100 mg IV.  A bag-valve mask was used for pre oxygenation.  He was then endotracheally intubated on the 1st attempt, with a 7.5 Iraqi ETT, using the hand-held video laryngoscope..  Placement was confirmed with an end-tidal CO2 detector, auscultation.  A portable chest x-ray was ordered.  There were no complications.  There was no aspiration.    At this point, the nocturnist took over, ordering ventilator parameters, and transferring the patient to the ICU.

## 2023-05-21 NOTE — CARE UPDATE
05/21/23 0713   Patient Assessment/Suction   Level of Consciousness (AVPU) responds to voice   Respiratory Effort Normal;Unlabored   Expansion/Accessory Muscles/Retractions no use of accessory muscles   All Lung Fields Breath Sounds coarse   Rhythm/Pattern, Respiratory assisted mechanically   Cough Frequency with stimulation   Cough Type assisted   Suction Method tracheal   $ Suction Charges Inline Suction Procedure Stat Charge   Secretions Amount other (see comments)  (no return)   Skin Integrity   $ Wound Care Tech Time 15 min   Area Observed Cheek;Upper lip   Skin Appearance without discoloration   PRE-TX-O2   Device (Oxygen Therapy) ventilator   Oxygen Concentration (%) 40   SpO2 100 %   Pulse Oximetry Type Continuous   Pulse 74   Temp 96.3 °F (35.7 °C)   Positioning HOB elevated 30 degrees   ETCO2   $ ETCO2 Usage Currently wearing   ETCO2 (mmHg) 42 mmHg   ETCO2 Device Type Bedside Monitor;Ventilator        Airway - Non-Surgical 05/21/23 0033 Endotracheal Tube   Placement Date/Time: 05/21/23 0033   Present Prior to Hospital Arrival?: No  Method of Intubation: Glidescope  Inserted by: MD  Staff/Resident Name(s): DR. VARGAS  Airway Device: Endotracheal Tube  Intubated: (c) Other (see comments)  Airway Device Si...   Secured at 25 cm   Measured At Lips   Secured Location Center   Secured by Commercial tube [place]   Site Condition Cool;Dry   Status Intact;Secured;Patent   Site Assessment Clean;Dry   Cuff Volume 24 mL   Airway Safety   Is Ambu Bag and Mask with Patient? Yes, Adult Ambu Bag and Mask   Airway Assistance   $ Tube Exchange Charges Tech time 15 min   Vent Select   Conventional Vent Y   Charged w/in last 24h YES   Preset Conventional Ventilator Settings   Vent Type    Ventilation Type VC   Vent Mode A/C   Humidity HME   Set Rate 16 BPM   Vt Set 500 mL   PEEP/CPAP 10 cmH20   Waveform RAMP   Peak Flow 70 L/min   Plateau Set/Insp. Hold (sec) 0   I-Trigger Type  V-TRIG   Trigger Sensitivity  Flow/I-Trigger 1 L/min   Patient Ventilator Parameters   Resp Rate Total 16 br/min   Peak Airway Pressure 28 cmH20   Mean Airway Pressure 14 cmH20   Plateau Pressure 0 cmH20   Exhaled Vt 517 mL   Total Ve 8.28 L/m   I:E Ratio Measured 1:3.80   Auto PEEP 0 cmH20   Conventional Ventilator Alarms   Alarms On Y   Ve High Alarm 22 L/min   Ve Low Alarm 2.2 L/min   Vt High Alarm 660 mL   Vt Low Alarm 225 mL   Resp Rate High Alarm 30 br/min   Press High Alarm 50 cmH2O   Apnea Rate 10   Apnea Volume (mL) 325 mL   Apnea Oxygen Concentration  100   Apnea Flow Rate (L/min) 47   T Apnea 20 sec(s)   Ready to Wean/Extubation Screen   FIO2<=50 (chart decimal) 0.4   MV<16L (chart vol.) 8.28   PEEP <=8 (chart #) (!) 10   Negative Inspiratory Force   Negative Inspiratory Force (cm H2O) 0   Vital Capacity   Vital Capacity (mL) 0   Pt received on vent with settings as charted. Pt resting well at this time.

## 2023-05-21 NOTE — NURSING
Message sent by Dr. Dos Santos to complete blood reaction and notify lab. Spoke with Gaetano in the lab and he will start the process for a transfusion reaction.  Urine will be sent per protocol.
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
## 2023-05-21 NOTE — SUBJECTIVE & OBJECTIVE
Interval History:      Review of Systems   Unable to perform ROS: Intubated   Objective:     Vital Signs (Most Recent):  Temp: 96.6 °F (35.9 °C) (05/21/23 0853)  Pulse: 72 (05/21/23 0853)  Resp: 16 (05/21/23 0553)  BP: 96/63 (05/21/23 0630)  SpO2: 99 % (05/21/23 0853) Vital Signs (24h Range):  Temp:  [95.9 °F (35.5 °C)-98.6 °F (37 °C)] 96.6 °F (35.9 °C)  Pulse:  [] 72  Resp:  [12-38] 16  SpO2:  [79 %-100 %] 99 %  BP: ()/() 96/63     Weight: 95.4 kg (210 lb 5.1 oz)  Body mass index is 30.18 kg/m².    Intake/Output Summary (Last 24 hours) at 5/21/2023 1010  Last data filed at 5/21/2023 0835  Gross per 24 hour   Intake 110 ml   Output 3940 ml   Net -3830 ml         Physical Exam  Constitutional:       General: He is not in acute distress.     Appearance: He is obese. He is not ill-appearing.      Comments: Intubated and sedated   Cardiovascular:      Rate and Rhythm: Normal rate and regular rhythm.      Pulses: Normal pulses.      Heart sounds: Normal heart sounds.   Pulmonary:      Effort: Pulmonary effort is normal. No respiratory distress.      Breath sounds: Wheezing and rales present.   Abdominal:      General: Abdomen is flat. There is no distension.      Palpations: Abdomen is soft.      Tenderness: There is no abdominal tenderness.   Skin:     General: Skin is warm and dry.      Findings: No erythema or rash.   Neurological:      Comments: Intubated and sedated   Psychiatric:      Comments: Intubated and sedated           Significant Labs: All pertinent labs within the past 24 hours have been reviewed.  BMP:   Recent Labs   Lab 05/21/23  0422      K 4.1   CO2 32   BUN 16.0   CREATININE 1.01   CALCIUM 8.2*     CBC:   Recent Labs   Lab 05/20/23  0411 05/21/23  0043 05/21/23  0422   WBC 9.48 14.87* 15.23*   HGB 7.4* 10.5* 8.6*   HCT 23.5* 33.4* 26.9*   * 538* 420*       Significant Imaging: I have reviewed all pertinent imaging results/findings within the past 24 hours.
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
## 2023-05-21 NOTE — NURSING
SUMMONED TO ROOM. PT ON BEDPAN AND VOIDING IN URINAL. REPORTS SOB. NOTED TO BE DIAPHORETIC, TAC HYPNEIC, AND TACHYCARDIC. CRACKLES NOTED THROUGHOUT LUNG HEMPHILL. PLACED ON 02 AT 4L PER [place]. . JOE22-51%. SEE V/S.

## 2023-05-22 PROBLEM — J96.02 ACUTE RESPIRATORY FAILURE WITH HYPOXIA AND HYPERCARBIA: Status: ACTIVE | Noted: 2023-05-22

## 2023-05-22 PROBLEM — J96.01 ACUTE RESPIRATORY FAILURE WITH HYPOXIA AND HYPERCARBIA: Status: ACTIVE | Noted: 2023-05-22

## 2023-05-22 LAB
ANION GAP SERPL CALC-SCNC: 3 MEQ/L (ref 2–13)
BACTERIA SPEC ANAEROBE CULT: ABNORMAL
BASE EXCESS BLD CALC-SCNC: 12.5 MMOL/L (ref -2–2)
BLOOD GAS SAMPLE TYPE (OHS): ABNORMAL
BUN SERPL-MCNC: 19 MG/DL (ref 7–20)
CALCIUM SERPL-MCNC: 8.7 MG/DL (ref 8.4–10.2)
CHLORIDE SERPL-SCNC: 103 MMOL/L (ref 98–110)
CO2 SERPL-SCNC: 35 MMOL/L (ref 21–32)
COHGB MFR BLDA: 1.3 % (ref 0–1.5)
CREAT SERPL-MCNC: 1.2 MG/DL (ref 0.66–1.25)
CREAT/UREA NIT SERPL: 16 (ref 12–20)
ERYTHROCYTE [DISTWIDTH] IN BLOOD BY AUTOMATED COUNT: 19.5 %
FIO2 BLOOD GAS (OHS): 30 %
GFR SERPLBLD CREATININE-BSD FMLA CKD-EPI: 71 MLS/MIN/1.73/M2
GLUCOSE SERPL-MCNC: 103 MG/DL (ref 70–115)
HCO3 BLDA-SCNC: 36.4 MMOL/L (ref 22–26)
HCT VFR BLD AUTO: 27.8 % (ref 36–52)
HGB BLD-MCNC: 8.7 G/DL (ref 13–18)
IP (OHS): 0 CMH2O
MCH RBC QN AUTO: 26 PG (ref 27–34)
MCHC RBC AUTO-ENTMCNC: 31.3 G/DL (ref 31–37)
MCV RBC AUTO: 83 FL (ref 79–99)
MECH RR (OHS): 16 B/MIN
MECH VT (OHS): 500 ML
METHGB MFR BLDA: 0.2 % (ref 0–1.5)
MODE (OHS): AC
NRBC BLD AUTO-RTO: 0 %
PCO2 BLDA: 40.9 MMHG (ref 35–45)
PEEP (OHS): 10 CMH2O
PH BLDA: 7.55 [PH] (ref 7.35–7.45)
PIP (OHS): 0 CMH20
PLATELET # BLD AUTO: 430 X10(3)/MCL (ref 140–371)
PMV BLD AUTO: ABNORMAL FL
PO2 BLDA: 140 MMHG (ref 80–105)
POCT GLUCOSE: 113 MG/DL (ref 70–110)
POCT GLUCOSE: 117 MG/DL (ref 70–110)
POCT GLUCOSE: 85 MG/DL (ref 70–110)
POCT GLUCOSE: 98 MG/DL (ref 70–110)
POTASSIUM SERPL-SCNC: 3.3 MMOL/L (ref 3.5–5.1)
RBC # BLD AUTO: 3.35 X10(6)/MCL (ref 4–6)
SAO2 % BLDA: 99.9 % (ref 95–100)
SODIUM SERPL-SCNC: 141 MMOL/L (ref 135–145)
WBC # SPEC AUTO: 8.39 X10(3)/MCL (ref 4–11.5)

## 2023-05-22 PROCEDURE — 25000003 PHARM REV CODE 250: Performed by: FAMILY MEDICINE

## 2023-05-22 PROCEDURE — 63600175 PHARM REV CODE 636 W HCPCS: Performed by: FAMILY MEDICINE

## 2023-05-22 PROCEDURE — 27000221 HC OXYGEN, UP TO 24 HOURS

## 2023-05-22 PROCEDURE — A4216 STERILE WATER/SALINE, 10 ML: HCPCS | Performed by: FAMILY MEDICINE

## 2023-05-22 PROCEDURE — 80048 BASIC METABOLIC PNL TOTAL CA: CPT | Performed by: FAMILY MEDICINE

## 2023-05-22 PROCEDURE — 36600 WITHDRAWAL OF ARTERIAL BLOOD: CPT

## 2023-05-22 PROCEDURE — 20000000 HC ICU ROOM

## 2023-05-22 PROCEDURE — 99900035 HC TECH TIME PER 15 MIN (STAT)

## 2023-05-22 PROCEDURE — 36415 COLL VENOUS BLD VENIPUNCTURE: CPT | Performed by: FAMILY MEDICINE

## 2023-05-22 PROCEDURE — 82803 BLOOD GASES ANY COMBINATION: CPT

## 2023-05-22 PROCEDURE — 94761 N-INVAS EAR/PLS OXIMETRY MLT: CPT

## 2023-05-22 PROCEDURE — 63600175 PHARM REV CODE 636 W HCPCS: Performed by: INTERNAL MEDICINE

## 2023-05-22 PROCEDURE — 25000003 PHARM REV CODE 250: Performed by: INTERNAL MEDICINE

## 2023-05-22 PROCEDURE — 85027 COMPLETE CBC AUTOMATED: CPT | Performed by: FAMILY MEDICINE

## 2023-05-22 PROCEDURE — 99900026 HC AIRWAY MAINTENANCE (STAT)

## 2023-05-22 PROCEDURE — 94003 VENT MGMT INPAT SUBQ DAY: CPT

## 2023-05-22 RX ORDER — POTASSIUM CHLORIDE 7.45 MG/ML
10 INJECTION INTRAVENOUS
Status: COMPLETED | OUTPATIENT
Start: 2023-05-22 | End: 2023-05-22

## 2023-05-22 RX ADMIN — PROPOFOL 50 MCG/KG/MIN: 10 INJECTION, EMULSION INTRAVENOUS at 02:05

## 2023-05-22 RX ADMIN — PROPOFOL 50 MCG/KG/MIN: 10 INJECTION, EMULSION INTRAVENOUS at 10:05

## 2023-05-22 RX ADMIN — PIPERACILLIN AND TAZOBACTAM 4.5 G: 4; .5 INJECTION, POWDER, FOR SOLUTION INTRAVENOUS; PARENTERAL at 04:05

## 2023-05-22 RX ADMIN — CHLORHEXIDINE GLUCONATE 15 ML: 1.2 RINSE ORAL at 09:05

## 2023-05-22 RX ADMIN — SODIUM CHLORIDE, PRESERVATIVE FREE 10 ML: 5 INJECTION INTRAVENOUS at 05:05

## 2023-05-22 RX ADMIN — PROPOFOL 50 MCG/KG/MIN: 10 INJECTION, EMULSION INTRAVENOUS at 06:05

## 2023-05-22 RX ADMIN — PROPOFOL 50 MCG/KG/MIN: 10 INJECTION, EMULSION INTRAVENOUS at 12:05

## 2023-05-22 RX ADMIN — FUROSEMIDE 20 MG: 10 INJECTION, SOLUTION INTRAVENOUS at 05:05

## 2023-05-22 RX ADMIN — CHLORHEXIDINE GLUCONATE 15 ML: 1.2 RINSE ORAL at 11:05

## 2023-05-22 RX ADMIN — POTASSIUM CHLORIDE 10 MEQ: 7.46 INJECTION, SOLUTION INTRAVENOUS at 12:05

## 2023-05-22 RX ADMIN — WHITE PETROLATUM 57.7 %-MINERAL OIL 31.9 % EYE OINTMENT: at 09:05

## 2023-05-22 RX ADMIN — WHITE PETROLATUM 57.7 %-MINERAL OIL 31.9 % EYE OINTMENT: at 11:05

## 2023-05-22 RX ADMIN — SODIUM CHLORIDE, PRESERVATIVE FREE 10 ML: 5 INJECTION INTRAVENOUS at 12:05

## 2023-05-22 RX ADMIN — PROPOFOL 50 MCG/KG/MIN: 10 INJECTION, EMULSION INTRAVENOUS at 08:05

## 2023-05-22 RX ADMIN — POTASSIUM CHLORIDE 10 MEQ: 7.46 INJECTION, SOLUTION INTRAVENOUS at 04:05

## 2023-05-22 RX ADMIN — COLLAGENASE SANTYL: 250 OINTMENT TOPICAL at 10:05

## 2023-05-22 RX ADMIN — FUROSEMIDE 20 MG: 10 INJECTION, SOLUTION INTRAVENOUS at 12:05

## 2023-05-22 RX ADMIN — MIDAZOLAM HYDROCHLORIDE 2 MG: 1 INJECTION, SOLUTION INTRAMUSCULAR; INTRAVENOUS at 12:05

## 2023-05-22 RX ADMIN — PROPOFOL 50 MCG/KG/MIN: 10 INJECTION, EMULSION INTRAVENOUS at 04:05

## 2023-05-22 RX ADMIN — MUPIROCIN 1 G: 20 OINTMENT TOPICAL at 11:05

## 2023-05-22 RX ADMIN — PROPOFOL 50 MCG/KG/MIN: 10 INJECTION, EMULSION INTRAVENOUS at 05:05

## 2023-05-22 RX ADMIN — PIPERACILLIN AND TAZOBACTAM 4.5 G: 4; .5 INJECTION, POWDER, FOR SOLUTION INTRAVENOUS; PARENTERAL at 08:05

## 2023-05-22 RX ADMIN — POTASSIUM CHLORIDE 10 MEQ: 7.46 INJECTION, SOLUTION INTRAVENOUS at 05:05

## 2023-05-22 RX ADMIN — POTASSIUM CHLORIDE 10 MEQ: 7.46 INJECTION, SOLUTION INTRAVENOUS at 02:05

## 2023-05-22 NOTE — PROGRESS NOTES
[facility] Medicine  Progress Note    Patient Name: Honorio Robb  MRN: [id number]  Patient Class: IP- Inpatient   Admission Date: 5/16/2023  Length of Stay: 6 days  Attending Physician: Melita Hummel MD  Primary Care Provider: No primary care provider on file.        Subjective:     Principal Problem:Gangrene        HPI:  Pt is a 57 y.o. male with hx of DM, PAD hx of Left BKA, now with R foot infection that has been chronic and managed as outpatient after sustaining a burn to his R foot.  He went to wound care today and noted purulent drainage from the foot and was sent for surgical evaluation.  Patient reports issues with swelling and purulent drainage for a while now.  Once in ER spiked a fever and noted to have Tachycardia.  He has some NV as well with concern over aspiration.  He is now on 2LNC and resting comfortably.  HR initially went up to 140-150 but now down in 120s.           Overview/Hospital Course:     05/17/2023 no c/o   Pain well controlled   Npo for debridement with dr walton   05/18/2023 pt s/p debridement of foot, + for osteomyelitis and will need 6 weeks of iv abx, Case management working on LTAC placement.     Today: Patient seen and examined at bedside, and chart reviewed.     05/19/2023 case management working on LTAC for iv abx for osteomyelitis.s/p 2U of PRBCs on 05/18/2023 H&H stable this am.  Blood loss likely due to surgery/debridement earlier this week.    05/20/2023 pt without c/o today, Hgb dropped to 7 will give his 3rd unit on hold.  No further bleeding from wound.  Waitingon LTAC approval for 6 weeks of iv abx  05/21/2023 pt was stable seen by surgery around 945 pm, got SOB and diaphoretic around  11:45 pm was seen by telemed, BP elevated and CXR showed worsening pulmonary edema after receiving 1 U of PRBC yesterday evening.  He was ordered lasix and placed on 4L.   Around 12:30 am rapid response was called and pt was intubated by ERMD suspected  flash pulmonary edema possible transfusion reaction and elevated /100.  Pt did have elevated BP earlier in the week when he received 2 U of blood and 3rd unit was held at that time and his BP came down.    ABG at time of intubation pt PCO2 was 117  Pt has grown e coli, proteus, morganella all in blood, wound, and tissues from surgery all sensitive to Zosyn.  He is on zosyn day #6 today.  Will likely needs at least 6 weeks of iv abx.  Plan was to send to LTAC due to osteomyelitis and need for long term iv abx.  BP this am is stable and ABG shows improvement.  He is sedated and resting on the vent, arousable with stimulation. Echo done this admit EF 50% with grade 2 diastolic dysfunction.  BNP is elevated. He has been followed by [facility]  05/22/2023 pt stable, weaning started with RT, down to 30%, switched to IMV mode this am due to PCO2 low.      Interval History:      Review of Systems   Unable to perform ROS: Intubated   Objective:     Vital Signs (Most Recent):  Temp: 98.1 °F (36.7 °C) (05/22/23 0746)  Pulse: 75 (05/22/23 0746)  Resp: 16 (05/22/23 0746)  BP: 116/65 (05/22/23 0500)  SpO2: 100 % (05/22/23 0746) Vital Signs (24h Range):  Temp:  [96.6 °F (35.9 °C)-98.1 °F (36.7 °C)] 98.1 °F (36.7 °C)  Pulse:  [65-75] 75  Resp:  [16-17] 16  SpO2:  [96 %-100 %] 100 %  BP: (101-153)/(61-87) 116/65     Weight: 89.7 kg (197 lb 12.8 oz)  Body mass index is 28.38 kg/m².    Intake/Output Summary (Last 24 hours) at 5/22/2023 1005  Last data filed at 5/22/2023 0601  Gross per 24 hour   Intake 2884.22 ml   Output 5900 ml   Net -3015.78 ml           Physical Exam  Constitutional:       General: He is not in acute distress.     Appearance: He is obese. He is not ill-appearing.      Comments: Intubated and sedated   Cardiovascular:      Rate and Rhythm: Normal rate and regular rhythm.      Pulses: Normal pulses.      Heart sounds: Normal heart sounds.   Pulmonary:      Effort: Pulmonary effort is normal. No respiratory distress.       Breath sounds: No wheezing or rales.   Abdominal:      General: Abdomen is flat. There is no distension.      Palpations: Abdomen is soft.      Tenderness: There is no abdominal tenderness.   Skin:     General: Skin is warm and dry.      Findings: No erythema or rash.   Neurological:      Comments: Intubated and sedated   Psychiatric:      Comments: Intubated and sedated           Significant Labs: All pertinent labs within the past 24 hours have been reviewed.  BMP:   Recent Labs   Lab 05/22/23  0430      K 3.3*   CO2 35*   BUN 19.0   CREATININE 1.20   CALCIUM 8.7       CBC:   Recent Labs   Lab 05/21/23  0043 05/21/23  0422 05/22/23  0430   WBC 14.87* 15.23* 8.39   HGB 10.5* 8.6* 8.7*   HCT 33.4* 26.9* 27.8*   * 420* 430*         Significant Imaging: I have reviewed all pertinent imaging results/findings within the past 24 hours.      Assessment/Plan:      Acute respiratory failure with hypoxia and hypercarbia  Patient with Hypercapnic and Hypoxic Respiratory failure which is Acute on chronic.  he is not on home oxygen. Supplemental oxygen was provided and noted- Vent Mode: A/C  Oxygen Concentration (%):  [30-35] 30  Resp Rate Total:  [16 br/min-18 br/min] 16 br/min  Vt Set:  [500 mL] 500 mL  PEEP/CPAP:  [10 cmH20] 10 cmH20  Mean Airway Pressure:  [14 cmH20] 14 cmH20    .   Signs/symptoms of respiratory failure include- use of accessory muscles and lethargy. Contributing diagnoses includes - COPD Labs and images were reviewed. Patient Has recent ABG, which has been reviewed. Will treat underlying causes and adjust management of respiratory failure as follows-      Continue iv abx  cxr looks clear  Wean vent today  Hope to extubate next 24 hrs    Osteomyelitis    Right foot osteomyelitis  Will need 6 weeks of iv abx per surgery  Case management working on LTAC    Acute blood loss anemia  S/p 2 U PRBCS  Will give an additional 1 U Today  Check iron studies      Diabetic ulcer of midfoot associated  with diabetes mellitus due to underlying condition, with fat layer exposed  Patient's FSGs are controlled on current medication regimen.  Last A1c reviewed-   Lab Results   Component Value Date    HGBA1C 5.5 05/17/2023     Most recent fingerstick glucose reviewed-   Recent Labs   Lab 05/19/23  1619 05/19/23  2104 05/20/23  0713 05/20/23  1049   POCTGLUCOSE 156* 173* 216* 142*     Current correctional scale  Low  Maintain anti-hyperglycemic dose as follows-   Antihyperglycemics (From admission, onward)    Start     Stop Route Frequency Ordered    05/17/23 1020  insulin aspart U-100 pen 1-10 Units         -- SubQ Before meals & nightly PRN 05/17/23 0920        Continue iv abx  Start metformin 500mg bid      VTE Risk Mitigation (From admission, onward)         Ordered     IP VTE LOW RISK PATIENT  Once         05/16/23 2223                Discharge Planning   MIRYAM: 5/23/2023     Code Status: Full Code   Is the patient medically ready for discharge?:     Reason for patient still in hospital (select all that apply): Treatment and Consult recommendations  Discharge Plan A: Long-term acute care facility (LTAC)      Critical care time spent on the evaluation and treatment of severe organ dysfunction, review of pertinent labs and imaging studies, discussions with consulting providers and discussions with patient/family 36  minutes                Marcella Reyes MD  Department of Hospital Medicine   [facility]

## 2023-05-22 NOTE — PROGRESS NOTES
[facility]    Patient Name:  Honorio Robb   MRN:  [id number]  Date: 5/22/2023  Diagnosis: Gangrene    History:     Past Medical History:   Diagnosis Date    Acute osteomyelitis, multiple sites     left lower extremity    Diabetes mellitus, type 2     Gas gangrene of extremity     left lower extremity    HLD (hyperlipidemia)     Wet gangrene     right lower extremity       Social History     Socioeconomic History    Marital status: Unknown   Tobacco Use    Smoking status: Never    Smokeless tobacco: Never   Substance and Sexual Activity    Alcohol use: Never    Drug use: Never     Social Determinants of Health     Financial Resource Strain: Low Risk     Difficulty of Paying Living Expenses: Not hard at all   Food Insecurity: No Food Insecurity    Worried About Running Out of Food in the Last Year: Never true    Ran Out of Food in the Last Year: Never true   Transportation Needs: No Transportation Needs    Lack of Transportation (Medical): No    Lack of Transportation (Non-Medical): No   Physical Activity: Inactive    Days of Exercise per Week: 0 days    Minutes of Exercise per Session: 0 min   Stress: No Stress Concern Present    Feeling of Stress : Not at all   Social Connections: Moderately Isolated    Frequency of Communication with Friends and Family: More than three times a week    Frequency of Social Gatherings with Friends and Family: More than three times a week    Attends Yazdanism Services: More than 4 times per year    Active Member of Clubs or Organizations: No    Attends Club or Organization Meetings: Never    Marital Status: Never    Housing Stability: Low Risk     Unable to Pay for Housing in the Last Year: No    Number of Places Lived in the Last Year: 2    Unstable Housing in the Last Year: No       Precautions:     Allergies as of 05/16/2023    (No Known Allergies)       WOC Assessment Details/Treatment        05/22/23 1032   WOCN Assessment   WOCN Total Time  (mins) 45   Visit Date 05/22/23   Visit Time 0945   Consult Type Follow Up   WOCN Speciality Wound   Number of Wounds 3   Intervention assessed;changed;applied   Teaching on-going   Skin Interventions   Pressure Reduction Devices heel offloading device utilized;pressure-redistributing mattress utilized   Pressure Reduction Techniques heels elevated off bed;weight shift assistance provided   Positioning   Positioning/Transfer Devices wedge;pillows;in use   Pressure Injury Prevention    Heel protection technique Pillow        Altered Skin Integrity 05/16/23 2300 Left anterior;lower Leg Full thickness tissue loss. Base is covered by slough and/or eschar in the wound bed   Date First Assessed/Time First Assessed: 05/16/23 2300   Altered Skin Integrity Present on Admission - Did Patient arrive to the hospital with altered skin?: yes  Side: Left  Orientation: anterior;lower  Location: Leg  Description of Altered Skin Inte...   Wound Image     Description of Altered Skin Integrity Full thickness tissue loss. Base is covered by slough and/or eschar in the wound bed   Dressing Appearance Dry;Intact;Clean   Drainage Amount None   Drainage Characteristics/Odor No odor   Appearance Tan;Slough;Moist   Yellow (%), Wound Tissue Color 100 %   Periwound Area Intact   Wound Edges Defined   Wound Length (cm) 2.4 cm   Wound Width (cm) 2 cm   Wound Depth (cm) 0.1 cm   Wound Volume (cm^3) 0.48 cm^3   Wound Surface Area (cm^2) 4.8 cm^2   Care Sterile normal saline   Dressing Applied  (santyl then covered with medipore dressing)   Dressing Change Due 05/23/23        Altered Skin Integrity 05/16/23 1335 Right lateral Foot Non pressure chronic ulcer   Date First Assessed/Time First Assessed: 05/16/23 1335   Altered Skin Integrity Present on Admission - Did Patient arrive to the hospital with altered skin?: yes  Side: Right  Orientation: lateral  Location: Foot  Primary Wound Type: Non pressure ...   Wound Image     Dressing Appearance  Dry;Intact;Clean   Drainage Amount Small   Drainage Characteristics/Odor Serosanguineous;Malodorous   Appearance Red;Moist;Granulating   Tissue loss description Full thickness   Red (%), Wound Tissue Color 100 %   Periwound Area Edematous   Wound Edges Defined   Wound Length (cm) 7.5 cm   Wound Width (cm) 5.5 cm   Wound Depth (cm) 0.5 cm   Wound Volume (cm^3) 20.625 cm^3   Wound Surface Area (cm^2) 41.25 cm^2   Care Cleansed with:;Sterile normal saline   Dressing Applied;Gauze, wet to dry;Absorptive Pad;Rolled gauze;Elastic bandage   Dressing Change Due 05/22/23  (pm shift)        Altered Skin Integrity 05/16/23 1600 Right plantar Foot Non pressure chronic ulcer   Date First Assessed/Time First Assessed: 05/16/23 1600   Altered Skin Integrity Present on Admission - Did Patient arrive to the hospital with altered skin?: yes  Side: Right  Orientation: plantar  Location: Foot  Primary Wound Type: Non pressure ...   Wound Image       Dressing Appearance Intact;Dry;Clean   Drainage Amount Moderate   Drainage Characteristics/Odor Sanguineous;Malodorous   Appearance Red;Yellow;Gray;Moist   Tissue loss description Full thickness   Red (%), Wound Tissue Color 70 %   Yellow (%), Wound Tissue Color 30 %   Periwound Area Edematous   Wound Edges Irregular   Wound Length (cm) 24.5 cm   Wound Width (cm) 15 cm   Wound Depth (cm) 5.6 cm   Wound Volume (cm^3) 2058 cm^3   Wound Surface Area (cm^2) 367.5 cm^2   Care Cleansed with:;Sterile normal saline   Dressing Applied;Gauze, wet to dry;Absorptive Pad;Rolled gauze;Elastic bandage   Dressing Change Due 05/22/23  (pm shift)     05/22/2023

## 2023-05-22 NOTE — PLAN OF CARE
Problem: Adult Inpatient Plan of Care  Goal: Plan of Care Review  Outcome: Ongoing, Progressing  Goal: Patient-Specific Goal (Individualized)  Outcome: Ongoing, Progressing  Goal: Absence of Hospital-Acquired Illness or Injury  Outcome: Ongoing, Progressing  Goal: Optimal Comfort and Wellbeing  Outcome: Ongoing, Progressing  Goal: Readiness for Transition of Care  Outcome: Ongoing, Progressing     Problem: Impaired Wound Healing  Goal: Optimal Wound Healing  Outcome: Ongoing, Progressing     Problem: Fall Injury Risk  Goal: Absence of Fall and Fall-Related Injury  Outcome: Ongoing, Progressing     Problem: Pain Acute  Goal: Acceptable Pain Control and Functional Ability  Outcome: Ongoing, Progressing     Problem: Infection  Goal: Absence of Infection Signs and Symptoms  Outcome: Ongoing, Progressing     Problem: Diabetes Comorbidity  Goal: Blood Glucose Level Within Targeted Range  Outcome: Ongoing, Progressing     Problem: Skin Injury Risk Increased  Goal: Skin Health and Integrity  Outcome: Ongoing, Progressing     Problem: Restraint, Nonbehavioral (Nonviolent)  Goal: Absence of Harm or Injury  Outcome: Ongoing, Progressing     Problem: Communication Impairment (Mechanical Ventilation, Invasive)  Goal: Effective Communication  Outcome: Ongoing, Progressing     Problem: Device-Related Complication Risk (Mechanical Ventilation, Invasive)  Goal: Optimal Device Function  Outcome: Ongoing, Progressing     Problem: Inability to Wean (Mechanical Ventilation, Invasive)  Goal: Mechanical Ventilation Liberation  Outcome: Ongoing, Progressing     Problem: Nutrition Impairment (Mechanical Ventilation, Invasive)  Goal: Optimal Nutrition Delivery  Outcome: Ongoing, Progressing     Problem: Skin and Tissue Injury (Mechanical Ventilation, Invasive)  Goal: Absence of Device-Related Skin and Tissue Injury  Outcome: Ongoing, Progressing     Problem: Ventilator-Induced Lung Injury (Mechanical Ventilation, Invasive)  Goal: Absence  of Ventilator-Induced Lung Injury  Outcome: Ongoing, Progressing

## 2023-05-22 NOTE — INTERVAL H&P NOTE
The patient has been examined and the H&P has been reviewed:    I concur with the findings and no changes have occurred since H&P was written.    Surgery risks, benefits and alternative options discussed and understood by patient/family.          Active Hospital Problems    Diagnosis  POA    *Gangrene [I96]  Yes    Acute respiratory failure with hypoxia and hypercarbia [J96.01, J96.02]  No    Acute blood loss anemia [D62]  No    Osteomyelitis [M86.9]  Yes    Diabetic ulcer of midfoot associated with diabetes mellitus due to underlying condition, with fat layer exposed [E08.621, L97.402]  Yes      Resolved Hospital Problems   No resolved problems to display.

## 2023-05-22 NOTE — ASSESSMENT & PLAN NOTE
Patient with Hypercapnic and Hypoxic Respiratory failure which is Acute on chronic.  he is not on home oxygen. Supplemental oxygen was provided and noted- Vent Mode: A/C  Oxygen Concentration (%):  [30-35] 30  Resp Rate Total:  [16 br/min-18 br/min] 16 br/min  Vt Set:  [500 mL] 500 mL  PEEP/CPAP:  [10 cmH20] 10 cmH20  Mean Airway Pressure:  [14 cmH20] 14 cmH20    .   Signs/symptoms of respiratory failure include- use of accessory muscles and lethargy. Contributing diagnoses includes - COPD Labs and images were reviewed. Patient Has recent ABG, which has been reviewed. Will treat underlying causes and adjust management of respiratory failure as follows-      Continue iv abx  cxr looks clear  Wean vent today  Hope to extubate next 24 hrs

## 2023-05-22 NOTE — SUBJECTIVE & OBJECTIVE
Interval History:      Review of Systems   Unable to perform ROS: Intubated   Objective:     Vital Signs (Most Recent):  Temp: 98.1 °F (36.7 °C) (05/22/23 0746)  Pulse: 75 (05/22/23 0746)  Resp: 16 (05/22/23 0746)  BP: 116/65 (05/22/23 0500)  SpO2: 100 % (05/22/23 0746) Vital Signs (24h Range):  Temp:  [96.6 °F (35.9 °C)-98.1 °F (36.7 °C)] 98.1 °F (36.7 °C)  Pulse:  [65-75] 75  Resp:  [16-17] 16  SpO2:  [96 %-100 %] 100 %  BP: (101-153)/(61-87) 116/65     Weight: 89.7 kg (197 lb 12.8 oz)  Body mass index is 28.38 kg/m².    Intake/Output Summary (Last 24 hours) at 5/22/2023 1005  Last data filed at 5/22/2023 0601  Gross per 24 hour   Intake 2884.22 ml   Output 5900 ml   Net -3015.78 ml           Physical Exam  Constitutional:       General: He is not in acute distress.     Appearance: He is obese. He is not ill-appearing.      Comments: Intubated and sedated   Cardiovascular:      Rate and Rhythm: Normal rate and regular rhythm.      Pulses: Normal pulses.      Heart sounds: Normal heart sounds.   Pulmonary:      Effort: Pulmonary effort is normal. No respiratory distress.      Breath sounds: No wheezing or rales.   Abdominal:      General: Abdomen is flat. There is no distension.      Palpations: Abdomen is soft.      Tenderness: There is no abdominal tenderness.   Skin:     General: Skin is warm and dry.      Findings: No erythema or rash.   Neurological:      Comments: Intubated and sedated   Psychiatric:      Comments: Intubated and sedated           Significant Labs: All pertinent labs within the past 24 hours have been reviewed.  BMP:   Recent Labs   Lab 05/22/23  0430      K 3.3*   CO2 35*   BUN 19.0   CREATININE 1.20   CALCIUM 8.7       CBC:   Recent Labs   Lab 05/21/23  0043 05/21/23  0422 05/22/23  0430   WBC 14.87* 15.23* 8.39   HGB 10.5* 8.6* 8.7*   HCT 33.4* 26.9* 27.8*   * 420* 430*         Significant Imaging: I have reviewed all pertinent imaging results/findings within the past 24  hours.

## 2023-05-22 NOTE — PLAN OF CARE
Patient approved for admit to [facility] after weaning off the vent, nursing and MD informed.

## 2023-05-22 NOTE — PLAN OF CARE
REVIEWED.  Problem: Adult Inpatient Plan of Care  Goal: Plan of Care Review  Outcome: Ongoing, Progressing  Goal: Patient-Specific Goal (Individualized)  Outcome: Ongoing, Progressing  Goal: Absence of Hospital-Acquired Illness or Injury  Outcome: Ongoing, Progressing  Goal: Optimal Comfort and Wellbeing  Outcome: Ongoing, Progressing  Goal: Readiness for Transition of Care  Outcome: Ongoing, Progressing     Problem: Impaired Wound Healing  Goal: Optimal Wound Healing  Outcome: Ongoing, Progressing     Problem: Fall Injury Risk  Goal: Absence of Fall and Fall-Related Injury  Outcome: Ongoing, Progressing     Problem: Pain Acute  Goal: Acceptable Pain Control and Functional Ability  Outcome: Ongoing, Progressing     Problem: Infection  Goal: Absence of Infection Signs and Symptoms  Outcome: Ongoing, Progressing     Problem: Diabetes Comorbidity  Goal: Blood Glucose Level Within Targeted Range  Outcome: Ongoing, Progressing     Problem: Skin Injury Risk Increased  Goal: Skin Health and Integrity  Outcome: Ongoing, Progressing     Problem: Restraint, Nonbehavioral (Nonviolent)  Goal: Absence of Harm or Injury  Outcome: Ongoing, Progressing     Problem: Communication Impairment (Mechanical Ventilation, Invasive)  Goal: Effective Communication  Outcome: Ongoing, Progressing     Problem: Device-Related Complication Risk (Mechanical Ventilation, Invasive)  Goal: Optimal Device Function  Outcome: Ongoing, Progressing     Problem: Inability to Wean (Mechanical Ventilation, Invasive)  Goal: Mechanical Ventilation Liberation  Outcome: Ongoing, Progressing     Problem: Nutrition Impairment (Mechanical Ventilation, Invasive)  Goal: Optimal Nutrition Delivery  Outcome: Ongoing, Progressing     Problem: Skin and Tissue Injury (Mechanical Ventilation, Invasive)  Goal: Absence of Device-Related Skin and Tissue Injury  Outcome: Ongoing, Progressing     Problem: Ventilator-Induced Lung Injury (Mechanical Ventilation, Invasive)  Goal:  Absence of Ventilator-Induced Lung Injury  Outcome: Ongoing, Progressing

## 2023-05-22 NOTE — PROGRESS NOTES
Inpatient Nutrition Assessment    Admit Date: 5/16/2023   Total duration of encounter: 6 days     Nutrition Recommendation/Prescription     If unable to extubate in 24-48 hours consider tube feed.    Once extubated ADAT once medically appropriate to 2000 ADA Diet, add double protein to trays.    Continue to encourage PO intake and honor preferences.    RD to monitor patients PO Intake, Weight, Labs, Wound healing and adjust MNT as needed.       Communication of Recommendations: reviewed with nurse and reviewed with patient    Nutrition Assessment     Malnutrition Assessment/Nutrition-Focused Physical Exam    Unable to perform at this time.    Chart Review    Reason Seen: follow-up    Malnutrition Screening Tool Results   Have you recently lost weight without trying?: No  Have you been eating poorly because of a decreased appetite?: No   MST Score: 0     Diagnosis:  Osteomyelitis, Acute Blood Loss Anemia, Diabetic Foot Ulcer.    Relevant Medical History: Acute Osteomyelitis, T2DM, Gas Gangrene of extremity, HLD, Wet Gangrene.     Nutrition-Related Medications: Lasix, Versed< Zosyn, Propofol.    Calorie Containing IV Medications: Diprivan @ 28.6 ml/hr (provides 755 kcal/d)    Nutrition-Related Labs:  (5/18): RBC- 2.33L, HGB- 5.9CL, HCT- 18.9CL, Na- 133L, CrCl- 112.3, Glucose- 179H, Ca- 7.4L, POC Glucose- 184,195,205.    (5/22): RBC- 3.35L, HGB- 8.7L, HCT- 27.8L, MCH- 26.0L, K- 3.3L, CO2- 35H, POC Glucose- 122,117,113.    Diet/PN Order: Diet NPO  Oral Supplement Order: none  Tube Feeding Order: none  Appetite/Oral Intake: NPO/%- 1 Recorded Meal prior to intubation.   Factors Affecting Nutritional Intake: NPO and on mechanical ventilation  Food/Hindu/Cultural Preferences: none reported  Food Allergies: none reported and no known food allergies    Skin Integrity: incision, wound  Wound(s):      Altered Skin Integrity 05/16/23 1335 Right lateral Foot Non pressure chronic ulcer-Tissue loss description: Full  "thickness       Altered Skin Integrity 23 1600 Right plantar Foot Non pressure chronic ulcer-Tissue loss description: Full thickness     Comments    (): Patient reports poor appetite with minimal PO intake for about 2 months and unintentional weight loss of 20#-25#. Patient now reports starving and still hungry after eating lunch. UBW- 240#. GI: WDL, BRDN: 16, 4+ RIGHT LEG AND 3+ RIGHT KNEE EDEMA, 24 HR I/O: 3433/1500.     (): Patient intubated and sedated. Per nurse patient to be extubated tomorrow and transferred to LTAC. GI: WDL, BRDN: 15, GENERALIZED 2+ EDEMA, 24 HR I/O: 2884/1300.    Anthropometrics    Height: 5' 10" (177.8 cm) Height Method: Stated  Last Weight: 89.7 kg (197 lb 12.8 oz) (23 2310) Weight Method: Bed Scale  BMI (Calculated): 28.4  BMI Classification: overweight (BMI 25-29.9)        Ideal Body Weight (IBW), Male: 166 lb     % Ideal Body Weight, Male (lb): 108.43 %  Amputation %: 5.9  Total Amputation %: 5.9           Usual Body Weight (UBW), k.09 kg  % Usual Body Weight: 82.92     Usual Weight Provided By: patient    Wt Readings from Last 5 Encounters:   23 89.7 kg (197 lb 12.8 oz)     Weight Change(s) Since Admission:  Admit Weight: 81.6 kg (180 lb) (23 1445)  (): Patient reports 20-25# weight loss over 2 months and UBW- 240# around November/December showing a 17%) weight loss over 6 months. Weight loss significant per time frame.   (): Patient weighed 90.3 kg on , CBW- 89.7 kg showing a 1.32# (0.66%) weight loss over 5 days. Not significant per time frame.     Estimated Needs    Weight Used For Calorie Calculations: 68.1 kg (150 lb 2.1 oz) (ABW (BKA))  Energy Calorie Requirements (kcal): 9442-8797 KCAL (25-29 KCAL/KG ABW)  Energy Need Method: Kcal/kg  Weight Used For Protein Calculations: 68.1 kg (150 lb 2.1 oz) (ABW (BKA))  Protein Requirements: 75-89 GM PRO (1.1-1.3 GM/KG ABW)  Fluid Requirements (mL): 2045 ML H2O (30 ML/KG ABW)  Temp (24hrs), " Av.9 °F (36.1 °C), Min:96.6 °F (35.9 °C), Max:98.1 °F (36.7 °C)         Enteral Nutrition    Patient not receiving enteral nutrition at this time.    Parenteral Nutrition    Patient not receiving parenteral nutrition support at this time.    Evaluation of Received Nutrient Intake    Calories: meeting estimated needs  Protein: meeting estimated needs    Patient Education    Not applicable.    Nutrition Diagnosis     PES: Increased nutrient needs related to increased protein energy demand for wound healing as evidenced by conditions associated with diagnosis. (continues)    Interventions/Goals     Intervention(s): general/healthful diet, modified composition of meals/snacks, and collaboration with other providers  Goal: Meet Greater than 75% of nutritional needs by discharge. (goal not met)    Monitoring & Evaluation     Dietitian will monitor food and beverage intake, energy intake, weight, weight change, glucose/endocrine profile, and wound healing .  Nutrition Risk/Follow-Up: high (follow-up in 1-4 days)   Please consult if re-assessment needed sooner.

## 2023-05-23 LAB
ALBUMIN SERPL-MCNC: 2.7 G/DL (ref 3.4–5)
ALBUMIN/GLOB SERPL: 0.8 RATIO
ALP SERPL-CCNC: 260 UNIT/L (ref 50–144)
ALT SERPL-CCNC: 23 UNIT/L (ref 1–45)
ANION GAP SERPL CALC-SCNC: 6 MEQ/L (ref 2–13)
ANISOCYTOSIS BLD QL SMEAR: ABNORMAL
AST SERPL-CCNC: 20 UNIT/L (ref 17–59)
BACTERIA SPT CULT: NORMAL
BASE EXCESS BLD CALC-SCNC: 13.3 MMOL/L (ref -2–2)
BASOPHILS # BLD AUTO: 0.06 X10(3)/MCL (ref 0.01–0.08)
BASOPHILS NFR BLD AUTO: 0.7 % (ref 0.1–1.2)
BILIRUBIN DIRECT+TOT PNL SERPL-MCNC: 0.7 MG/DL (ref 0–1)
BLOOD GAS SAMPLE TYPE (OHS): ABNORMAL
BUN SERPL-MCNC: 19 MG/DL (ref 7–20)
CALCIUM SERPL-MCNC: 8.8 MG/DL (ref 8.4–10.2)
CHLORIDE SERPL-SCNC: 101 MMOL/L (ref 98–110)
CO2 SERPL-SCNC: 37 MMOL/L (ref 21–32)
COHGB MFR BLDA: 1.6 % (ref 0–1.5)
CREAT SERPL-MCNC: 1.35 MG/DL (ref 0.66–1.25)
CREAT/UREA NIT SERPL: 14 (ref 12–20)
ELLIPTOCYTOSIS (OHS): ABNORMAL
EOSINOPHIL # BLD AUTO: 0.33 X10(3)/MCL (ref 0.04–0.54)
EOSINOPHIL NFR BLD AUTO: 4.1 % (ref 0.7–7)
ERYTHROCYTE [DISTWIDTH] IN BLOOD BY AUTOMATED COUNT: 19.7 %
FIO2 BLOOD GAS (OHS): 30 %
GFR SERPLBLD CREATININE-BSD FMLA CKD-EPI: 62 MLS/MIN/1.73/M2
GLOBULIN SER-MCNC: 3.5 GM/DL (ref 2–3.9)
GLUCOSE SERPL-MCNC: 87 MG/DL (ref 70–115)
GRAM STN SPEC: NORMAL
GRAM STN SPEC: NORMAL
HCO3 BLDA-SCNC: 37.1 MMOL/L (ref 22–26)
HCT VFR BLD AUTO: 29.9 % (ref 36–52)
HGB BLD-MCNC: 9.3 G/DL (ref 13–18)
IMM GRANULOCYTES # BLD AUTO: 0.04 X10(3)/MCL (ref 0–0.03)
IMM GRANULOCYTES NFR BLD AUTO: 0.5 % (ref 0–0.5)
IP (OHS): 0 CMH2O
LYMPHOCYTES # BLD AUTO: 1.08 X10(3)/MCL (ref 1.32–3.57)
LYMPHOCYTES NFR BLD AUTO: 13.3 % (ref 20–55)
MAGNESIUM SERPL-MCNC: 2 MG/DL (ref 1.8–2.4)
MCH RBC QN AUTO: 26.3 PG (ref 27–34)
MCHC RBC AUTO-ENTMCNC: 31.1 G/DL (ref 31–37)
MCV RBC AUTO: 84.5 FL (ref 79–99)
METHGB MFR BLDA: ABNORMAL %
MICROCYTES BLD QL SMEAR: ABNORMAL
MODE (OHS): ABNORMAL
MONOCYTES # BLD AUTO: 0.66 X10(3)/MCL (ref 0.3–0.82)
MONOCYTES NFR BLD AUTO: 8.1 % (ref 4.7–12.5)
NEUTROPHILS # BLD AUTO: 5.96 X10(3)/MCL (ref 1.78–5.38)
NEUTROPHILS NFR BLD AUTO: 73.3 % (ref 37–73)
NRBC BLD AUTO-RTO: 0 %
PCO2 BLDA: 49 MMHG (ref 35–45)
PEEP (OHS): 8 CMH2O
PH BLDA: 7.5 [PH] (ref 7.35–7.45)
PIP (OHS): 0 CMH20
PLATELET # BLD AUTO: 449 X10(3)/MCL (ref 140–371)
PLATELET # BLD EST: ABNORMAL 10*3/UL
PMV BLD AUTO: ABNORMAL FL
PO2 BLDA: 111 MMHG (ref 80–105)
POCT GLUCOSE: 122 MG/DL (ref 70–110)
POCT GLUCOSE: 82 MG/DL (ref 70–110)
POCT GLUCOSE: 95 MG/DL (ref 70–110)
POIKILOCYTOSIS BLD QL SMEAR: ABNORMAL
POTASSIUM SERPL-SCNC: 3.3 MMOL/L (ref 3.5–5.1)
PROT SERPL-MCNC: 6.2 GM/DL (ref 6.3–8.2)
RBC # BLD AUTO: 3.54 X10(6)/MCL (ref 4–6)
RBC MORPH BLD: ABNORMAL
SAO2 % BLDA: 99.3 % (ref 95–100)
SCHISTOCYTE (OLG): ABNORMAL
SODIUM SERPL-SCNC: 144 MMOL/L (ref 135–145)
WBC # SPEC AUTO: 8.13 X10(3)/MCL (ref 4–11.5)

## 2023-05-23 PROCEDURE — 94003 VENT MGMT INPAT SUBQ DAY: CPT

## 2023-05-23 PROCEDURE — 99900035 HC TECH TIME PER 15 MIN (STAT)

## 2023-05-23 PROCEDURE — 25000003 PHARM REV CODE 250: Performed by: INTERNAL MEDICINE

## 2023-05-23 PROCEDURE — 25000003 PHARM REV CODE 250: Performed by: FAMILY MEDICINE

## 2023-05-23 PROCEDURE — 94761 N-INVAS EAR/PLS OXIMETRY MLT: CPT

## 2023-05-23 PROCEDURE — 85025 COMPLETE CBC W/AUTO DIFF WBC: CPT | Performed by: FAMILY MEDICINE

## 2023-05-23 PROCEDURE — 27000221 HC OXYGEN, UP TO 24 HOURS

## 2023-05-23 PROCEDURE — 20000000 HC ICU ROOM

## 2023-05-23 PROCEDURE — 83735 ASSAY OF MAGNESIUM: CPT | Performed by: FAMILY MEDICINE

## 2023-05-23 PROCEDURE — 36415 COLL VENOUS BLD VENIPUNCTURE: CPT | Performed by: FAMILY MEDICINE

## 2023-05-23 PROCEDURE — A4216 STERILE WATER/SALINE, 10 ML: HCPCS | Performed by: FAMILY MEDICINE

## 2023-05-23 PROCEDURE — 63600175 PHARM REV CODE 636 W HCPCS: Performed by: INTERNAL MEDICINE

## 2023-05-23 PROCEDURE — 63600175 PHARM REV CODE 636 W HCPCS: Performed by: FAMILY MEDICINE

## 2023-05-23 PROCEDURE — 80053 COMPREHEN METABOLIC PANEL: CPT | Performed by: FAMILY MEDICINE

## 2023-05-23 RX ORDER — POTASSIUM CHLORIDE 20 MEQ/1
40 TABLET, EXTENDED RELEASE ORAL DAILY
Status: DISCONTINUED | OUTPATIENT
Start: 2023-05-23 | End: 2023-05-25 | Stop reason: HOSPADM

## 2023-05-23 RX ORDER — FUROSEMIDE 10 MG/ML
INJECTION INTRAMUSCULAR; INTRAVENOUS
Status: DISPENSED
Start: 2023-05-23 | End: 2023-05-24

## 2023-05-23 RX ORDER — FUROSEMIDE 10 MG/ML
20 INJECTION INTRAMUSCULAR; INTRAVENOUS
Status: DISCONTINUED | OUTPATIENT
Start: 2023-05-23 | End: 2023-05-23

## 2023-05-23 RX ORDER — FUROSEMIDE 10 MG/ML
20 INJECTION INTRAMUSCULAR; INTRAVENOUS
Status: DISCONTINUED | OUTPATIENT
Start: 2023-05-23 | End: 2023-05-25 | Stop reason: HOSPADM

## 2023-05-23 RX ADMIN — PIPERACILLIN AND TAZOBACTAM 4.5 G: 4; .5 INJECTION, POWDER, FOR SOLUTION INTRAVENOUS; PARENTERAL at 09:05

## 2023-05-23 RX ADMIN — FUROSEMIDE 20 MG: 10 INJECTION, SOLUTION INTRAVENOUS at 05:05

## 2023-05-23 RX ADMIN — FUROSEMIDE 20 MG: 10 INJECTION, SOLUTION INTRAVENOUS at 02:05

## 2023-05-23 RX ADMIN — SODIUM CHLORIDE, PRESERVATIVE FREE 10 ML: 5 INJECTION INTRAVENOUS at 02:05

## 2023-05-23 RX ADMIN — PROPOFOL 50 MCG/KG/MIN: 10 INJECTION, EMULSION INTRAVENOUS at 05:05

## 2023-05-23 RX ADMIN — POTASSIUM CHLORIDE 40 MEQ: 20 TABLET, EXTENDED RELEASE ORAL at 03:05

## 2023-05-23 RX ADMIN — WHITE PETROLATUM 57.7 %-MINERAL OIL 31.9 % EYE OINTMENT: at 09:05

## 2023-05-23 RX ADMIN — CHLORHEXIDINE GLUCONATE 15 ML: 1.2 RINSE ORAL at 09:05

## 2023-05-23 RX ADMIN — PIPERACILLIN AND TAZOBACTAM 4.5 G: 4; .5 INJECTION, POWDER, FOR SOLUTION INTRAVENOUS; PARENTERAL at 12:05

## 2023-05-23 RX ADMIN — SODIUM CHLORIDE, PRESERVATIVE FREE 10 ML: 5 INJECTION INTRAVENOUS at 05:05

## 2023-05-23 RX ADMIN — PROPOFOL 50 MCG/KG/MIN: 10 INJECTION, EMULSION INTRAVENOUS at 02:05

## 2023-05-23 RX ADMIN — FUROSEMIDE 20 MG: 10 INJECTION, SOLUTION INTRAVENOUS at 10:05

## 2023-05-23 RX ADMIN — SODIUM CHLORIDE, PRESERVATIVE FREE 10 ML: 5 INJECTION INTRAVENOUS at 07:05

## 2023-05-23 RX ADMIN — SODIUM CHLORIDE, PRESERVATIVE FREE 10 ML: 5 INJECTION INTRAVENOUS at 12:05

## 2023-05-23 RX ADMIN — COLLAGENASE SANTYL: 250 OINTMENT TOPICAL at 09:05

## 2023-05-23 RX ADMIN — PIPERACILLIN AND TAZOBACTAM 4.5 G: 4; .5 INJECTION, POWDER, FOR SOLUTION INTRAVENOUS; PARENTERAL at 03:05

## 2023-05-23 RX ADMIN — FUROSEMIDE 20 MG: 10 INJECTION, SOLUTION INTRAVENOUS at 12:05

## 2023-05-23 NOTE — ASSESSMENT & PLAN NOTE
Patient with Hypercapnic and Hypoxic Respiratory failure which is Acute on chronic.  he is not on home oxygen. Supplemental oxygen was provided and noted- Vent Mode: Spont  Oxygen Concentration (%):  [30] 30  Resp Rate Total:  [11 br/min-25 br/min] 22 br/min  Vt Set:  [500 mL] 500 mL  PEEP/CPAP:  [5 cmH20] 5 cmH20  Pressure Support:  [8 cmH20] 8 cmH20  Mean Airway Pressure:  [7.4 cmH20-8.1 cmH20] 7.5 cmH20    .   Signs/symptoms of respiratory failure include- use of accessory muscles and lethargy. Contributing diagnoses includes - COPD Labs and images were reviewed. Patient Has recent ABG, which has been reviewed. Will treat underlying causes and adjust management of respiratory failure as follows-      Continue iv abx  cxr looks clear  Extubate this am
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
## 2023-05-23 NOTE — SUBJECTIVE & OBJECTIVE
Interval History:      Review of Systems   Unable to perform ROS: Intubated   Objective:     Vital Signs (Most Recent):  Temp: 98.7 °F (37.1 °C) (05/23/23 1104)  Pulse: 95 (05/23/23 1115)  Resp: 18 (05/23/23 0934)  BP: (!) 164/82 (05/23/23 1100)  SpO2: 97 % (05/23/23 1115) Vital Signs (24h Range):  Temp:  [98.2 °F (36.8 °C)-99.1 °F (37.3 °C)] 98.7 °F (37.1 °C)  Pulse:  [75-98] 95  Resp:  [12-24] 18  SpO2:  [96 %-100 %] 97 %  BP: (126-166)/(66-96) 164/82     Weight: 85.4 kg (188 lb 4.4 oz)  Body mass index is 27.01 kg/m².    Intake/Output Summary (Last 24 hours) at 5/23/2023 1149  Last data filed at 5/23/2023 1105  Gross per 24 hour   Intake 900.5 ml   Output 7415 ml   Net -6514.5 ml           Physical Exam  Constitutional:       General: He is not in acute distress.     Appearance: He is obese. He is not ill-appearing.      Comments: Intubated and sedated   Cardiovascular:      Rate and Rhythm: Normal rate and regular rhythm.      Pulses: Normal pulses.      Heart sounds: Normal heart sounds.   Pulmonary:      Effort: Pulmonary effort is normal. No respiratory distress.      Breath sounds: No wheezing or rales.   Abdominal:      General: Abdomen is flat. There is no distension.      Palpations: Abdomen is soft.      Tenderness: There is no abdominal tenderness.   Skin:     General: Skin is warm and dry.      Findings: No erythema or rash.   Neurological:      Comments: Intubated and sedated   Psychiatric:      Comments: Intubated and sedated           Significant Labs: All pertinent labs within the past 24 hours have been reviewed.  BMP:   Recent Labs   Lab 05/23/23  0403      K 3.3*   CO2 37*   BUN 19.0   CREATININE 1.35*   CALCIUM 8.8   MG 2.00       CBC:   Recent Labs   Lab 05/22/23  0430 05/23/23  0403   WBC 8.39 8.13   HGB 8.7* 9.3*   HCT 27.8* 29.9*   * 449*         Significant Imaging: I have reviewed all pertinent imaging results/findings within the past 24 hours.

## 2023-05-24 ENCOUNTER — ANESTHESIA EVENT (OUTPATIENT)
Dept: SURGERY | Facility: HOSPITAL | Age: 58
DRG: 853 | End: 2023-05-24
Payer: MEDICAID

## 2023-05-24 ENCOUNTER — ANESTHESIA (OUTPATIENT)
Dept: SURGERY | Facility: HOSPITAL | Age: 58
DRG: 853 | End: 2023-05-24
Payer: MEDICAID

## 2023-05-24 LAB
ALBUMIN SERPL-MCNC: 2.9 G/DL (ref 3.4–5)
ALBUMIN/GLOB SERPL: 0.8 RATIO
ALP SERPL-CCNC: 267 UNIT/L (ref 50–144)
ALT SERPL-CCNC: 21 UNIT/L (ref 1–45)
ANION GAP SERPL CALC-SCNC: 2 MEQ/L (ref 2–13)
AST SERPL-CCNC: 32 UNIT/L (ref 17–59)
BILIRUBIN DIRECT+TOT PNL SERPL-MCNC: 0.7 MG/DL (ref 0–1)
BUN SERPL-MCNC: 16 MG/DL (ref 7–20)
CALCIUM SERPL-MCNC: 8.5 MG/DL (ref 8.4–10.2)
CHLORIDE SERPL-SCNC: 98 MMOL/L (ref 98–110)
CO2 SERPL-SCNC: 39 MMOL/L (ref 21–32)
CREAT SERPL-MCNC: 1.18 MG/DL (ref 0.66–1.25)
CREAT/UREA NIT SERPL: 14 (ref 12–20)
ERYTHROCYTE [DISTWIDTH] IN BLOOD BY AUTOMATED COUNT: 19 %
GFR SERPLBLD CREATININE-BSD FMLA CKD-EPI: 73 MLS/MIN/1.73/M2
GLOBULIN SER-MCNC: 3.7 GM/DL (ref 2–3.9)
GLUCOSE SERPL-MCNC: 115 MG/DL (ref 70–115)
HCT VFR BLD AUTO: 30.6 % (ref 36–52)
HGB BLD-MCNC: 9.5 G/DL (ref 13–18)
MCH RBC QN AUTO: 26.1 PG (ref 27–34)
MCHC RBC AUTO-ENTMCNC: 31 G/DL (ref 31–37)
MCV RBC AUTO: 84.1 FL (ref 79–99)
NRBC BLD AUTO-RTO: 0 %
PLATELET # BLD AUTO: 493 X10(3)/MCL (ref 140–371)
PMV BLD AUTO: 12.4 FL (ref 9.4–12.4)
POCT GLUCOSE: 143 MG/DL (ref 70–110)
POCT GLUCOSE: 168 MG/DL (ref 70–110)
POCT GLUCOSE: 98 MG/DL (ref 70–110)
POTASSIUM SERPL-SCNC: 3.6 MMOL/L (ref 3.5–5.1)
PROT SERPL-MCNC: 6.6 GM/DL (ref 6.3–8.2)
RBC # BLD AUTO: 3.64 X10(6)/MCL (ref 4–6)
SODIUM SERPL-SCNC: 139 MMOL/L (ref 135–145)
WBC # SPEC AUTO: 8.1 X10(3)/MCL (ref 4–11.5)

## 2023-05-24 PROCEDURE — 63600175 PHARM REV CODE 636 W HCPCS: Performed by: FAMILY MEDICINE

## 2023-05-24 PROCEDURE — 36000707: Performed by: SURGERY

## 2023-05-24 PROCEDURE — 36415 COLL VENOUS BLD VENIPUNCTURE: CPT | Performed by: FAMILY MEDICINE

## 2023-05-24 PROCEDURE — 85027 COMPLETE CBC AUTOMATED: CPT | Performed by: FAMILY MEDICINE

## 2023-05-24 PROCEDURE — D9220A PRA ANESTHESIA: ICD-10-PCS | Mod: ,,, | Performed by: NURSE ANESTHETIST, CERTIFIED REGISTERED

## 2023-05-24 PROCEDURE — 25000003 PHARM REV CODE 250: Performed by: FAMILY MEDICINE

## 2023-05-24 PROCEDURE — 63600175 PHARM REV CODE 636 W HCPCS: Performed by: NURSE ANESTHETIST, CERTIFIED REGISTERED

## 2023-05-24 PROCEDURE — 87070 CULTURE OTHR SPECIMN AEROBIC: CPT | Performed by: SURGERY

## 2023-05-24 PROCEDURE — 63600175 PHARM REV CODE 636 W HCPCS: Performed by: SURGERY

## 2023-05-24 PROCEDURE — D9220A PRA ANESTHESIA: Mod: ,,, | Performed by: NURSE ANESTHETIST, CERTIFIED REGISTERED

## 2023-05-24 PROCEDURE — 87205 SMEAR GRAM STAIN: CPT | Performed by: SURGERY

## 2023-05-24 PROCEDURE — 25000003 PHARM REV CODE 250: Performed by: NURSE ANESTHETIST, CERTIFIED REGISTERED

## 2023-05-24 PROCEDURE — 37000008 HC ANESTHESIA 1ST 15 MINUTES: Performed by: SURGERY

## 2023-05-24 PROCEDURE — 27201423 OPTIME MED/SURG SUP & DEVICES STERILE SUPPLY: Performed by: SURGERY

## 2023-05-24 PROCEDURE — 11000001 HC ACUTE MED/SURG PRIVATE ROOM

## 2023-05-24 PROCEDURE — 63600175 PHARM REV CODE 636 W HCPCS: Performed by: INTERNAL MEDICINE

## 2023-05-24 PROCEDURE — A4216 STERILE WATER/SALINE, 10 ML: HCPCS | Performed by: FAMILY MEDICINE

## 2023-05-24 PROCEDURE — C1889 IMPLANT/INSERT DEVICE, NOC: HCPCS | Performed by: SURGERY

## 2023-05-24 PROCEDURE — 87075 CULTR BACTERIA EXCEPT BLOOD: CPT | Performed by: SURGERY

## 2023-05-24 PROCEDURE — 80053 COMPREHEN METABOLIC PANEL: CPT | Performed by: FAMILY MEDICINE

## 2023-05-24 PROCEDURE — 94761 N-INVAS EAR/PLS OXIMETRY MLT: CPT

## 2023-05-24 PROCEDURE — 36000706: Performed by: SURGERY

## 2023-05-24 PROCEDURE — 37000009 HC ANESTHESIA EA ADD 15 MINS: Performed by: SURGERY

## 2023-05-24 PROCEDURE — S5010 5% DEXTROSE AND 0.45% SALINE: HCPCS | Performed by: NURSE ANESTHETIST, CERTIFIED REGISTERED

## 2023-05-24 RX ORDER — MIDAZOLAM HYDROCHLORIDE 1 MG/ML
2 INJECTION INTRAMUSCULAR; INTRAVENOUS
Status: COMPLETED | OUTPATIENT
Start: 2023-05-24 | End: 2023-05-24

## 2023-05-24 RX ORDER — KETAMINE HYDROCHLORIDE 10 MG/ML
INJECTION, SOLUTION INTRAMUSCULAR; INTRAVENOUS
Status: DISCONTINUED | OUTPATIENT
Start: 2023-05-24 | End: 2023-05-24

## 2023-05-24 RX ORDER — PROPOFOL 10 MG/ML
VIAL (ML) INTRAVENOUS
Status: DISCONTINUED | OUTPATIENT
Start: 2023-05-24 | End: 2023-05-24

## 2023-05-24 RX ORDER — DEXTROSE MONOHYDRATE AND SODIUM CHLORIDE 5; .45 G/100ML; G/100ML
INJECTION, SOLUTION INTRAVENOUS CONTINUOUS PRN
Status: DISCONTINUED | OUTPATIENT
Start: 2023-05-24 | End: 2023-05-24

## 2023-05-24 RX ORDER — FAMOTIDINE 20 MG/1
20 TABLET, FILM COATED ORAL
Status: DISCONTINUED | OUTPATIENT
Start: 2023-05-25 | End: 2023-05-25 | Stop reason: HOSPADM

## 2023-05-24 RX ORDER — TRANEXAMIC ACID 100 MG/ML
INJECTION, SOLUTION INTRAVENOUS
Status: DISCONTINUED | OUTPATIENT
Start: 2023-05-24 | End: 2023-05-24

## 2023-05-24 RX ADMIN — DEXTROSE AND SODIUM CHLORIDE: 5; 450 INJECTION, SOLUTION INTRAVENOUS at 04:05

## 2023-05-24 RX ADMIN — KETAMINE HYDROCHLORIDE 10 MG: 10 INJECTION, SOLUTION INTRAMUSCULAR; INTRAVENOUS at 05:05

## 2023-05-24 RX ADMIN — FUROSEMIDE 20 MG: 10 INJECTION, SOLUTION INTRAVENOUS at 09:05

## 2023-05-24 RX ADMIN — TRANEXAMIC ACID 1000 MG: 100 INJECTION, SOLUTION INTRAVENOUS at 05:05

## 2023-05-24 RX ADMIN — COLLAGENASE SANTYL: 250 OINTMENT TOPICAL at 08:05

## 2023-05-24 RX ADMIN — PROPOFOL 30 MG: 10 INJECTION, EMULSION INTRAVENOUS at 04:05

## 2023-05-24 RX ADMIN — MIDAZOLAM HYDROCHLORIDE 2 MG: 1 INJECTION, SOLUTION INTRAMUSCULAR; INTRAVENOUS at 05:05

## 2023-05-24 RX ADMIN — Medication 10 ML: at 09:05

## 2023-05-24 RX ADMIN — PROPOFOL 20 MG: 10 INJECTION, EMULSION INTRAVENOUS at 05:05

## 2023-05-24 RX ADMIN — FENTANYL CITRATE 100 MCG: 50 INJECTION, SOLUTION INTRAMUSCULAR; INTRAVENOUS at 05:05

## 2023-05-24 RX ADMIN — SODIUM CHLORIDE, PRESERVATIVE FREE 10 ML: 5 INJECTION INTRAVENOUS at 12:05

## 2023-05-24 RX ADMIN — PIPERACILLIN AND TAZOBACTAM 4.5 G: 4; .5 INJECTION, POWDER, FOR SOLUTION INTRAVENOUS; PARENTERAL at 12:05

## 2023-05-24 RX ADMIN — SODIUM CHLORIDE, PRESERVATIVE FREE 10 ML: 5 INJECTION INTRAVENOUS at 05:05

## 2023-05-24 RX ADMIN — PIPERACILLIN AND TAZOBACTAM 4.5 G: 4; .5 INJECTION, POWDER, FOR SOLUTION INTRAVENOUS; PARENTERAL at 08:05

## 2023-05-24 RX ADMIN — PIPERACILLIN AND TAZOBACTAM 4.5 G: 4; .5 INJECTION, POWDER, FOR SOLUTION INTRAVENOUS; PARENTERAL at 03:05

## 2023-05-24 RX ADMIN — KETAMINE HYDROCHLORIDE 20 MG: 10 INJECTION, SOLUTION INTRAMUSCULAR; INTRAVENOUS at 04:05

## 2023-05-24 RX ADMIN — FUROSEMIDE 20 MG: 10 INJECTION, SOLUTION INTRAVENOUS at 08:05

## 2023-05-24 RX ADMIN — IRON SUCROSE 100 MG: 20 INJECTION, SOLUTION INTRAVENOUS at 09:05

## 2023-05-24 RX ADMIN — SODIUM CHLORIDE, PRESERVATIVE FREE 10 ML: 5 INJECTION INTRAVENOUS at 06:05

## 2023-05-24 NOTE — ANESTHESIA POSTPROCEDURE EVALUATION
Anesthesia Post Evaluation    Patient: Honorio Robb    Procedure(s) Performed: Procedure(s) (LRB):  DEBRIDEMENT, WOUND (Right)    Final Anesthesia Type: MAC      Patient location during evaluation: floor  Patient participation: Yes- Able to Participate  Level of consciousness: awake and alert, awake and oriented  Post-procedure vital signs: reviewed and stable  Pain management: adequate  Airway patency: patent    PONV status at discharge: No PONV  Anesthetic complications: no      Cardiovascular status: blood pressure returned to baseline  Respiratory status: unassisted, room air and spontaneous ventilation  Hydration status: euvolemic  Follow-up not needed.          Vitals Value Taken Time   /75 05/24/23 1602   Temp 36.5 °C (97.7 °F) 05/24/23 1501   Pulse 89 05/24/23 1630   Resp 15 05/24/23 1629   SpO2 96 % 05/24/23 1630   Vitals shown include unvalidated device data.      No case tracking events are documented in the log.      Pain/Julio Score: No data recorded

## 2023-05-24 NOTE — PLAN OF CARE
DR. MACK ROUNDED, PLAN OF CARE REVIEWED AND DISCUSSED WITH PATIENT AT BEDSIDE. PATIENT VOICES UNDERSTANDING.
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
## 2023-05-24 NOTE — SUBJECTIVE & OBJECTIVE
Interval History:      Review of Systems   Constitutional:  Negative for appetite change, fatigue and fever.   Respiratory:  Negative for cough, shortness of breath and wheezing.    Cardiovascular:  Negative for chest pain and leg swelling.   Gastrointestinal:  Negative for abdominal distention, abdominal pain, constipation, diarrhea, nausea and vomiting.   Skin:  Positive for wound. Negative for color change, pallor and rash.   Neurological:  Negative for tremors, syncope and headaches.   Psychiatric/Behavioral:  Negative for agitation and behavioral problems.    Objective:     Vital Signs (Most Recent):  Temp: 97.7 °F (36.5 °C) (05/24/23 0701)  Pulse: 89 (05/24/23 0900)  Resp: 11 (05/24/23 0900)  BP: 123/66 (05/24/23 0900)  SpO2: 96 % (05/24/23 0900) Vital Signs (24h Range):  Temp:  [97.7 °F (36.5 °C)-98.7 °F (37.1 °C)] 97.7 °F (36.5 °C)  Pulse:  [] 89  Resp:  [10-31] 11  SpO2:  [94 %-100 %] 96 %  BP: (113-178)/() 123/66     Weight: 83.1 kg (183 lb 3.2 oz)  Body mass index is 26.29 kg/m².    Intake/Output Summary (Last 24 hours) at 5/24/2023 0941  Last data filed at 5/24/2023 0800  Gross per 24 hour   Intake 1320 ml   Output 4056 ml   Net -2736 ml           Physical Exam  Constitutional:       General: He is not in acute distress.     Appearance: He is obese. He is not ill-appearing.      Comments:     Cardiovascular:      Rate and Rhythm: Normal rate and regular rhythm.      Pulses: Normal pulses.      Heart sounds: Normal heart sounds.   Pulmonary:      Effort: Pulmonary effort is normal. No respiratory distress.      Breath sounds: No wheezing or rales.   Abdominal:      General: There is no distension.      Palpations: Abdomen is soft.      Tenderness: There is no abdominal tenderness.   Skin:     General: Skin is warm and dry.      Findings: Lesion present. No erythema or rash.      Comments: Right foot nurses' notes and wound care notes and photos reviewed with nursing staff   Neurological:       Mental Status: Mental status is at baseline.      Comments:     Psychiatric:         Mood and Affect: Mood normal.         Behavior: Behavior normal.      Comments:             Significant Labs: All pertinent labs within the past 24 hours have been reviewed.  BMP:   Recent Labs   Lab 05/23/23  0403 05/24/23  0402    139   K 3.3* 3.6   CO2 37* 39*   BUN 19.0 16.0   CREATININE 1.35* 1.18   CALCIUM 8.8 8.5   MG 2.00  --        CBC:   Recent Labs   Lab 05/23/23  0403 05/24/23  0402   WBC 8.13 8.10   HGB 9.3* 9.5*   HCT 29.9* 30.6*   * 493*         Significant Imaging: I have reviewed all pertinent imaging results/findings within the past 24 hours.

## 2023-05-24 NOTE — ASSESSMENT & PLAN NOTE
Patient's FSGs are controlled on current medication regimen.  Last A1c reviewed-   Lab Results   Component Value Date    HGBA1C 5.5 05/17/2023     Most recent fingerstick glucose reviewed-   Recent Labs   Lab 05/23/23  1232 05/23/23  1850 05/24/23  0004   POCTGLUCOSE 95 122* 143*     Current correctional scale  Low  Maintain anti-hyperglycemic dose as follows-   Antihyperglycemics (From admission, onward)    Start     Stop Route Frequency Ordered    05/21/23 0209  insulin aspart U-100 pen 0-5 Units         -- SubQ Every 6 hours PRN 05/21/23 0109        Continue iv abx  continue metformin 500mg bid

## 2023-05-24 NOTE — NURSING
PATIENT LEFT FOR SURGERY PER HOSPITAL BED IN STABLE CONDITION PER PJ FARMER RN. PATIENT AND PJ AWARE OF ROOM CHANGE TO ODEC TELE  AFTER SX COMPLETED. CALLED [facility] FOR REPORT SPOKE WITH LILLIE RAYMOND WHO STATES NURSE WILL ASSIGNED TO JEANMARIE JUARES. MOR UNAVAILABLE FOR REPORT WILL CALL BACK PATIENT WILL BE ASSIGNED TO TELE 7.

## 2023-05-24 NOTE — ANESTHESIA PREPROCEDURE EVALUATION
05/24/2023  Honorio Robb is a 57 y.o., male.      Pre-op Assessment    I have reviewed the Patient Summary Reports.     I have reviewed the Nursing Notes. I have reviewed the NPO Status.   I have reviewed the Medications.     Review of Systems  Anesthesia Hx:  No problems with previous Anesthesia  Denies Family Hx of Anesthesia complications.   Denies Personal Hx of Anesthesia complications.   Hematology/Oncology:  Hematology Normal   Oncology Normal     EENT/Dental:EENT/Dental Normal   Cardiovascular:  Cardiovascular Normal Exercise tolerance: good     Pulmonary:  Pulmonary Normal    Renal/:  Renal/ Normal     Hepatic/GI:  Hepatic/GI Normal    Musculoskeletal:  Musculoskeletal Normal  Musculoskeletal General/Symptoms:    Neurological:  Neurology Normal    Endocrine:   Diabetes, poorly controlled, type 2  Diabetes, Type 2 Diabetes    Dermatological:  Skin Normal    Psych:  Psychiatric Normal           Physical Exam  General: Well nourished, Cooperative, Alert and Oriented    Airway:  Mallampati: II / II  Mouth Opening: Normal  TM Distance: Normal  Tongue: Normal  Neck ROM: Normal ROM    Dental:  Intact        Anesthesia Plan  Type of Anesthesia, risks & benefits discussed:    Anesthesia Type: MAC  Intra-op Monitoring Plan: Standard ASA Monitors  Post Op Pain Control Plan: multimodal analgesia  Induction:  IV  Airway Plan: Direct  Informed Consent: Informed consent signed with the Patient and all parties understand the risks and agree with anesthesia plan.  All questions answered. Patient consented to blood products? Yes  ASA Score: 4 Emergent  Day of Surgery Review of History & Physical: H&P Update referred to the surgeon/provider.I have interviewed and examined the patient. I have reviewed the patient's H&P dated: There are no significant changes.     Ready For Surgery From Anesthesia Perspective.      .

## 2023-05-24 NOTE — PLAN OF CARE
Rec'd call from [facility] stating they can accept patient tomorrow if medically stable, nursing informed.

## 2023-05-24 NOTE — OP NOTE
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  Patient had that time had severe swelling of the foot with massive infection that required excisional debridement skin to bone.  Patient was underwent dressing changes and had antibiotics given leg elevation and correction of his massive metabolic imbalance.  Patient's swelling is improved and he is now ready for further debridement and possible amputation of the 4th and 5th toes which I explained to him might be a possibility.    Patient was in supine position general anesthetic prepped and draped in a sterile fashion and had excisional debridement of the skin soft tissue to the bone bone was soft on the 4th and 5th metatarsal consistent with osteomyelitis noted on x-ray.  I went ahead and amputated the 4th and 5th toes as well as the entire metatarsal bone that was left from his previous debridement.  Patient had this amputation done with a 15 blade scalpel hemostasis was achieved with Bovie cautery wet-to-dry  saline dressings were applied I did debride the remainder of the talus and calcaneus that was soft as well.  Biting rongeur were used to debride this area.  Patient is insistent on keeping his foot as much as possible and as a result we will make attempts at further debridements were dressing changes and hopefully antibiotics and salvage his foot.  It looks much better than it did several days ago but it still has progress to make.  Patient will undergo IV antibiotics wet-to-dry saline dressings and then probably LTAC placement with follow up in the next week or so to see if further debridement is necessary.  I have encouraged him to try to flex his ankle and foot laterally to prevent any inversion of his foot.        Description of Technical Procedures:  Noted above       Significant Surgical Tasks Conducted by the Assistant(s), if Applicable:  None       Estimated Blood Loss (EBL): 250 mL           Implants: * No implants in log *    Specimens:   Specimen (24h ago, onward)       Start     Ordered    05/24/23 1736  Specimen to Pathology  RELEASE UPON ORDERING        Comments: Specimen A: Diagnosis: osteomyelitits of the right 4th and 5th metatarsal     References:    Click here for ordering Quick Tip   Question:  Release to patient  Answer:  Immediate    05/24/23 1736    05/24/23 1721  Specimen to Pathology General Surgery  Once        Comments: Diagnosis: osteomyelitis of 4th & 5th metatarsal     References:    Click here for ordering Quick Tip   Question Answer Comment   Procedure Type: General Surgery    Specimen Source Tissue    Clinical Information: right 4th and 5th metatarsal    Release to patient Immediate        05/24/23 1728                            Condition: Good    Disposition: PACU - hemodynamically stable.    Attestation: I was present and scrubbed for the entire procedure.    Discharge Note    OUTCOME: Patient tolerated treatment/procedure well without complication and is now ready for  discharge.        DISPOSITION: Home or Self Care    FINAL DIAGNOSIS:  Gangrene right foot transmetatarsal amputation of the 4th and 5th toes with debridement of the skin to bone and culturing of the bone along the heels talus and calcaneus    FOLLOWUP: In clinic one-week    DISCHARGE INSTRUCTIONS:  No discharge procedures on file.

## 2023-05-24 NOTE — ASSESSMENT & PLAN NOTE
Right foot osteomyelitis  Will need 6 weeks of iv abx per surgery  Continue iv abx  He is accepted for transfer to LTAC  Will need further debridement today and if no issues can go to LTAC in am

## 2023-05-25 VITALS
HEIGHT: 70 IN | OXYGEN SATURATION: 100 % | RESPIRATION RATE: 18 BRPM | WEIGHT: 183 LBS | BODY MASS INDEX: 26.2 KG/M2 | HEART RATE: 95 BPM | DIASTOLIC BLOOD PRESSURE: 55 MMHG | TEMPERATURE: 98 F | SYSTOLIC BLOOD PRESSURE: 121 MMHG

## 2023-05-25 LAB
ANION GAP SERPL CALC-SCNC: 4 MEQ/L (ref 2–13)
BUN SERPL-MCNC: 17 MG/DL (ref 7–20)
CALCIUM SERPL-MCNC: 8.2 MG/DL (ref 8.4–10.2)
CHLORIDE SERPL-SCNC: 96 MMOL/L (ref 98–110)
CO2 SERPL-SCNC: 36 MMOL/L (ref 21–32)
CREAT SERPL-MCNC: 1.19 MG/DL (ref 0.66–1.25)
CREAT/UREA NIT SERPL: 14 (ref 12–20)
ERYTHROCYTE [DISTWIDTH] IN BLOOD BY AUTOMATED COUNT: 18.9 %
GFR SERPLBLD CREATININE-BSD FMLA CKD-EPI: 72 MLS/MIN/1.73/M2
GLUCOSE SERPL-MCNC: 174 MG/DL (ref 70–115)
GRAM STN SPEC: NORMAL
HCT VFR BLD AUTO: 26.4 % (ref 36–52)
HGB BLD-MCNC: 8.3 G/DL (ref 13–18)
MCH RBC QN AUTO: 26.3 PG (ref 27–34)
MCHC RBC AUTO-ENTMCNC: 31.4 G/DL (ref 31–37)
MCV RBC AUTO: 83.8 FL (ref 79–99)
NRBC BLD AUTO-RTO: 0 %
PLATELET # BLD AUTO: 408 X10(3)/MCL (ref 140–371)
PMV BLD AUTO: 11 FL (ref 9.4–12.4)
POCT GLUCOSE: 144 MG/DL (ref 70–110)
POCT GLUCOSE: 198 MG/DL (ref 70–110)
POTASSIUM SERPL-SCNC: 3.1 MMOL/L (ref 3.5–5.1)
RBC # BLD AUTO: 3.15 X10(6)/MCL (ref 4–6)
SARS-COV-2 RDRP RESP QL NAA+PROBE: NEGATIVE
SODIUM SERPL-SCNC: 136 MMOL/L (ref 135–145)
WBC # SPEC AUTO: 9.3 X10(3)/MCL (ref 4–11.5)

## 2023-05-25 PROCEDURE — 63600175 PHARM REV CODE 636 W HCPCS: Performed by: FAMILY MEDICINE

## 2023-05-25 PROCEDURE — 25000003 PHARM REV CODE 250: Performed by: FAMILY MEDICINE

## 2023-05-25 PROCEDURE — 36415 COLL VENOUS BLD VENIPUNCTURE: CPT | Performed by: FAMILY MEDICINE

## 2023-05-25 PROCEDURE — 85027 COMPLETE CBC AUTOMATED: CPT | Performed by: FAMILY MEDICINE

## 2023-05-25 PROCEDURE — A4216 STERILE WATER/SALINE, 10 ML: HCPCS | Performed by: FAMILY MEDICINE

## 2023-05-25 PROCEDURE — 87635 SARS-COV-2 COVID-19 AMP PRB: CPT | Performed by: FAMILY MEDICINE

## 2023-05-25 PROCEDURE — 80048 BASIC METABOLIC PNL TOTAL CA: CPT | Performed by: FAMILY MEDICINE

## 2023-05-25 PROCEDURE — 94761 N-INVAS EAR/PLS OXIMETRY MLT: CPT

## 2023-05-25 RX ORDER — FENTANYL CITRATE 50 UG/ML
50 INJECTION, SOLUTION INTRAMUSCULAR; INTRAVENOUS
Status: CANCELLED | OUTPATIENT
Start: 2023-05-25

## 2023-05-25 RX ORDER — PROCHLORPERAZINE EDISYLATE 5 MG/ML
10 INJECTION INTRAMUSCULAR; INTRAVENOUS EVERY 6 HOURS PRN
Status: CANCELLED | OUTPATIENT
Start: 2023-05-25

## 2023-05-25 RX ORDER — FUROSEMIDE 10 MG/ML
20 INJECTION INTRAMUSCULAR; INTRAVENOUS
Status: CANCELLED | OUTPATIENT
Start: 2023-05-25

## 2023-05-25 RX ORDER — SODIUM CHLORIDE 0.9 % (FLUSH) 0.9 %
10 SYRINGE (ML) INJECTION EVERY 6 HOURS
Status: CANCELLED | OUTPATIENT
Start: 2023-05-25

## 2023-05-25 RX ORDER — ACETAMINOPHEN 325 MG/1
650 TABLET ORAL EVERY 8 HOURS PRN
Status: CANCELLED | OUTPATIENT
Start: 2023-05-25

## 2023-05-25 RX ORDER — ONDANSETRON 2 MG/ML
4 INJECTION INTRAMUSCULAR; INTRAVENOUS EVERY 6 HOURS PRN
Status: CANCELLED | OUTPATIENT
Start: 2023-05-25

## 2023-05-25 RX ORDER — HYDRALAZINE HYDROCHLORIDE 20 MG/ML
10 INJECTION INTRAMUSCULAR; INTRAVENOUS EVERY 4 HOURS PRN
Status: CANCELLED | OUTPATIENT
Start: 2023-05-25

## 2023-05-25 RX ORDER — GLUCAGON 1 MG
1 KIT INJECTION
Status: CANCELLED | OUTPATIENT
Start: 2023-05-25

## 2023-05-25 RX ORDER — FAMOTIDINE 20 MG/1
20 TABLET, FILM COATED ORAL
Status: CANCELLED | OUTPATIENT
Start: 2023-05-25

## 2023-05-25 RX ORDER — POTASSIUM CHLORIDE 20 MEQ/1
40 TABLET, EXTENDED RELEASE ORAL DAILY
Status: CANCELLED | OUTPATIENT
Start: 2023-05-26

## 2023-05-25 RX ORDER — TALC
6 POWDER (GRAM) TOPICAL NIGHTLY PRN
Status: CANCELLED | OUTPATIENT
Start: 2023-05-25

## 2023-05-25 RX ORDER — SODIUM CHLORIDE 0.9 % (FLUSH) 0.9 %
10 SYRINGE (ML) INJECTION
Status: CANCELLED | OUTPATIENT
Start: 2023-05-25

## 2023-05-25 RX ORDER — INSULIN ASPART 100 [IU]/ML
0-5 INJECTION, SOLUTION INTRAVENOUS; SUBCUTANEOUS EVERY 6 HOURS PRN
Status: CANCELLED | OUTPATIENT
Start: 2023-05-25

## 2023-05-25 RX ADMIN — COLLAGENASE SANTYL: 250 OINTMENT TOPICAL at 09:05

## 2023-05-25 RX ADMIN — PIPERACILLIN AND TAZOBACTAM 4.5 G: 4; .5 INJECTION, POWDER, FOR SOLUTION INTRAVENOUS; PARENTERAL at 08:05

## 2023-05-25 RX ADMIN — FUROSEMIDE 20 MG: 10 INJECTION, SOLUTION INTRAVENOUS at 08:05

## 2023-05-25 RX ADMIN — SODIUM CHLORIDE, PRESERVATIVE FREE 10 ML: 5 INJECTION INTRAVENOUS at 12:05

## 2023-05-25 RX ADMIN — PIPERACILLIN AND TAZOBACTAM 4.5 G: 4; .5 INJECTION, POWDER, FOR SOLUTION INTRAVENOUS; PARENTERAL at 12:05

## 2023-05-25 RX ADMIN — POTASSIUM CHLORIDE 40 MEQ: 20 TABLET, EXTENDED RELEASE ORAL at 08:05

## 2023-05-25 RX ADMIN — SODIUM CHLORIDE, PRESERVATIVE FREE 10 ML: 5 INJECTION INTRAVENOUS at 06:05

## 2023-05-25 NOTE — NURSING
REPORT CALLED TO NURSE SHOAIB AT [facility] EXT. CARE WITH  UNDERSTANDING OF ALL DISCHARGE INSTRUCTIONS EXPRESSED.     [facility] [facility] SERVICES ARRANGED FOR TRANSPORTATION OF PATIENT.     COMFORT/SAFETY OF PATIENT INTACT AT THIS TIME. OBSERVATION CONT.     Yanira Pisano

## 2023-05-25 NOTE — DISCHARGE SUMMARY
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 signed by: Gregor Rodriguez Date:    05/16/2023 Time:    19:29    US Lower Extrem Arteries Bilat with JAMAL (xpd)    Result Date: 5/17/2023  EXAMINATION: STUDY: US ARTERIAL LOWER EXTREMITY BILAT WITH JAMAL (XPD) CLINICAL HISTORY AND TECHNIQUE: Radha Oliver, RT on 5/16/2023  7:36 PM ER CLINICAL HX:CHRONIC RT FOOT INFECTION PMH:DIABETIC, LEFT BKA TECH:2D VASCULAR ARTERIAL US OF BILAT LOWER EXT WITH COLOR FLOW AND DOPPLER US TECH:PLP COMPARISON: None FINDINGS: Right lower extremity: External iliac artery: Peak systolic flow velocity is 212 cm/s with multiphasic waveforms. Common femoral artery: Peak systolic flow velocity is 209 cm/s with multiphasic waveforms. Deep femoral artery: Peak systolic flow velocity is 140 cm/s with multiphasic waveforms. Superficial femoral artery (proximal): Peak systolic flow velocity is 158 cm/s with multiphasic waveforms. Superficial femoral artery (middle): Peak systolic flow velocity is 133 cm/s with multiphasic waveforms. Superficial femoral artery (distal): Peak systolic flow velocity is 114 cm/s with multiphasic waveforms. Popliteal artery: Peak systolic flow velocity is 151 cm/s with monophasic, continuous flow waveforms with an end-diastolic flow velocity of 27 cm per 2nd. Peroneal artery: Peak systolic flow velocity is 212 cm/s with monophasic, continuous flow waveforms with an  end-diastolic flow velocity of 52 centimeters/second. Posterior tibial artery: Peak systolic flow velocity is 102 cm/s with monophasic, continuous flow waveforms with an end-diastolic flow velocity of 12 centimeters/second. Anterior tibial artery: Peak systolic flow velocity is 101 cm/s with monophasic, continuous flow waveforms with an end-diastolic flow velocity of 30 centimeters/second. Dorsalis pedis artery: Peak systolic flow velocity is 46 cm/s with monophasic, continuous flow waveforms with an end-diastolic flow velocity of 15 centimeters/second. Incidentally noted is a 3 cm lymph node with a fatty hilum and no worrisome perfusion on color-flow mapping within the right inguinal region. Left lower extremity: External iliac artery: Peak systolic flow velocity is 167 cm/s with multiphasic waveforms. Common femoral artery: Peak systolic flow velocity is 167 cm/s with multiphasic waveforms. Deep femoral artery: Peak systolic flow velocity is 107 cm/s with multiphasic waveforms. Superficial femoral artery (proximal): Peak systolic flow velocity is 120 cm/s with multiphasic waveforms. Superficial femoral artery (middle): Peak systolic flow velocity is 119 cm/s with multiphasic waveforms. Superficial femoral artery (distal): Peak systolic flow velocity is 90 cm/s with multiphasic waveforms. Popliteal artery: Peak systolic flow velocity is 59 cm/s with multiphasic waveforms. Peroneal artery: Peak systolic flow velocity is 98 cm/s with multiphasic waveforms. The patient is post left-sided BKA.     1. The patient is post left-sided BKA. 2. Abnormal waveforms are noted within the arterial vasculature of the right lower extremity at and distal to popliteal artery suggesting a high probability of diffuse atherosclerotic plaquing with numerous short segmental stenotic segments of at least 50-70%. 3. A 3 cm lymph node is noted within the right inguinal region. Electronically signed by: Rodrigo Silveira Date:    05/17/2023  Time:    04:23

## 2023-05-25 NOTE — PLAN OF CARE
Physician ordered to discharge patient to [facility] .  [facility] was notified of pt's discharge orders. Nursing advised to call report to Ke[facility] @ [phone].

## 2023-05-25 NOTE — PLAN OF CARE
Problem: Adult Inpatient Plan of Care  Goal: Plan of Care Review  5/25/2023 1157 by Yanira Pisano RN  Outcome: Ongoing, Progressing  5/25/2023 1048 by Yanira Pisano RN  Outcome: Ongoing, Progressing  Goal: Patient-Specific Goal (Individualized)  5/25/2023 1157 by Yanira Pisano RN  Outcome: Ongoing, Progressing  5/25/2023 1048 by Yanira Pisano RN  Outcome: Ongoing, Progressing  Goal: Absence of Hospital-Acquired Illness or Injury  5/25/2023 1157 by Yanira Pisano RN  Outcome: Ongoing, Progressing  5/25/2023 1048 by Yanira Pisano RN  Outcome: Ongoing, Progressing  Goal: Optimal Comfort and Wellbeing  5/25/2023 1157 by Yanira Pisano RN  Outcome: Ongoing, Progressing  5/25/2023 1048 by Yanira Pisano RN  Outcome: Ongoing, Progressing  Goal: Readiness for Transition of Care  5/25/2023 1157 by Yanira Pisano RN  Outcome: Ongoing, Progressing  5/25/2023 1048 by Yanira Pisano RN  Outcome: Ongoing, Progressing     Problem: Impaired Wound Healing  Goal: Optimal Wound Healing  5/25/2023 1157 by Yanira Pisano RN  Outcome: Ongoing, Progressing  5/25/2023 1048 by Yanira Pisano RN  Outcome: Ongoing, Progressing     Problem: Fall Injury Risk  Goal: Absence of Fall and Fall-Related Injury  Outcome: Ongoing, Progressing     Problem: Pain Acute  Goal: Acceptable Pain Control and Functional Ability  5/25/2023 1157 by Yanira Pisano RN  Outcome: Ongoing, Progressing  5/25/2023 1048 by Yanira Pisano RN  Outcome: Ongoing, Progressing     Problem: Infection  Goal: Absence of Infection Signs and Symptoms  5/25/2023 1157 by Yanira Pisano RN  Outcome: Ongoing, Progressing  5/25/2023 1048 by Yanira Pisano RN  Outcome: Ongoing, Progressing     Problem: Diabetes Comorbidity  Goal: Blood Glucose Level Within Targeted Range  5/25/2023 1157 by Yanira Pisano RN  Outcome: Ongoing, Progressing  5/25/2023 1048 by Yanira Pisano RN  Outcome: Ongoing, Progressing     Problem: Skin Injury Risk Increased  Goal: Skin Health and Integrity  5/25/2023 1157 by Yanira Pisano  RN  Outcome: Ongoing, Progressing  5/25/2023 1048 by Yanira Pisano RN  Outcome: Ongoing, Progressing

## 2023-05-25 NOTE — PLAN OF CARE
Problem: Adult Inpatient Plan of Care  Goal: Plan of Care Review  Outcome: Ongoing, Progressing  Goal: Patient-Specific Goal (Individualized)  Outcome: Ongoing, Progressing  Goal: Absence of Hospital-Acquired Illness or Injury  Outcome: Ongoing, Progressing  Goal: Optimal Comfort and Wellbeing  Outcome: Ongoing, Progressing  Goal: Readiness for Transition of Care  Outcome: Ongoing, Progressing     Problem: Impaired Wound Healing  Goal: Optimal Wound Healing  Outcome: Ongoing, Progressing     Problem: Pain Acute  Goal: Acceptable Pain Control and Functional Ability  Outcome: Ongoing, Progressing     Problem: Infection  Goal: Absence of Infection Signs and Symptoms  Outcome: Ongoing, Progressing     Problem: Diabetes Comorbidity  Goal: Blood Glucose Level Within Targeted Range  Outcome: Ongoing, Progressing     Problem: Skin Injury Risk Increased  Goal: Skin Health and Integrity  Outcome: Ongoing, Progressing

## 2023-05-28 LAB
BACTERIA SPEC ANAEROBE CULT: ABNORMAL
BACTERIA SPEC ANAEROBE CULT: ABNORMAL
BACTERIA SPEC CULT: ABNORMAL
BACTERIA SPEC CULT: ABNORMAL

## 2023-06-08 LAB — PATH INTP: NORMAL
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
## 2023-08-28 PROBLEM — J96.02 ACUTE RESPIRATORY FAILURE WITH HYPOXIA AND HYPERCARBIA: Status: RESOLVED | Noted: 2023-05-22 | Resolved: 2023-08-28

## 2023-08-28 PROBLEM — J96.01 ACUTE RESPIRATORY FAILURE WITH HYPOXIA AND HYPERCARBIA: Status: RESOLVED | Noted: 2023-05-22 | Resolved: 2023-08-28

## 2024-04-01 ENCOUNTER — TELEPHONE (OUTPATIENT)
Dept: FAMILY MEDICINE | Facility: CLINIC | Age: 59
End: 2024-04-01
Payer: MEDICAID

## 2024-08-06 ENCOUNTER — HOSPITAL ENCOUNTER (INPATIENT)
Facility: HOSPITAL | Age: 59
LOS: 8 days | Discharge: NURSING, REHAB OR LONG-TERM CARE | DRG: 854 | End: 2024-08-14
Attending: INTERNAL MEDICINE | Admitting: INTERNAL MEDICINE
Payer: MEDICAID

## 2024-08-06 DIAGNOSIS — E11.621 DIABETIC ULCER OF RIGHT HEEL: ICD-10-CM

## 2024-08-06 DIAGNOSIS — L97.519 DIABETIC ULCER OF RIGHT FOOT ASSOCIATED WITH TYPE 2 DIABETES MELLITUS, UNSPECIFIED PART OF FOOT, UNSPECIFIED ULCER STAGE: ICD-10-CM

## 2024-08-06 DIAGNOSIS — A41.9 SEPSIS, DUE TO UNSPECIFIED ORGANISM, UNSPECIFIED WHETHER ACUTE ORGAN DYSFUNCTION PRESENT: ICD-10-CM

## 2024-08-06 DIAGNOSIS — L97.412 NON-PRESSURE CHRONIC ULCER OF RIGHT HEEL AND MIDFOOT WITH FAT LAYER EXPOSED: ICD-10-CM

## 2024-08-06 DIAGNOSIS — A41.9 SEPSIS WITH ACUTE ORGAN DYSFUNCTION, DUE TO UNSPECIFIED ORGANISM, UNSPECIFIED ORGAN DYSFUNCTION TYPE, UNSPECIFIED WHETHER SEPTIC SHOCK PRESENT: ICD-10-CM

## 2024-08-06 DIAGNOSIS — I50.9 CONGESTIVE HEART FAILURE, UNSPECIFIED HF CHRONICITY, UNSPECIFIED HEART FAILURE TYPE: ICD-10-CM

## 2024-08-06 DIAGNOSIS — D62 ACUTE BLOOD LOSS ANEMIA: ICD-10-CM

## 2024-08-06 DIAGNOSIS — R50.9 FEVER: ICD-10-CM

## 2024-08-06 DIAGNOSIS — R65.20 SEPSIS WITH ACUTE ORGAN DYSFUNCTION, DUE TO UNSPECIFIED ORGANISM, UNSPECIFIED ORGAN DYSFUNCTION TYPE, UNSPECIFIED WHETHER SEPTIC SHOCK PRESENT: ICD-10-CM

## 2024-08-06 DIAGNOSIS — R07.9 CHEST PAIN: ICD-10-CM

## 2024-08-06 DIAGNOSIS — L97.419 DIABETIC ULCER OF RIGHT HEEL: ICD-10-CM

## 2024-08-06 DIAGNOSIS — M86.9 OSTEOMYELITIS: ICD-10-CM

## 2024-08-06 DIAGNOSIS — E11.621 DIABETIC ULCER OF RIGHT FOOT ASSOCIATED WITH TYPE 2 DIABETES MELLITUS, UNSPECIFIED PART OF FOOT, UNSPECIFIED ULCER STAGE: ICD-10-CM

## 2024-08-06 DIAGNOSIS — D64.9 ANEMIA, UNSPECIFIED TYPE: Primary | ICD-10-CM

## 2024-08-06 PROBLEM — E83.42 HYPOMAGNESEMIA: Status: ACTIVE | Noted: 2024-08-06

## 2024-08-06 PROBLEM — E87.1 HYPONATREMIA: Status: ACTIVE | Noted: 2024-08-06

## 2024-08-06 PROBLEM — R53.1 GENERALIZED WEAKNESS: Status: ACTIVE | Noted: 2024-08-06

## 2024-08-06 PROBLEM — E87.20 LACTIC ACIDOSIS: Status: ACTIVE | Noted: 2024-08-06

## 2024-08-06 PROBLEM — E11.9 TYPE II DIABETES MELLITUS: Status: ACTIVE | Noted: 2024-08-06

## 2024-08-06 PROBLEM — L03.115 CELLULITIS OF RIGHT FOOT: Status: ACTIVE | Noted: 2024-08-06

## 2024-08-06 LAB
ABO + RH BLD: NORMAL
ABO + RH BLD: NORMAL
ABO AND RH: NORMAL
ALBUMIN SERPL-MCNC: 2.8 G/DL (ref 3.4–5)
ALBUMIN/GLOB SERPL: 0.7 RATIO
ALP SERPL-CCNC: 264 UNIT/L (ref 50–144)
ALT SERPL-CCNC: 19 UNIT/L (ref 1–45)
ANION GAP SERPL CALC-SCNC: 9 MEQ/L (ref 2–13)
ANISOCYTOSIS BLD QL SMEAR: ABNORMAL
ANTIBODY SCREEN: NORMAL
AST SERPL-CCNC: 29 UNIT/L (ref 17–59)
BACTERIA #/AREA URNS AUTO: ABNORMAL /HPF
BASOPHILS # BLD AUTO: 0.02 X10(3)/MCL (ref 0.01–0.08)
BASOPHILS NFR BLD AUTO: 0.1 % (ref 0.1–1.2)
BILIRUB SERPL-MCNC: 0.5 MG/DL (ref 0–1)
BILIRUB UR QL STRIP.AUTO: NEGATIVE
BLD PROD TYP BPU: NORMAL
BLD PROD TYP BPU: NORMAL
BLOOD UNIT EXPIRATION DATE: NORMAL
BLOOD UNIT EXPIRATION DATE: NORMAL
BLOOD UNIT TYPE CODE: 6200
BLOOD UNIT TYPE CODE: 6200
BNP BLD-MCNC: 4820 PG/ML (ref 0–124.9)
BUN SERPL-MCNC: 20 MG/DL (ref 7–20)
CALCIUM SERPL-MCNC: 8.4 MG/DL (ref 8.4–10.2)
CHLORIDE SERPL-SCNC: 101 MMOL/L (ref 98–110)
CK SERPL-CCNC: 29 U/L (ref 55–170)
CLARITY UR: CLEAR
CO2 SERPL-SCNC: 20 MMOL/L (ref 21–32)
COLOR UR AUTO: YELLOW
CREAT SERPL-MCNC: 1.09 MG/DL (ref 0.66–1.25)
CREAT/UREA NIT SERPL: 18 (ref 12–20)
CROSSMATCH INTERPRETATION: NORMAL
CROSSMATCH INTERPRETATION: NORMAL
CRP SERPL-MCNC: 8.3 MG/DL
DISPENSE STATUS: NORMAL
DISPENSE STATUS: NORMAL
ELLIPTOCYTOSIS (OHS): SLIGHT
EOSINOPHIL # BLD AUTO: 0.04 X10(3)/MCL (ref 0.04–0.54)
EOSINOPHIL NFR BLD AUTO: 0.3 % (ref 0.7–7)
ERYTHROCYTE [DISTWIDTH] IN BLOOD BY AUTOMATED COUNT: 24.3 %
ERYTHROCYTE [SEDIMENTATION RATE] IN BLOOD: 28 MM/HR (ref 0–15)
FERRITIN SERPL-MCNC: 724 NG/ML (ref 17.9–464)
GFR SERPLBLD CREATININE-BSD FMLA CKD-EPI: 80 ML/MIN/1.73/M2
GLOBULIN SER-MCNC: 4.1 GM/DL (ref 2–3.9)
GLUCOSE SERPL-MCNC: 188 MG/DL (ref 70–115)
GLUCOSE UR QL STRIP: NEGATIVE
HCT VFR BLD AUTO: 17.7 % (ref 36–52)
HGB BLD-MCNC: 5.1 G/DL (ref 13–18)
HGB UR QL STRIP: ABNORMAL
HYPOCHROMIA BLD QL SMEAR: ABNORMAL
IMM GRANULOCYTES # BLD AUTO: 0.04 X10(3)/MCL (ref 0–0.03)
IMM GRANULOCYTES NFR BLD AUTO: 0.3 % (ref 0–0.5)
IRON SATN MFR SERPL: 16 % (ref 20–50)
IRON SERPL-MCNC: 21 UG/DL (ref 65–175)
KETONES UR QL STRIP: NEGATIVE
LACTATE SERPL-SCNC: 1.6 MMOL/L (ref 0.4–2)
LACTATE SERPL-SCNC: 2.3 MMOL/L (ref 0.4–2)
LEUKOCYTE ESTERASE UR QL STRIP: NEGATIVE
LYMPHOCYTES # BLD AUTO: 0.56 X10(3)/MCL (ref 1.32–3.57)
LYMPHOCYTES NFR BLD AUTO: 4.2 % (ref 20–55)
MAGNESIUM SERPL-MCNC: 1.7 MG/DL (ref 1.8–2.4)
MCH RBC QN AUTO: 21.9 PG (ref 27–34)
MCHC RBC AUTO-ENTMCNC: 28.8 G/DL (ref 31–37)
MCV RBC AUTO: 76 FL (ref 79–99)
MICROCYTES BLD QL SMEAR: SLIGHT
MONOCYTES # BLD AUTO: 0.92 X10(3)/MCL (ref 0.3–0.82)
MONOCYTES NFR BLD AUTO: 6.8 % (ref 4.7–12.5)
NEUTROPHILS # BLD AUTO: 11.91 X10(3)/MCL (ref 1.78–5.38)
NEUTROPHILS NFR BLD AUTO: 88.3 % (ref 37–73)
NITRITE UR QL STRIP: NEGATIVE
PH UR STRIP: 6 [PH]
PLATELET # BLD AUTO: 686 X10(3)/MCL (ref 140–371)
PLATELET # BLD EST: ADEQUATE 10*3/UL
PMV BLD AUTO: ABNORMAL FL
POIKILOCYTOSIS BLD QL SMEAR: ABNORMAL
POTASSIUM SERPL-SCNC: 4.5 MMOL/L (ref 3.5–5.1)
PROT SERPL-MCNC: 6.9 GM/DL (ref 6.3–8.2)
PROT UR QL STRIP: ABNORMAL
RBC # BLD AUTO: 2.33 X10(6)/MCL (ref 4–6)
RBC #/AREA URNS AUTO: ABNORMAL /HPF
RBC MORPH BLD: ABNORMAL
SODIUM SERPL-SCNC: 130 MMOL/L (ref 136–145)
SP GR UR STRIP.AUTO: 1.01 (ref 1–1.03)
SPECIMEN OUTDATE: NORMAL
SQUAMOUS #/AREA URNS AUTO: ABNORMAL /HPF
TIBC SERPL-MCNC: 108 UG/DL (ref 69–240)
TIBC SERPL-MCNC: 129 UG/DL (ref 250–450)
TRANSFERRIN SERPL-MCNC: 120 MG/DL (ref 174–364)
TROPONIN I SERPL-MCNC: <0.012 NG/ML (ref 0–0.03)
TROPONIN I SERPL-MCNC: <0.012 NG/ML (ref 0–0.03)
UNIT NUMBER: NORMAL
UNIT NUMBER: NORMAL
URATE CRY UR QL COMP ASSIST: ABNORMAL
UROBILINOGEN UR STRIP-ACNC: 2
WBC # BLD AUTO: 13.49 X10(3)/MCL (ref 4–11.5)
WBC #/AREA URNS AUTO: ABNORMAL /HPF

## 2024-08-06 PROCEDURE — 83036 HEMOGLOBIN GLYCOSYLATED A1C: CPT | Performed by: INTERNAL MEDICINE

## 2024-08-06 PROCEDURE — 21400001 HC TELEMETRY ROOM

## 2024-08-06 PROCEDURE — 80053 COMPREHEN METABOLIC PANEL: CPT | Performed by: INTERNAL MEDICINE

## 2024-08-06 PROCEDURE — 25000003 PHARM REV CODE 250: Performed by: INTERNAL MEDICINE

## 2024-08-06 PROCEDURE — 96367 TX/PROPH/DG ADDL SEQ IV INF: CPT

## 2024-08-06 PROCEDURE — 86900 BLOOD TYPING SEROLOGIC ABO: CPT | Performed by: INTERNAL MEDICINE

## 2024-08-06 PROCEDURE — 83550 IRON BINDING TEST: CPT | Performed by: INTERNAL MEDICINE

## 2024-08-06 PROCEDURE — 63600175 PHARM REV CODE 636 W HCPCS: Performed by: INTERNAL MEDICINE

## 2024-08-06 PROCEDURE — 83735 ASSAY OF MAGNESIUM: CPT | Performed by: INTERNAL MEDICINE

## 2024-08-06 PROCEDURE — 86901 BLOOD TYPING SEROLOGIC RH(D): CPT | Performed by: INTERNAL MEDICINE

## 2024-08-06 PROCEDURE — 87070 CULTURE OTHR SPECIMN AEROBIC: CPT | Performed by: INTERNAL MEDICINE

## 2024-08-06 PROCEDURE — 83540 ASSAY OF IRON: CPT | Performed by: INTERNAL MEDICINE

## 2024-08-06 PROCEDURE — 36430 TRANSFUSION BLD/BLD COMPNT: CPT

## 2024-08-06 PROCEDURE — 82962 GLUCOSE BLOOD TEST: CPT

## 2024-08-06 PROCEDURE — 86923 COMPATIBILITY TEST ELECTRIC: CPT | Performed by: INTERNAL MEDICINE

## 2024-08-06 PROCEDURE — 36415 COLL VENOUS BLD VENIPUNCTURE: CPT | Performed by: INTERNAL MEDICINE

## 2024-08-06 PROCEDURE — 87186 SC STD MICRODIL/AGAR DIL: CPT | Performed by: INTERNAL MEDICINE

## 2024-08-06 PROCEDURE — 11000001 HC ACUTE MED/SURG PRIVATE ROOM

## 2024-08-06 PROCEDURE — 83880 ASSAY OF NATRIURETIC PEPTIDE: CPT | Performed by: INTERNAL MEDICINE

## 2024-08-06 PROCEDURE — 82728 ASSAY OF FERRITIN: CPT | Performed by: INTERNAL MEDICINE

## 2024-08-06 PROCEDURE — 82550 ASSAY OF CK (CPK): CPT | Performed by: INTERNAL MEDICINE

## 2024-08-06 PROCEDURE — 81003 URINALYSIS AUTO W/O SCOPE: CPT | Performed by: INTERNAL MEDICINE

## 2024-08-06 PROCEDURE — 30233N1 TRANSFUSION OF NONAUTOLOGOUS RED BLOOD CELLS INTO PERIPHERAL VEIN, PERCUTANEOUS APPROACH: ICD-10-PCS | Performed by: FAMILY MEDICINE

## 2024-08-06 PROCEDURE — 96365 THER/PROPH/DIAG IV INF INIT: CPT

## 2024-08-06 PROCEDURE — 93010 ELECTROCARDIOGRAM REPORT: CPT | Mod: ,,, | Performed by: INTERNAL MEDICINE

## 2024-08-06 PROCEDURE — 87040 BLOOD CULTURE FOR BACTERIA: CPT | Performed by: INTERNAL MEDICINE

## 2024-08-06 PROCEDURE — 93005 ELECTROCARDIOGRAM TRACING: CPT

## 2024-08-06 PROCEDURE — 85652 RBC SED RATE AUTOMATED: CPT | Performed by: INTERNAL MEDICINE

## 2024-08-06 PROCEDURE — 99285 EMERGENCY DEPT VISIT HI MDM: CPT | Mod: 25

## 2024-08-06 PROCEDURE — 86140 C-REACTIVE PROTEIN: CPT | Performed by: INTERNAL MEDICINE

## 2024-08-06 PROCEDURE — 96375 TX/PRO/DX INJ NEW DRUG ADDON: CPT

## 2024-08-06 PROCEDURE — 85025 COMPLETE CBC W/AUTO DIFF WBC: CPT | Performed by: INTERNAL MEDICINE

## 2024-08-06 PROCEDURE — 81015 MICROSCOPIC EXAM OF URINE: CPT | Performed by: INTERNAL MEDICINE

## 2024-08-06 PROCEDURE — 84484 ASSAY OF TROPONIN QUANT: CPT | Performed by: INTERNAL MEDICINE

## 2024-08-06 PROCEDURE — 86850 RBC ANTIBODY SCREEN: CPT | Performed by: INTERNAL MEDICINE

## 2024-08-06 PROCEDURE — P9016 RBC LEUKOCYTES REDUCED: HCPCS | Performed by: INTERNAL MEDICINE

## 2024-08-06 PROCEDURE — 83605 ASSAY OF LACTIC ACID: CPT | Performed by: INTERNAL MEDICINE

## 2024-08-06 RX ORDER — ONDANSETRON HYDROCHLORIDE 2 MG/ML
4 INJECTION, SOLUTION INTRAVENOUS
Status: COMPLETED | OUTPATIENT
Start: 2024-08-06 | End: 2024-08-06

## 2024-08-06 RX ORDER — ALUMINUM HYDROXIDE, MAGNESIUM HYDROXIDE, AND SIMETHICONE 1200; 120; 1200 MG/30ML; MG/30ML; MG/30ML
30 SUSPENSION ORAL 4 TIMES DAILY PRN
Status: DISCONTINUED | OUTPATIENT
Start: 2024-08-06 | End: 2024-08-14 | Stop reason: HOSPADM

## 2024-08-06 RX ORDER — NALOXONE HCL 0.4 MG/ML
0.02 VIAL (ML) INJECTION
Status: DISCONTINUED | OUTPATIENT
Start: 2024-08-06 | End: 2024-08-14 | Stop reason: HOSPADM

## 2024-08-06 RX ORDER — ACETAMINOPHEN 325 MG/1
650 TABLET ORAL EVERY 8 HOURS PRN
Status: DISCONTINUED | OUTPATIENT
Start: 2024-08-06 | End: 2024-08-14 | Stop reason: HOSPADM

## 2024-08-06 RX ORDER — KETOROLAC TROMETHAMINE 30 MG/ML
15 INJECTION, SOLUTION INTRAMUSCULAR; INTRAVENOUS EVERY 6 HOURS PRN
Status: ACTIVE | OUTPATIENT
Start: 2024-08-06 | End: 2024-08-09

## 2024-08-06 RX ORDER — MAGNESIUM SULFATE 1 G/100ML
1 INJECTION INTRAVENOUS ONCE
Status: COMPLETED | OUTPATIENT
Start: 2024-08-06 | End: 2024-08-07

## 2024-08-06 RX ORDER — HYDROCODONE BITARTRATE AND ACETAMINOPHEN 500; 5 MG/1; MG/1
TABLET ORAL
Status: DISCONTINUED | OUTPATIENT
Start: 2024-08-06 | End: 2024-08-14 | Stop reason: HOSPADM

## 2024-08-06 RX ORDER — PANTOPRAZOLE SODIUM 40 MG/10ML
40 INJECTION, POWDER, LYOPHILIZED, FOR SOLUTION INTRAVENOUS
Status: COMPLETED | OUTPATIENT
Start: 2024-08-06 | End: 2024-08-06

## 2024-08-06 RX ORDER — SODIUM CHLORIDE 0.9 % (FLUSH) 0.9 %
10 SYRINGE (ML) INJECTION EVERY 12 HOURS PRN
Status: DISCONTINUED | OUTPATIENT
Start: 2024-08-06 | End: 2024-08-14 | Stop reason: HOSPADM

## 2024-08-06 RX ORDER — IBUPROFEN 200 MG
16 TABLET ORAL
Status: DISCONTINUED | OUTPATIENT
Start: 2024-08-06 | End: 2024-08-14 | Stop reason: HOSPADM

## 2024-08-06 RX ORDER — INSULIN ASPART 100 [IU]/ML
0-5 INJECTION, SOLUTION INTRAVENOUS; SUBCUTANEOUS
Status: DISCONTINUED | OUTPATIENT
Start: 2024-08-06 | End: 2024-08-09

## 2024-08-06 RX ORDER — IBUPROFEN 200 MG
24 TABLET ORAL
Status: DISCONTINUED | OUTPATIENT
Start: 2024-08-06 | End: 2024-08-14 | Stop reason: HOSPADM

## 2024-08-06 RX ORDER — BISACODYL 10 MG/1
10 SUPPOSITORY RECTAL DAILY PRN
Status: DISCONTINUED | OUTPATIENT
Start: 2024-08-06 | End: 2024-08-14 | Stop reason: HOSPADM

## 2024-08-06 RX ORDER — ONDANSETRON HYDROCHLORIDE 2 MG/ML
4 INJECTION, SOLUTION INTRAVENOUS EVERY 8 HOURS PRN
Status: DISCONTINUED | OUTPATIENT
Start: 2024-08-06 | End: 2024-08-14 | Stop reason: HOSPADM

## 2024-08-06 RX ORDER — GLUCAGON 1 MG
1 KIT INJECTION
Status: DISCONTINUED | OUTPATIENT
Start: 2024-08-06 | End: 2024-08-14 | Stop reason: HOSPADM

## 2024-08-06 RX ORDER — ACETAMINOPHEN 325 MG/1
650 TABLET ORAL
Status: COMPLETED | OUTPATIENT
Start: 2024-08-06 | End: 2024-08-06

## 2024-08-06 RX ORDER — ENOXAPARIN SODIUM 100 MG/ML
40 INJECTION SUBCUTANEOUS EVERY 24 HOURS
Status: DISCONTINUED | OUTPATIENT
Start: 2024-08-06 | End: 2024-08-14 | Stop reason: HOSPADM

## 2024-08-06 RX ORDER — SODIUM CHLORIDE 9 MG/ML
INJECTION, SOLUTION INTRAVENOUS CONTINUOUS
Status: DISCONTINUED | OUTPATIENT
Start: 2024-08-06 | End: 2024-08-12

## 2024-08-06 RX ORDER — TALC
6 POWDER (GRAM) TOPICAL NIGHTLY PRN
Status: DISCONTINUED | OUTPATIENT
Start: 2024-08-06 | End: 2024-08-14 | Stop reason: HOSPADM

## 2024-08-06 RX ORDER — PROMETHAZINE HYDROCHLORIDE 25 MG/1
25 TABLET ORAL EVERY 6 HOURS PRN
Status: DISCONTINUED | OUTPATIENT
Start: 2024-08-06 | End: 2024-08-14 | Stop reason: HOSPADM

## 2024-08-06 RX ADMIN — MAGNESIUM SULFATE HEPTAHYDRATE 1 G: 1 INJECTION, SOLUTION INTRAVENOUS at 11:08

## 2024-08-06 RX ADMIN — SODIUM CHLORIDE 1000 ML: 9 INJECTION, SOLUTION INTRAVENOUS at 02:08

## 2024-08-06 RX ADMIN — ONDANSETRON 4 MG: 2 INJECTION INTRAMUSCULAR; INTRAVENOUS at 03:08

## 2024-08-06 RX ADMIN — PANTOPRAZOLE SODIUM 40 MG: 40 INJECTION, POWDER, LYOPHILIZED, FOR SOLUTION INTRAVENOUS at 03:08

## 2024-08-06 RX ADMIN — ENOXAPARIN SODIUM 40 MG: 40 INJECTION SUBCUTANEOUS at 08:08

## 2024-08-06 RX ADMIN — VANCOMYCIN HYDROCHLORIDE 1000 MG: 1 INJECTION, POWDER, LYOPHILIZED, FOR SOLUTION INTRAVENOUS at 03:08

## 2024-08-06 RX ADMIN — PIPERACILLIN AND TAZOBACTAM 4.5 G: 4; .5 INJECTION, POWDER, FOR SOLUTION INTRAVENOUS; PARENTERAL at 02:08

## 2024-08-06 RX ADMIN — ACETAMINOPHEN 650 MG: 325 TABLET, FILM COATED ORAL at 02:08

## 2024-08-06 RX ADMIN — VANCOMYCIN HYDROCHLORIDE 500 MG: 500 INJECTION, POWDER, LYOPHILIZED, FOR SOLUTION INTRAVENOUS at 08:08

## 2024-08-06 NOTE — ED PROVIDER NOTES
Encounter Date: 8/6/2024       History     Chief Complaint   Patient presents with    Weakness     Reports to ed via mod entrance and needed help. Pt was in car and is very weak and has a heavily bleeding diabetic wound on the right foot. Foot is in a boot. Reports it has been there for approx 2 weeks but then bleeding had stopped after he got the boot and proceeded to open today and start bleeding again. Pt reports he is very weak and needs excessive assistance to move into ER bed. Left bka.      This is a 58 years old male patient came into the emergency room needing help.  Patient was apparently living in the car and he is extremely in fatigue and he does have heavily bleeding and infected diabetic wound of the right foot.  Patient's foot is in the boot.  Patient is in need of assistance for ambulation into the bed.  Does have history of anemia, CHF, diabetes, hyperlipidemia.  Patient does have history of left below-knee amputation.  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  No fever, chest pain, shortness of breath.            Review of patient's allergies indicates:   Allergen Reactions    Dilaudid [hydromorphone]     Iodinated contrast media      Past Medical History:   Diagnosis Date    Acute osteomyelitis, multiple sites     left lower extremity    Anemia, unspecified     CHF (congestive heart failure)     Diabetes mellitus, type 2     Gas gangrene of extremity     left lower extremity    HLD (hyperlipidemia)     Wet gangrene     right lower extremity     Past Surgical History:   Procedure Laterality Date    FOOT AMPUTATION THROUGH ANKLE Left     FOOT AMPUTATION THROUGH METATARSAL Right 5/24/2023    Procedure: AMPUTATION, FOOT, TRANSMETATARSAL;  Surgeon: Max Duckworth MD;  Location: [facility] OR;  Service: General;  Laterality: Right;  4th and 5th toes    WOUND DEBRIDEMENT Right 5/17/2023    Procedure: DEBRIDEMENT, WOUND;  Surgeon: Max Duckworth MD;  Location: [facility] OR;  Service: General;   Laterality: Right;     Family History   Problem Relation Name Age of Onset    Diabetes Mellitus Sister       Social History     Tobacco Use    Smoking status: Never     Passive exposure: Never    Smokeless tobacco: Never   Substance Use Topics    Alcohol use: Never    Drug use: Never     Review of Systems   Constitutional: Negative.  Negative for fatigue and fever.   HENT: Negative.  Negative for ear pain, facial swelling and hearing loss.    Eyes: Negative.    Respiratory: Negative.  Negative for cough, shortness of breath and stridor.    Cardiovascular: Negative.  Negative for chest pain.   Gastrointestinal: Negative.  Negative for abdominal pain.   Endocrine: Negative.    Genitourinary: Negative.  Negative for flank pain, frequency and scrotal swelling.   Skin:  Positive for rash and wound.   Neurological: Negative.  Negative for dizziness, light-headedness and headaches.   Psychiatric/Behavioral: Negative.     All other systems reviewed and are negative.      Physical Exam     Initial Vitals [08/06/24 1358]   BP Pulse Resp Temp SpO2   (!) 117/58 (!) 123 (!) 30 (!) 103.2 °F (39.6 °C) 100 %      MAP       --         Physical Exam    Nursing note and vitals reviewed.  Constitutional: He appears well-developed and well-nourished.   HENT:   Head: Normocephalic and atraumatic.   Eyes: Conjunctivae and EOM are normal. Pupils are equal, round, and reactive to light.   Neck: Neck supple.   Normal range of motion.  Cardiovascular:  Normal rate, regular rhythm and normal heart sounds.           Pulmonary/Chest: Breath sounds normal.   Abdominal: Abdomen is soft.   Musculoskeletal:      Cervical back: Normal range of motion and neck supple.      Comments: Left leg has prosthetic limb.  Right foot has ulcer and edematous.     Neurological: He is alert and oriented to person, place, and time. GCS score is 15. GCS eye subscore is 4. GCS verbal subscore is 5. GCS motor subscore is 6.   Skin: Skin is warm. Capillary refill takes  less than 2 seconds.   Diabetic foot ulcer in the right foot.  Has postauricular him to the left lower extremity.         ED Course   Procedures  Labs Reviewed   COMPREHENSIVE METABOLIC PANEL - Abnormal       Result Value    Sodium 130 (*)     Potassium 4.5      Chloride 101      CO2 20 (*)     Glucose 188 (*)     Blood Urea Nitrogen 20      Creatinine 1.09      Calcium 8.4      Protein Total 6.9      Albumin 2.8 (*)     Globulin 4.1 (*)     Albumin/Globulin Ratio 0.7      Bilirubin Total 0.5       (*)     ALT 19      AST 29      eGFR 80      Anion Gap 9.0      BUN/Creatinine Ratio 18     NT-PRO NATRIURETIC PEPTIDE - Abnormal    ProBNP 4,820.0 (*)    MAGNESIUM - Abnormal    Magnesium Level 1.70 (*)    CBC WITH DIFFERENTIAL - Abnormal    WBC 13.49 (*)     RBC 2.33 (*)     Hgb 5.1 (*)     Hct 17.7 (*)     MCV 76.0 (*)     MCH 21.9 (*)     MCHC 28.8 (*)     RDW 24.3      Platelet 686 (*)     MPV        Neut % 88.3 (*)     Lymph % 4.2 (*)     Mono % 6.8      Eos % 0.3 (*)     Basophil % 0.1      Lymph # 0.56 (*)     Neut # 11.91 (*)     Mono # 0.92 (*)     Eos # 0.04      Baso # 0.02      IG# 0.04 (*)     IG% 0.3     LACTIC ACID, PLASMA - Abnormal    Lactic Acid Level 2.3 (*)    C-REACTIVE PROTEIN - Abnormal    CRP 8.30 (*)    SEDIMENTATION RATE - Abnormal    Sed Rate 28 (*)    BLOOD SMEAR MICROSCOPIC EXAM ([facility]) - Abnormal    RBC Morph Abnormal (*)     Anisocytosis 1+ (*)     Elliptocytosis Slight (*)     Hypochromasia 1+ (*)     Microcytosis Slight (*)     Poikilocytosis 1+ (*)     Platelets Adequate     TROPONIN I - Normal    Troponin-I <0.012     BLOOD CULTURE [facility]   BLOOD CULTURE [facility]   WOUND CULTURE ([facility])   CBC W/ AUTO DIFFERENTIAL    Narrative:     The following orders were created for panel order CBC auto differential.  Procedure                               Abnormality         Status                     ---------                               -----------         ------                     CBC with  Differential[[id number]]       Abnormal            Edited Result - FINAL      Manual Differential[[id number]]                                                          Please view results for these tests on the individual orders.   FERRITIN   IRON AND TIBC   LACTIC ACID, PLASMA   TYPE & SCREEN    Specimen Outdate 08/09/2024 23:59      ABO and Rh AB POS     PREPARE RBC SOFT          Imaging Results    None          Medications   vancomycin (VANCOCIN) 1,000 mg in D5W 250 mL IVPB (Vial-Mate) (1,000 mg Intravenous New Bag 8/6/24 1505)   0.9%  NaCl infusion (for blood administration) (has no administration in time range)   pantoprazole (PROTONIX) 40 mg in 0.9% NaCl 100 mL IVPB (MB+) (has no administration in time range)   acetaminophen tablet 650 mg (650 mg Oral Given 8/6/24 1409)   piperacillin-tazobactam (ZOSYN) 4.5 g in D5W 100 mL IVPB (MB+) (0 g Intravenous Stopped 8/6/24 1450)   sodium chloride 0.9% bolus 1,000 mL 1,000 mL (1,000 mLs Intravenous New Bag 8/6/24 1415)   ondansetron injection 4 mg (4 mg Intravenous Given 8/6/24 1517)   pantoprazole injection 40 mg (40 mg Intravenous Given 8/6/24 1516)     Medical Decision Making  This is a 58 years old male patient came into the emergency room needing help.  Patient was apparently living in the car and he is extremely in fatigue and he does have heavily bleeding and infected diabetic wound of the right foot.  Patient's foot is in the boot.  Patient is in need of assistance for ambulation into the bed.  Does have history of anemia, CHF, diabetes, hyperlipidemia.  Patient does have history of left below-knee amputation.  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  No fever, chest pain, shortness of breath.    Differential diagnosis: 1. Sepsis 2.  Diabetic foot ulcer 3. Cellulitis right foot 4. Osteomyelitis right foot 5.  Congestive heart failure 6. Severe anemia 7. GI bleed    Amount and/or Complexity of Data Reviewed  Labs: ordered.  Radiology:  ordered.    Risk  OTC drugs.  Prescription drug management.  Decision regarding hospitalization.                                      Clinical Impression:  Final diagnoses:  [R07.9] Chest pain  [R50.9] Fever  [M86.9] Osteomyelitis  [M86.9] Osteomyelitis - Wound on right foot                 Eliot Rivera DO  08/06/24 1739

## 2024-08-07 LAB
ABO + RH BLD: NORMAL
ABO + RH BLD: NORMAL
ALBUMIN SERPL-MCNC: 2.1 G/DL (ref 3.4–5)
ALBUMIN/GLOB SERPL: 0.6 RATIO
ALP SERPL-CCNC: 223 UNIT/L (ref 50–144)
ALT SERPL-CCNC: 11 UNIT/L (ref 1–45)
ANION GAP SERPL CALC-SCNC: 4 MEQ/L (ref 2–13)
ANISOCYTOSIS BLD QL SMEAR: ABNORMAL
AST SERPL-CCNC: 15 UNIT/L (ref 17–59)
BASOPHILS # BLD AUTO: 0.05 X10(3)/MCL (ref 0.01–0.08)
BASOPHILS NFR BLD AUTO: 0.4 % (ref 0.1–1.2)
BILIRUB SERPL-MCNC: 0.6 MG/DL (ref 0–1)
BLD PROD TYP BPU: NORMAL
BLD PROD TYP BPU: NORMAL
BLOOD UNIT EXPIRATION DATE: NORMAL
BLOOD UNIT EXPIRATION DATE: NORMAL
BLOOD UNIT TYPE CODE: 6200
BLOOD UNIT TYPE CODE: 6200
BUN SERPL-MCNC: 16 MG/DL (ref 7–20)
CALCIUM SERPL-MCNC: 8.4 MG/DL (ref 8.4–10.2)
CHLORIDE SERPL-SCNC: 107 MMOL/L (ref 98–110)
CO2 SERPL-SCNC: 24 MMOL/L (ref 21–32)
CREAT SERPL-MCNC: 0.93 MG/DL (ref 0.66–1.25)
CREAT/UREA NIT SERPL: 17 (ref 12–20)
CROSSMATCH INTERPRETATION: NORMAL
CROSSMATCH INTERPRETATION: NORMAL
DISPENSE STATUS: NORMAL
DISPENSE STATUS: NORMAL
ELLIPTOCYTOSIS (OHS): ABNORMAL
EOSINOPHIL # BLD AUTO: 0.17 X10(3)/MCL (ref 0.04–0.54)
EOSINOPHIL NFR BLD AUTO: 1.4 % (ref 0.7–7)
ERYTHROCYTE [DISTWIDTH] IN BLOOD BY AUTOMATED COUNT: 21.2 %
EST. AVERAGE GLUCOSE BLD GHB EST-MCNC: NORMAL MG/DL
GFR SERPLBLD CREATININE-BSD FMLA CKD-EPI: >90 ML/MIN/1.73/M2
GLOBULIN SER-MCNC: 3.6 GM/DL (ref 2–3.9)
GLUCOSE SERPL-MCNC: 109 MG/DL (ref 70–115)
HBA1C MFR BLD: <4 %
HCT VFR BLD AUTO: 20.6 % (ref 36–52)
HGB BLD-MCNC: 6.4 G/DL (ref 13–18)
IMM GRANULOCYTES # BLD AUTO: 0.08 X10(3)/MCL (ref 0–0.03)
IMM GRANULOCYTES NFR BLD AUTO: 0.7 % (ref 0–0.5)
LYMPHOCYTES # BLD AUTO: 0.61 X10(3)/MCL (ref 1.32–3.57)
LYMPHOCYTES NFR BLD AUTO: 5 % (ref 20–55)
MAGNESIUM SERPL-MCNC: 2 MG/DL (ref 1.8–2.4)
MCH RBC QN AUTO: 24.7 PG (ref 27–34)
MCHC RBC AUTO-ENTMCNC: 31.1 G/DL (ref 31–37)
MCV RBC AUTO: 79.5 FL (ref 79–99)
MICROCYTES BLD QL SMEAR: ABNORMAL
MONOCYTES # BLD AUTO: 0.92 X10(3)/MCL (ref 0.3–0.82)
MONOCYTES NFR BLD AUTO: 7.5 % (ref 4.7–12.5)
NEUTROPHILS # BLD AUTO: 10.38 X10(3)/MCL (ref 1.78–5.38)
NEUTROPHILS NFR BLD AUTO: 85 % (ref 37–73)
NRBC BLD AUTO-RTO: 0 %
OHS QRS DURATION: 78 MS
OHS QTC CALCULATION: 442 MS
PLATELET # BLD AUTO: 474 X10(3)/MCL (ref 140–371)
PLATELET # BLD EST: ABNORMAL 10*3/UL
PMV BLD AUTO: ABNORMAL FL
POCT GLUCOSE: 103 MG/DL (ref 70–110)
POCT GLUCOSE: 140 MG/DL (ref 70–110)
POCT GLUCOSE: 150 MG/DL (ref 70–110)
POCT GLUCOSE: 157 MG/DL (ref 70–110)
POCT GLUCOSE: 178 MG/DL (ref 70–110)
POIKILOCYTOSIS BLD QL SMEAR: ABNORMAL
POTASSIUM SERPL-SCNC: 4.2 MMOL/L (ref 3.5–5.1)
PROT SERPL-MCNC: 5.7 GM/DL (ref 6.3–8.2)
RBC # BLD AUTO: 2.59 X10(6)/MCL (ref 4–6)
RBC MORPH BLD: ABNORMAL
SCHISTOCYTE (OLG): ABNORMAL
SODIUM SERPL-SCNC: 135 MMOL/L (ref 136–145)
UNIT NUMBER: NORMAL
UNIT NUMBER: NORMAL
VANCOMYCIN SERPL-MCNC: 6.9 UG/ML (ref 5–10)
WBC # BLD AUTO: 12.21 X10(3)/MCL (ref 4–11.5)

## 2024-08-07 PROCEDURE — 83735 ASSAY OF MAGNESIUM: CPT | Performed by: INTERNAL MEDICINE

## 2024-08-07 PROCEDURE — 21400001 HC TELEMETRY ROOM

## 2024-08-07 PROCEDURE — 85025 COMPLETE CBC W/AUTO DIFF WBC: CPT | Performed by: INTERNAL MEDICINE

## 2024-08-07 PROCEDURE — 80053 COMPREHEN METABOLIC PANEL: CPT | Performed by: INTERNAL MEDICINE

## 2024-08-07 PROCEDURE — 36415 COLL VENOUS BLD VENIPUNCTURE: CPT | Performed by: INTERNAL MEDICINE

## 2024-08-07 PROCEDURE — 63600175 PHARM REV CODE 636 W HCPCS: Performed by: INTERNAL MEDICINE

## 2024-08-07 PROCEDURE — 25000003 PHARM REV CODE 250: Performed by: INTERNAL MEDICINE

## 2024-08-07 PROCEDURE — 36430 TRANSFUSION BLD/BLD COMPNT: CPT

## 2024-08-07 PROCEDURE — 97161 PT EVAL LOW COMPLEX 20 MIN: CPT

## 2024-08-07 PROCEDURE — P9016 RBC LEUKOCYTES REDUCED: HCPCS | Performed by: FAMILY MEDICINE

## 2024-08-07 PROCEDURE — 94761 N-INVAS EAR/PLS OXIMETRY MLT: CPT

## 2024-08-07 PROCEDURE — 80202 ASSAY OF VANCOMYCIN: CPT | Performed by: INTERNAL MEDICINE

## 2024-08-07 PROCEDURE — 86923 COMPATIBILITY TEST ELECTRIC: CPT | Performed by: FAMILY MEDICINE

## 2024-08-07 RX ORDER — FAMOTIDINE 20 MG/1
20 TABLET, FILM COATED ORAL
Status: DISCONTINUED | OUTPATIENT
Start: 2024-08-07 | End: 2024-08-11

## 2024-08-07 RX ORDER — MIDAZOLAM HYDROCHLORIDE 1 MG/ML
2 INJECTION INTRAMUSCULAR; INTRAVENOUS
Status: DISCONTINUED | OUTPATIENT
Start: 2024-08-07 | End: 2024-08-11

## 2024-08-07 RX ORDER — HYDROCODONE BITARTRATE AND ACETAMINOPHEN 500; 5 MG/1; MG/1
TABLET ORAL
Status: DISCONTINUED | OUTPATIENT
Start: 2024-08-07 | End: 2024-08-14 | Stop reason: HOSPADM

## 2024-08-07 RX ORDER — GLYCOPYRROLATE 0.2 MG/ML
0.2 INJECTION INTRAMUSCULAR; INTRAVENOUS
Status: COMPLETED | OUTPATIENT
Start: 2024-08-07 | End: 2024-08-10

## 2024-08-07 RX ADMIN — PIPERACILLIN SODIUM AND TAZOBACTAM SODIUM 4.5 G: 4; .5 INJECTION, POWDER, LYOPHILIZED, FOR SOLUTION INTRAVENOUS at 12:08

## 2024-08-07 RX ADMIN — VANCOMYCIN HYDROCHLORIDE 1250 MG: 1.25 INJECTION, POWDER, LYOPHILIZED, FOR SOLUTION INTRAVENOUS at 09:08

## 2024-08-07 RX ADMIN — PIPERACILLIN SODIUM AND TAZOBACTAM SODIUM 4.5 G: 4; .5 INJECTION, POWDER, LYOPHILIZED, FOR SOLUTION INTRAVENOUS at 05:08

## 2024-08-07 RX ADMIN — PIPERACILLIN SODIUM AND TAZOBACTAM SODIUM 4.5 G: 4; .5 INJECTION, POWDER, LYOPHILIZED, FOR SOLUTION INTRAVENOUS at 11:08

## 2024-08-07 RX ADMIN — VANCOMYCIN HYDROCHLORIDE 1250 MG: 1.25 INJECTION, POWDER, LYOPHILIZED, FOR SOLUTION INTRAVENOUS at 10:08

## 2024-08-07 RX ADMIN — PIPERACILLIN SODIUM AND TAZOBACTAM SODIUM 4.5 G: 4; .5 INJECTION, POWDER, LYOPHILIZED, FOR SOLUTION INTRAVENOUS at 03:08

## 2024-08-07 RX ADMIN — SODIUM CHLORIDE: 9 INJECTION, SOLUTION INTRAVENOUS at 02:08

## 2024-08-07 NOTE — PROGRESS NOTES
"Pharmacokinetic Initial Assessment: IV Vancomycin    Assessment/Plan:    Initiate intravenous vancomycin with loading dose of 1500 mg once with subsequent doses when random concentrations are less than 20 mcg/mL  Desired empiric serum trough concentration is 10 to 15 mcg/mL  Draw vancomycin random level on 8/7 at 08:30.  Pharmacy will continue to follow and monitor vancomycin.      Please contact pharmacy with any questions regarding this assessment.     Thank you for the consult,   Kalpesh Navarro       Patient brief summary:  Honorio Robb is a 58 y.o. male initiated on antimicrobial therapy with IV Vancomycin for treatment of suspected skin & soft tissue infection    Drug Allergies:   Review of patient's allergies indicates:   Allergen Reactions    Dilaudid [hydromorphone]     Iodinated contrast media        Actual Body Weight:   74.8kg    Renal Function:   Estimated Creatinine Clearance: 68.3 mL/min (based on SCr of 1.09 mg/dL).,     Dialysis Method (if applicable):  N/A    CBC (last 72 hours):  Recent Labs   Lab Result Units 08/06/24  1355   WBC x10(3)/mcL 13.49*   Hgb g/dL 5.1*   Hct % 17.7*   Platelet x10(3)/mcL 686*   Mono % % 6.8   Eos % % 0.3*   Basophil % % 0.1       Metabolic Panel (last 72 hours):  Recent Labs   Lab Result Units 08/06/24  1355 08/06/24  1731   Sodium mmol/L 130*  --    Potassium mmol/L 4.5  --    Chloride mmol/L 101  --    CO2 mmol/L 20*  --    Glucose mg/dL 188*  --    Glucose, UA   --  Negative   Blood Urea Nitrogen mg/dL 20  --    Creatinine mg/dL 1.09  --    Albumin g/dL 2.8*  --    Bilirubin Total mg/dL 0.5  --    ALP unit/L 264*  --    AST unit/L 29  --    ALT unit/L 19  --    Magnesium Level mg/dL 1.70*  --        Drug levels (last 3 results):  No results for input(s): "VANCOMYCINRA", "VANCORANDOM", "VANCOMYCINPE", "VANCOPEAK", "VANCOMYCINTR", "VANCOTROUGH" in the last 72 hours.    Microbiologic Results:  Microbiology Results (last 7 days)       Procedure Component Value " Units Date/Time    Blood Culture #2 **CANNOT BE ORDERED STAT** [[id number]] Collected: 08/06/24 1418    Order Status: Resulted Specimen: Blood from Antecubital, Right Updated: 08/06/24 1425    Blood Culture #1 **CANNOT BE ORDERED STAT** [[id number]] Collected: 08/06/24 1355    Order Status: Resulted Specimen: Blood from Arm, Left Updated: 08/06/24 1425    Wound Culture [[id number]] Collected: 08/06/24 1407    Order Status: Sent Specimen: Drainage from Foot, Right Updated: 08/06/24 1424

## 2024-08-07 NOTE — PROGRESS NOTES
[facility]  Progress Note    Patient Name: Honorio Robb  MRN: [id number]  Patient Class: IP- Inpatient   Admission Date: 8/6/2024  Length of Stay: 1 days  Attending Physician: Tahir Ha MD  Primary Care Provider: Winston Lopez MD        Subjective:     Principal Problem:Diabetic ulcer of right heel        HPI:  Mr. Robb is a 58 year old  male with a history of type II diabetes mellitus, HTN, chronic diastolic CHF, and hyperlipidemia who presented to the ER today with a 1 day history of generalized weakness. He was in his normal state of health until 2 weeks ago when he noticed right foot swelling due to a right diabetic heel ulcer. He has had bleeding and drainage from his right foot, fevers, and chills but he denies any chest pain, shortness of breath, nausea, vomiting, or abdominal pain. He developed generalized weakness yesterday which worsened today and he presented to [facility] for further evaluation. Of note, he has a history of chronic right heel ulcer with necrotic wound s/p excisional debridement on 5/17/2023 and transmetatarsal amputation of the 4th and 5th digits on 5/24/2023. On arrival to the ER he was febrile with a temperature of 103.2, tachycardic with a heart rate of 126, and tachypneic with a respiratory rate of 30 and his initial labwork was remarkable for a WBC count of 13.49, lactic acid of 2.3, ESR of 28, CRP of 8.3, hemoglobin of 5.1, hematocrit of 17.7, iron of 221, sodium of 130, glucose of 188, magnesium of 1.7, and BNP of 4820.  His CXR showed a borderline enlarged heart with vascular congestion and mild interstitial edema and x-ray of the right foot revealed diffuse soft tissue swelling and absence of visualization of the 4th and 5th metatarsals as well as the talus with only a small residual of the posterior aspect of the calcaneus remaining and remnants of the midfoot articulations  associated with caudal displacement of the distal aspect of the fibula and tibia with surrounding air lucencies extending from the inferior aspect of the tibia and fibula to the sole of the mid and hindfoot. CT of the abdomen and pelvis demonstrated enlarged inguinal and pelvic lymph nodes haziness to the subcutaneous and adipose tissue which can be seen with generalized edema. He has received vancomycin 500 mg IV and magnesium sulfate 1 gm IV x 1 in the ER. He is otherwise in stable condition and he has no other complaints today.     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  Surgery consulted and plans for debridement today    Interval History:      Review of Systems   Constitutional:  Negative for appetite change, fatigue and fever.   Respiratory:  Negative for cough, shortness of breath and wheezing.    Cardiovascular:  Negative for chest pain and leg swelling.   Gastrointestinal:  Negative for abdominal distention, abdominal pain, constipation, diarrhea, nausea and vomiting.   Skin:  Negative for color change, pallor, rash and wound.   Neurological:  Negative for tremors, syncope and headaches.   Psychiatric/Behavioral:  Negative for agitation and behavioral problems.      Objective:     Vital Signs (Most Recent):  Temp: 99.1 °F (37.3 °C) (08/07/24 1110)  Pulse: 82 (08/07/24 1110)  Resp: 18 (08/07/24 1110)  BP: 129/64 (08/07/24 1110)  SpO2: 100 % (08/07/24 1110) Vital Signs (24h Range):  Temp:  [98.1 °F (36.7 °C)-99.7 °F (37.6 °C)] 99.1 °F (37.3 °C)  Pulse:  [] 82  Resp:  [12-32] 18  SpO2:  [97 %-100 %] 100 %  BP: (104-146)/(52-83) 129/64     Weight: 84.5 kg (186 lb 4.8 oz)  Body mass index is 26.73 kg/m².    Intake/Output Summary (Last 24 hours) at 8/7/2024 1430  Last data filed at 8/7/2024 0550  Gross per 24 hour   Intake 2424 ml   Output 1490 ml   Net 934 ml         Physical Exam  Constitutional:        General: He is not in acute distress.     Appearance: Normal appearance. He is normal weight. He is not ill-appearing.   Cardiovascular:      Rate and Rhythm: Normal rate and regular rhythm.      Pulses: Normal pulses.      Heart sounds: Normal heart sounds.   Pulmonary:      Effort: Pulmonary effort is normal.      Breath sounds: Normal breath sounds.   Abdominal:      General: Abdomen is flat.      Palpations: Abdomen is soft.   Musculoskeletal:      Right lower leg: Edema (right) present.   Skin:     General: Skin is warm and dry.      Findings: Lesion present. No erythema or rash.      Comments: Wound RLE see nurses' notes and photos   Neurological:      General: No focal deficit present.      Mental Status: He is alert and oriented to person, place, and time. Mental status is at baseline.   Psychiatric:         Mood and Affect: Mood normal.         Behavior: Behavior normal.             Significant Labs: All pertinent labs within the past 24 hours have been reviewed.  CBC:   Recent Labs   Lab 08/06/24  1355 08/07/24  0734   WBC 13.49* 12.21*   HGB 5.1* 6.4*   HCT 17.7* 20.6*   * 474*     CMP:   Recent Labs   Lab 08/06/24  1355 08/07/24  0734   * 135*   K 4.5 4.2    107   CO2 20* 24   BUN 20 16   CREATININE 1.09 0.93   CALCIUM 8.4 8.4   ALBUMIN 2.8* 2.1*   BILITOT 0.5 0.6   ALKPHOS 264* 223*   AST 29 15*   ALT 19 11       Significant Imaging: I have reviewed all pertinent imaging results/findings within the past 24 hours.    Assessment/Plan:      * Diabetic ulcer of right heel  An MRI of the right foot will be obtained to rule out osteomyelitis. Consult general surgery for debridement.    Sepsis  Continue vancomycin 15 mg/kg IV every 12 hours, zosyn 3.375 gm IV every 6 hours, and IV fluids. 2 sets of blood cultures have been obtained in the ER.      Acute blood loss anemia  S/p 2 units packed RBCs   Repeat CBC in the morning.   Iron panel from 8/6/2024 was consistent with an iron deficiency  anemia.  Got another 2 U PRBC today    Generalized weakness  Consult PT.    Type II diabetes mellitus  Continue SSI. Hemoglobin A1c    Lab Results   Component Value Date    HGBA1C <4.0 08/06/2024         Hypomagnesemia  improved    Hyponatremia  Continue IV fluids.     Lactic acidosis  Resolved.      Cellulitis of right foot  Continue vancomycin 15 mg/kg IV every 12 hours and zosyn 3.375 gm IV every 6 hours.      VTE Risk Mitigation (From admission, onward)           Ordered     enoxaparin injection 40 mg  Daily         08/06/24 1954     IP VTE HIGH RISK PATIENT  Once         08/06/24 1954                    Discharge Planning   MIRYAM: 8/12/2024     Code Status: Full Code   Is the patient medically ready for discharge?:     Reason for patient still in hospital (select all that apply): Patient trending condition and Treatment  Discharge Plan A: Long-term acute care facility (LTAC)                   Marcella Reyes MD  Department of Hospital Medicine   [facility]

## 2024-08-07 NOTE — SUBJECTIVE & OBJECTIVE
Interval History:      Review of Systems   Constitutional:  Negative for appetite change, fatigue and fever.   Respiratory:  Negative for cough, shortness of breath and wheezing.    Cardiovascular:  Negative for chest pain and leg swelling.   Gastrointestinal:  Negative for abdominal distention, abdominal pain, constipation, diarrhea, nausea and vomiting.   Skin:  Negative for color change, pallor, rash and wound.   Neurological:  Negative for tremors, syncope and headaches.   Psychiatric/Behavioral:  Negative for agitation and behavioral problems.      Objective:     Vital Signs (Most Recent):  Temp: 99.1 °F (37.3 °C) (08/07/24 1110)  Pulse: 82 (08/07/24 1110)  Resp: 18 (08/07/24 1110)  BP: 129/64 (08/07/24 1110)  SpO2: 100 % (08/07/24 1110) Vital Signs (24h Range):  Temp:  [98.1 °F (36.7 °C)-99.7 °F (37.6 °C)] 99.1 °F (37.3 °C)  Pulse:  [] 82  Resp:  [12-32] 18  SpO2:  [97 %-100 %] 100 %  BP: (104-146)/(52-83) 129/64     Weight: 84.5 kg (186 lb 4.8 oz)  Body mass index is 26.73 kg/m².    Intake/Output Summary (Last 24 hours) at 8/7/2024 1430  Last data filed at 8/7/2024 0550  Gross per 24 hour   Intake 2424 ml   Output 1490 ml   Net 934 ml         Physical Exam  Constitutional:       General: He is not in acute distress.     Appearance: Normal appearance. He is normal weight. He is not ill-appearing.   Cardiovascular:      Rate and Rhythm: Normal rate and regular rhythm.      Pulses: Normal pulses.      Heart sounds: Normal heart sounds.   Pulmonary:      Effort: Pulmonary effort is normal.      Breath sounds: Normal breath sounds.   Abdominal:      General: Abdomen is flat.      Palpations: Abdomen is soft.   Musculoskeletal:      Right lower leg: Edema (right) present.   Skin:     General: Skin is warm and dry.      Findings: Lesion present. No erythema or rash.      Comments: Wound RLE see nurses' notes and photos   Neurological:      General: No focal deficit present.      Mental Status: He is alert and  oriented to person, place, and time. Mental status is at baseline.   Psychiatric:         Mood and Affect: Mood normal.         Behavior: Behavior normal.             Significant Labs: All pertinent labs within the past 24 hours have been reviewed.  CBC:   Recent Labs   Lab 08/06/24  1355 08/07/24  0734   WBC 13.49* 12.21*   HGB 5.1* 6.4*   HCT 17.7* 20.6*   * 474*     CMP:   Recent Labs   Lab 08/06/24  1355 08/07/24  0734   * 135*   K 4.5 4.2    107   CO2 20* 24   BUN 20 16   CREATININE 1.09 0.93   CALCIUM 8.4 8.4   ALBUMIN 2.8* 2.1*   BILITOT 0.5 0.6   ALKPHOS 264* 223*   AST 29 15*   ALT 19 11       Significant Imaging: I have reviewed all pertinent imaging results/findings within the past 24 hours.

## 2024-08-07 NOTE — PT/OT/SLP EVAL
"Physical Therapy Evaluation    Patient Name:  Honorio Robb   MRN:  [id number]    Recommendations:     Discharge Recommendations:  (Continue to assess)   Discharge Equipment Recommendations: none   Barriers to discharge:  current medical status    Assessment:     Honorio Robb is a 58 y.o. male admitted with a medical diagnosis of Diabetic ulcer of right heel.  He presents with the following impairments/functional limitations: weakness, impaired functional mobility, impaired balance     Physical therapy evaluation completed. Patient requested not to get up because "they are about to come get me for surgery". 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 Surgery is for debridement of R foot wound. Patient's R lower leg is edematous with noted wounds on plantar surface. Patient reports no pain. Patient able to move L BKA WFL. Noted to have discoloration to front of residual limb, when asked about proper fit of prosthesis, patient reports that it "has been sliding up and down lately and wearing 5 socks". Educated to follow up with prosthetist to possibly be fitted due to recent weight loss. Patient's BLE were WFL. Patient able to perform IR/ER of RLE and slight ankle pumps - limited due to edema.    Rehab Prognosis: Fair; patient would benefit from acute skilled PT services to address these deficits and reach maximum level of function.    Recent Surgery: Procedure(s) (LRB):  DEBRIDEMENT, WOUND (Right)      Plan:     During this hospitalization, patient to be seen 5 x/week (5-6x weekly/1-2x daily) to address the identified rehab impairments via gait training, therapeutic activities, therapeutic exercises and progress toward the following goals:    Plan of Care Expires:  09/06/24    Subjective     Chief Complaint: "It dont hurt"  Patient/Family Comments/goals: to get better  Pain/Comfort:       Patients cultural, spiritual, Mandaen conflicts given the current situation:      Living Environment:  Lives alone while in [place], " Lives with son while in [place]  Prior to admission, patients level of function - 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  Equipment used at home: prosthesis.  DME owned (not currently used): none.  Upon discharge, patient will have assistance from family/self.    Objective:     Communicated with nursing prior to session.  Patient found HOB elevated with peripheral IV  upon PT entry to room.    General Precautions: Standard, fall, contact  Orthopedic Precautions:N/A   Braces: N/A  Respiratory Status: Room air    Exams:  RUE Strength: WFL  LUE Strength: WFL  RLE ROM: able to perform slight IR/ER of RLE and slight ankle pumps - limited by edema  LLE Strength: WFL except does have BKA    Functional Mobility:  Bed Mobility:     Supine to Sit: NT today  Sit to Supine: NT today      AM-PAC 6 CLICK MOBILITY  Total Score:        Treatment & Education:  See above    Patient left HOB elevated with all lines intact, call button in reach, bed alarm on, and nurse notified.    GOALS:   Multidisciplinary Problems       Physical Therapy Goals          Problem: Physical Therapy    Goal Priority Disciplines Outcome Goal Variances Interventions   Physical Therapy Goal     PT, PT/OT Progressing     Description: Goals to be met by: discharge     Patient will increase functional independence with mobility by performin. Supine to sit with Contact Guard Assistance  2. Sit to supine with Contact Guard Assistance  3. Bed to chair transfer with Minimal Assistance using Slideboard                         History:     Past Medical History:   Diagnosis Date    Chronic diastolic CHF (congestive heart failure)     Gas gangrene of left lower extremity     History of osteomyelitis of left lower extremity     Hyperlipidemia     Hypertension     Iron deficiency anemia     Type II diabetes mellitus     Wet gangrene of right lower extremity        Past Surgical History:   Procedure Laterality Date    BELOW THE KNEE AMPUTATION  Left     FOOT AMPUTATION THROUGH ANKLE Left     FOOT AMPUTATION THROUGH METATARSAL Right 05/24/2023    Procedure: AMPUTATION, FOOT, TRANSMETATARSAL;  Surgeon: Max Duckworth MD;  Location: [facility] OR;  Service: General;  Laterality: Right;  4th and 5th toes    WOUND DEBRIDEMENT Right 05/17/2023    Procedure: DEBRIDEMENT, WOUND;  Surgeon: Max Duckworth MD;  Location: [facility] OR;  Service: General;  Laterality: Right;       Time Tracking:     PT Received On: 08/07/24  PT Start Time: 1415     PT Stop Time: 1430  PT Total Time (min): 15 min     Billable Minutes: Evaluation 15      08/07/2024

## 2024-08-07 NOTE — PLAN OF CARE
Problem: Physical Therapy  Goal: Physical Therapy Goal  Description: Goals to be met by: discharge     Patient will increase functional independence with mobility by performin. Supine to sit with Contact Guard Assistance  2. Sit to supine with Contact Guard Assistance  3. Bed to chair transfer with Minimal Assistance using Slideboard    Outcome: Progressing

## 2024-08-07 NOTE — PROGRESS NOTES
Pharmacokinetic Assessment Follow Up: IV Vancomycin    Vancomycin serum concentration assessment(s):    The random level was drawn correctly and can be used to guide therapy at this time. The measurement is below the desired definitive target range of 10 to 15 mcg/mL.    Vancomycin Regimen Plan:    Change regimen to Vancomycin 1250 mg IV every 12 hours with next serum trough concentration measured at 0930 prior to 1030 dose on 08/08/24    Drug levels (last 3 results):  Recent Labs   Lab Result Units 08/07/24  0906   Vancomycin Random ug/ml 6.9       Pharmacy will continue to follow and monitor vancomycin.    Please contact pharmacy at extension [phone] for questions regarding this assessment.    Thank you for the consult,   Tiny Smith       Patient brief summary:  Honorio Robb is a 58 y.o. male initiated on antimicrobial therapy with IV Vancomycin for treatment of skin & soft tissue infection      Drug Allergies:   Review of patient's allergies indicates:   Allergen Reactions    Dilaudid [hydromorphone]     Iodinated contrast media        Actual Body Weight:   84.5kg    Renal Function:   Estimated Creatinine Clearance: 91.6 mL/min (based on SCr of 0.93 mg/dL).,     Dialysis Method (if applicable):  N/A    CBC (last 72 hours):  Recent Labs   Lab Result Units 08/06/24  1355 08/07/24  0734   WBC x10(3)/mcL 13.49* 12.21*   Hgb g/dL 5.1* 6.4*   Hemoglobin A1c % <4.0  --    Hct % 17.7* 20.6*   Platelet x10(3)/mcL 686* 474*   Mono % % 6.8 7.5   Eos % % 0.3* 1.4   Basophil % % 0.1 0.4       Metabolic Panel (last 72 hours):  Recent Labs   Lab Result Units 08/06/24  1355 08/06/24  1731 08/07/24  0734   Sodium mmol/L 130*  --  135*   Potassium mmol/L 4.5  --  4.2   Chloride mmol/L 101  --  107   CO2 mmol/L 20*  --  24   Glucose mg/dL 188*  --  109   Glucose, UA   --  Negative  --    Blood Urea Nitrogen mg/dL 20  --  16   Creatinine mg/dL 1.09  --  0.93   Albumin g/dL 2.8*  --  2.1*   Bilirubin Total mg/dL 0.5  --  0.6   ALP  unit/L 264*  --  223*   AST unit/L 29  --  15*   ALT unit/L 19  --  11   Magnesium Level mg/dL 1.70*  --  2.00       Vancomycin Administrations:  vancomycin given in the last 96 hours                     vancomycin (VANCOCIN) 500 mg in D5W 100 mL IVPB (MB+) (mg) 500 mg New Bag 08/06/24 2041    vancomycin (VANCOCIN) 1,000 mg in D5W 250 mL IVPB (Vial-Mate) (mg) 1,000 mg New Bag 08/06/24 1505                    Microbiologic Results:  Microbiology Results (last 7 days)       Procedure Component Value Units Date/Time    Wound Culture [[id number]]  (Abnormal) Collected: 08/06/24 1407    Order Status: Completed Specimen: Drainage from Foot, Right Updated: 08/07/24 0644     Wound Culture Moderate PRESUMPTIVE PROTEUS SPECIES    Blood Culture #2 **CANNOT BE ORDERED STAT** [[id number]] Collected: 08/06/24 1418    Order Status: Resulted Specimen: Blood from Antecubital, Right Updated: 08/06/24 1425    Blood Culture #1 **CANNOT BE ORDERED STAT** [[id number]] Collected: 08/06/24 1355    Order Status: Resulted Specimen: Blood from Arm, Left Updated: 08/06/24 1425

## 2024-08-07 NOTE — SUBJECTIVE & OBJECTIVE
Past Medical History:   Diagnosis Date    Chronic diastolic CHF (congestive heart failure)     Gas gangrene of left lower extremity     History of osteomyelitis of left lower extremity     Hyperlipidemia     Hypertension     Iron deficiency anemia     Type II diabetes mellitus     Wet gangrene of right lower extremity        Past Surgical History:   Procedure Laterality Date    BELOW THE KNEE AMPUTATION Left     FOOT AMPUTATION THROUGH ANKLE Left     FOOT AMPUTATION THROUGH METATARSAL Right 05/24/2023    Procedure: AMPUTATION, FOOT, TRANSMETATARSAL;  Surgeon: Max Duckworth MD;  Location: [facility] OR;  Service: General;  Laterality: Right;  4th and 5th toes    WOUND DEBRIDEMENT Right 05/17/2023    Procedure: DEBRIDEMENT, WOUND;  Surgeon: Max Duckworth MD;  Location: [facility] OR;  Service: General;  Laterality: Right;       Review of patient's allergies indicates:   Allergen Reactions    Dilaudid [hydromorphone]     Iodinated contrast media        No current facility-administered medications on file prior to encounter.     Current Outpatient Medications on File Prior to Encounter   Medication Sig    [DISCONTINUED] acetaminophen (TYLENOL) 325 MG tablet Take 2 tablets (650 mg total) by mouth every 8 (eight) hours as needed for Temperature greater than (or equal to 101 degree F).    [DISCONTINUED] amLODIPine (NORVASC) 5 MG tablet Take 1 tablet (5 mg total) by mouth once daily. (Patient not taking: Reported on 8/6/2024)    [DISCONTINUED] collagenase (SANTYL) ointment Apply topically once daily.    [DISCONTINUED] doxycycline (VIBRAMYCIN) 100 MG Cap Take 1 capsule (100 mg total) by mouth 2 (two) times daily.    [DISCONTINUED] famotidine (PEPCID) 20 MG tablet Take 1 tablet (20 mg total) by mouth On call Procedure.    [DISCONTINUED] insulin detemir U-100 (LEVEMIR) 100 unit/mL injection Inject 10 Units into the skin once daily.    [DISCONTINUED] spironolactone (ALDACTONE) 25 MG tablet Take 1 tablet (25 mg total) by mouth  once daily.     Family History       Problem Relation (Age of Onset)    Diabetes Mother    Diabetes Mellitus Sister        Social History:   Tobacco Use    Smoking status: Never     Passive exposure: Never    Smokeless tobacco: Never   Substance and Sexual Activity    Alcohol use: Never    Drug use: Never    Sexual activity: Not on file     Review of Systems   Constitutional:  Positive for chills and fever.   HENT: Negative.     Eyes: Negative.    Respiratory: Negative.     Cardiovascular: Negative.    Gastrointestinal: Negative.    Endocrine: Negative.    Genitourinary: Negative.    Musculoskeletal:  Positive for joint swelling.   Skin:  Positive for wound.   Allergic/Immunologic: Negative.    Neurological:  Positive for weakness.   Hematological: Negative.    Psychiatric/Behavioral: Negative.       Objective:     Vital Signs (Most Recent):  Temp: 98.6 °F (37 °C) (08/06/24 1752)  Pulse: 96 (08/06/24 1850)  Resp: 20 (08/06/24 1850)  BP: 121/70 (08/06/24 1850)  SpO2: 100 % (08/06/24 1850) Vital Signs (24h Range):  Temp:  [98.6 °F (37 °C)-103.2 °F (39.6 °C)] 98.6 °F (37 °C)  Pulse:  [] 96  Resp:  [19-30] 20  SpO2:  [97 %-100 %] 100 %  BP: (109-146)/(52-83) 121/70     Weight: 74.8 kg (165 lb)  Body mass index is 26.63 kg/m².     Physical Exam  General - Chronically ill appearing  male in no acute distress.  HEENT - NCAT. No scleral icterus. Oropharynx clear. Mucous membranes moist.  Neck - No lymphadenopathy, thyromegaly, or JVD.  CVS - Regular rate and rhythm. No murmurs, rubs, or gallops.  Resp - Lungs are clear to auscultation bilaterally. No rales, wheeze, or rhonchi.   GI - Soft, nontender, nondistended, normoactive bowel sounds present.   Extremities - Right lower extremity lymphedema. Left BKA.  Neuro - CN II through XII are grossly intact. No focal neurological deficits. Alert and oriented times four.   Psych - Normal affect.  Skin - Right diabetic heel ulcer.       Significant Labs: All  pertinent labs within the past 24 hours have been reviewed.  CBC:   Recent Labs   Lab 08/06/24  1355   WBC 13.49*   HGB 5.1*   HCT 17.7*   *     CMP:   Recent Labs   Lab 08/06/24  1355   *   K 4.5      CO2 20*   BUN 20   CREATININE 1.09   CALCIUM 8.4   ALBUMIN 2.8*   BILITOT 0.5   ALKPHOS 264*   AST 29   ALT 19     Lactic Acid:   Recent Labs   Lab 08/06/24  1355 08/06/24  1552   LACTATE 2.3* 1.6     Magnesium:   Recent Labs   Lab 08/06/24  1355   MG 1.70*     Troponin:   Recent Labs   Lab 08/06/24  1355 08/06/24  1704   TROPONINI <0.012 <0.012     Urine Studies:   Recent Labs   Lab 08/06/24  1731   COLORU Yellow   APPEARANCEUA Clear   PHUR 6.0   PROTEINUA Trace*   GLUCUA Negative   BILIRUBINUA Negative   OCCULTUA Small*   NITRITE Negative   UROBILINOGEN 2.0*   LEUKOCYTESUR Negative   RBCUA 0-5   WBCUA 0-5   BACTERIA Few*       Significant Imaging: I have reviewed all pertinent imaging results/findings within the past 24 hours.  Imaging Results              CT Abdomen Pelvis  Without Contrast (Final result)  Result time 08/06/24 16:25:18      Final result by Gregor Rodriguez MD (08/06/24 16:25:18)                   Impression:        1. Enlarged inguinal and pelvic lymph nodes.    2.  Haziness to the subcutaneous and adipose tissue which can be seen with generalized edema.    n/a    CATEGORY: n/a    The following dose reduction techniques are used for all CT at [facility]:    1.   Automated exposure control.    2.   Adjustment of the mA and/or kV according to patient size.    3.   Use of iterative reconstruction technique.      Electronically signed by: Gregor Rodriguez  Date:    08/06/2024  Time:    16:25               Narrative:    EXAMINATION:  CT ABDOMEN PELVIS WITHOUT CONTRAST    CLINICAL HISTORY:  GI bleed;With GI bleed;    TECHNIQUE:  Low dose axial images, sagittal and coronal reformations were obtained from the lung bases to the pubic symphysis.  Oral contrast was not  administered.    COMPARISON:  None    FINDINGS:  Liver:  No clinically significant abnormalities noted.    Gallbladder/Biliary System:  No clinically significant abnormalities noted.    Spleen:  No clinically significant abnormalities noted.    Adrenal glands:  No clinically significant abnormalities noted.    Pancreas:  No clinically significant abnormalities noted.    Kidneys/Urinary Tract:  No clinically significant abnormalities noted.    Urinary bladder:  No clinically significant abnormalities noted.    Prostate gland/uterus and ovaries:  No clinically significant abnormalities noted.    GI tract:  Fluid is present within the gastric lumen.    Vascular structures:  No clinically significant abnormalities noted.    Musculoskeletal structures:  No clinically significant abnormalities noted.    Miscellaneous: There is haziness to the subcutaneous and adipose tissue which can be seen with generalized edema.  Enlarged lymph nodes are present in the inguinal and pelvic region.                                       X-Ray Chest 1 View (Final result)  Result time 08/06/24 16:47:46      Final result by Gregor Rodriguez MD (08/06/24 16:47:46)                   Impression:      Borderline enlarged heart with vascular congestion and mild interstitial edema.      Electronically signed by: Gregor Rodriguez  Date:    08/06/2024  Time:    16:47               Narrative:    EXAMINATION:  XR CHEST 1 VIEW    CLINICAL HISTORY:  Fever, unspecified    TECHNIQUE:  Single frontal view of the chest was performed.    COMPARISON:  05/26/2023    FINDINGS:  Mild interstitial edema is present.  A skinfold is noted overlying the left apex.    The cardiac silhouette is borderline enlarged with vascular congestion.  The hilar and mediastinal contours are unremarkable.    Bones are intact.                                       X-Ray Foot Complete Right (Final result)  Result time 08/06/24 17:08:08      Final result by Gregor Rodriguez MD (08/06/24  17:08:08)                   Impression:      Extensive findings as described above for which clinical correlation is recommended along with surgical consultation.      Electronically signed by: Gregor Rodriguez  Date:    08/06/2024  Time:    17:08               Narrative:    EXAMINATION:  XR FOOT COMPLETE 3 VIEW RIGHT    CLINICAL HISTORY:  . Osteomyelitis, unspecified    TECHNIQUE:  AP, lateral, and oblique views of the right foot were performed.    COMPARISON:  05/16/2023    FINDINGS:  Diffuse soft tissue swelling is present.  There is absence of visualization of the 4th and 5th metatarsals as well as the talus with only a small residual of the posterior aspect of the calcaneus remaining and remnants of the midfoot articulations associated with caudal displacement of the distal aspect of the fibula and tibia with surrounding air lucencies extending from the inferior aspect of the tibia and fibula to the sole of the mid and hindfoot.  I suspect the findings to be related to active osteomyelitis and cellulitis and possible abscess and/or a gangrenous process with probable active osteomyelitis superimposed a chronic osteomyelitis and postsurgical findings and probable endstage the Charcot findings.

## 2024-08-07 NOTE — H&P
[facility] Medicine  Telehospitalist History & Physical    Patient Name: Honorio Robb  MRN: [id number]  Patient Class: IP- Inpatient  Admission Date: 8/6/2024  Attending Physician: Denis Mosqueda MD   Primary Care Provider: Winston Lopez MD       Patient information was obtained from patient and ER records.     Subjective:     Principal Problem:Diabetic ulcer of right heel    Chief Complaint: Generalized weakness.    HPI: Mr. Robb is a 58 year old  male with a history of type II diabetes mellitus, HTN, chronic diastolic CHF, and hyperlipidemia who presented to the ER today with a 1 day history of generalized weakness. He was in his normal state of health until 2 weeks ago when he noticed right foot swelling due to a right diabetic heel ulcer. He has had bleeding and drainage from his right foot, fevers, and chills but he denies any chest pain, shortness of breath, nausea, vomiting, or abdominal pain. He developed generalized weakness yesterday which worsened today and he presented to [facility] for further evaluation. Of note, he has a history of chronic right heel ulcer with necrotic wound s/p excisional debridement on 5/17/2023 and transmetatarsal amputation of the 4th and 5th digits on 5/24/2023. On arrival to the ER he was febrile with a temperature of 103.2, tachycardic with a heart rate of 126, and tachypneic with a respiratory rate of 30 and his initial labwork was remarkable for a WBC count of 13.49, lactic acid of 2.3, ESR of 28, CRP of 8.3, hemoglobin of 5.1, hematocrit of 17.7, iron of 221, sodium of 130, glucose of 188, magnesium of 1.7, and BNP of 4820.  His CXR showed a borderline enlarged heart with vascular congestion and mild interstitial edema and x-ray of the right foot revealed diffuse soft tissue swelling and absence of visualization of the 4th and 5th metatarsals as well as the talus with only a small  residual of the posterior aspect of the calcaneus remaining and remnants of the midfoot articulations associated with caudal displacement of the distal aspect of the fibula and tibia with surrounding air lucencies extending from the inferior aspect of the tibia and fibula to the sole of the mid and hindfoot. CT of the abdomen and pelvis demonstrated enlarged inguinal and pelvic lymph nodes haziness to the subcutaneous and adipose tissue which can be seen with generalized edema. He has received vancomycin 500 mg IV and magnesium sulfate 1 gm IV x 1 in the ER. He is otherwise in stable condition and he has no other complaints today.     Past Medical History:   Diagnosis Date    Chronic diastolic CHF (congestive heart failure)     Gas gangrene of left lower extremity     History of osteomyelitis of left lower extremity     Hyperlipidemia     Hypertension     Iron deficiency anemia     Type II diabetes mellitus     Wet gangrene of right lower extremity        Past Surgical History:   Procedure Laterality Date    BELOW THE KNEE AMPUTATION Left     FOOT AMPUTATION THROUGH ANKLE Left     FOOT AMPUTATION THROUGH METATARSAL Right 05/24/2023    Procedure: AMPUTATION, FOOT, TRANSMETATARSAL;  Surgeon: Max Duckworth MD;  Location: [facility] OR;  Service: General;  Laterality: Right;  4th and 5th toes    WOUND DEBRIDEMENT Right 05/17/2023    Procedure: DEBRIDEMENT, WOUND;  Surgeon: Max Duckworth MD;  Location: [facility] OR;  Service: General;  Laterality: Right;       Review of patient's allergies indicates:   Allergen Reactions    Dilaudid [hydromorphone]     Iodinated contrast media        No current facility-administered medications on file prior to encounter.     Current Outpatient Medications on File Prior to Encounter   Medication Sig    [DISCONTINUED] acetaminophen (TYLENOL) 325 MG tablet Take 2 tablets (650 mg total) by mouth every 8 (eight) hours as needed for Temperature greater than (or equal to 101 degree F).     [DISCONTINUED] amLODIPine (NORVASC) 5 MG tablet Take 1 tablet (5 mg total) by mouth once daily. (Patient not taking: Reported on 8/6/2024)    [DISCONTINUED] collagenase (SANTYL) ointment Apply topically once daily.    [DISCONTINUED] doxycycline (VIBRAMYCIN) 100 MG Cap Take 1 capsule (100 mg total) by mouth 2 (two) times daily.    [DISCONTINUED] famotidine (PEPCID) 20 MG tablet Take 1 tablet (20 mg total) by mouth On call Procedure.    [DISCONTINUED] insulin detemir U-100 (LEVEMIR) 100 unit/mL injection Inject 10 Units into the skin once daily.    [DISCONTINUED] spironolactone (ALDACTONE) 25 MG tablet Take 1 tablet (25 mg total) by mouth once daily.     Family History       Problem Relation (Age of Onset)    Diabetes Mother    Diabetes Mellitus Sister        Social History:   Tobacco Use    Smoking status: Never     Passive exposure: Never    Smokeless tobacco: Never   Substance and Sexual Activity    Alcohol use: Never    Drug use: Never    Sexual activity: Not on file     Review of Systems   Constitutional:  Positive for chills and fever.   HENT: Negative.     Eyes: Negative.    Respiratory: Negative.     Cardiovascular: Negative.    Gastrointestinal: Negative.    Endocrine: Negative.    Genitourinary: Negative.    Musculoskeletal:  Positive for joint swelling.   Skin:  Positive for wound.   Allergic/Immunologic: Negative.    Neurological:  Positive for weakness.   Hematological: Negative.    Psychiatric/Behavioral: Negative.       Objective:     Vital Signs (Most Recent):  Temp: 98.6 °F (37 °C) (08/06/24 1752)  Pulse: 96 (08/06/24 1850)  Resp: 20 (08/06/24 1850)  BP: 121/70 (08/06/24 1850)  SpO2: 100 % (08/06/24 1850) Vital Signs (24h Range):  Temp:  [98.6 °F (37 °C)-103.2 °F (39.6 °C)] 98.6 °F (37 °C)  Pulse:  [] 96  Resp:  [19-30] 20  SpO2:  [97 %-100 %] 100 %  BP: (109-146)/(52-83) 121/70     Weight: 74.8 kg (165 lb)  Body mass index is 26.63 kg/m².     Physical Exam  General - Chronically ill appearing   male in no acute distress.  HEENT - NCAT. No scleral icterus. Oropharynx clear. Mucous membranes moist.  Neck - No lymphadenopathy, thyromegaly, or JVD.  CVS - Regular rate and rhythm. No murmurs, rubs, or gallops.  Resp - Lungs are clear to auscultation bilaterally. No rales, wheeze, or rhonchi.   GI - Soft, nontender, nondistended, normoactive bowel sounds present.   Extremities - Right lower extremity lymphedema. Left BKA.  Neuro - CN II through XII are grossly intact. No focal neurological deficits. Alert and oriented times four.   Psych - Normal affect.  Skin - Right diabetic heel ulcer.       Significant Labs: All pertinent labs within the past 24 hours have been reviewed.  CBC:   Recent Labs   Lab 08/06/24  1355   WBC 13.49*   HGB 5.1*   HCT 17.7*   *     CMP:   Recent Labs   Lab 08/06/24  1355   *   K 4.5      CO2 20*   BUN 20   CREATININE 1.09   CALCIUM 8.4   ALBUMIN 2.8*   BILITOT 0.5   ALKPHOS 264*   AST 29   ALT 19     Lactic Acid:   Recent Labs   Lab 08/06/24  1355 08/06/24  1552   LACTATE 2.3* 1.6     Magnesium:   Recent Labs   Lab 08/06/24  1355   MG 1.70*     Troponin:   Recent Labs   Lab 08/06/24  1355 08/06/24  1704   TROPONINI <0.012 <0.012     Urine Studies:   Recent Labs   Lab 08/06/24  1731   COLORU Yellow   APPEARANCEUA Clear   PHUR 6.0   PROTEINUA Trace*   GLUCUA Negative   BILIRUBINUA Negative   OCCULTUA Small*   NITRITE Negative   UROBILINOGEN 2.0*   LEUKOCYTESUR Negative   RBCUA 0-5   WBCUA 0-5   BACTERIA Few*       Significant Imaging: I have reviewed all pertinent imaging results/findings within the past 24 hours.  Imaging Results              CT Abdomen Pelvis  Without Contrast (Final result)  Result time 08/06/24 16:25:18      Final result by Gregor Rodriguez MD (08/06/24 16:25:18)                   Impression:        1. Enlarged inguinal and pelvic lymph nodes.    2.  Haziness to the subcutaneous and adipose tissue which can be seen with generalized  edema.    n/a    CATEGORY: n/a    The following dose reduction techniques are used for all CT at [facility]:    1.   Automated exposure control.    2.   Adjustment of the mA and/or kV according to patient size.    3.   Use of iterative reconstruction technique.      Electronically signed by: Gregor Rodriguez  Date:    08/06/2024  Time:    16:25               Narrative:    EXAMINATION:  CT ABDOMEN PELVIS WITHOUT CONTRAST    CLINICAL HISTORY:  GI bleed;With GI bleed;    TECHNIQUE:  Low dose axial images, sagittal and coronal reformations were obtained from the lung bases to the pubic symphysis.  Oral contrast was not administered.    COMPARISON:  None    FINDINGS:  Liver:  No clinically significant abnormalities noted.    Gallbladder/Biliary System:  No clinically significant abnormalities noted.    Spleen:  No clinically significant abnormalities noted.    Adrenal glands:  No clinically significant abnormalities noted.    Pancreas:  No clinically significant abnormalities noted.    Kidneys/Urinary Tract:  No clinically significant abnormalities noted.    Urinary bladder:  No clinically significant abnormalities noted.    Prostate gland/uterus and ovaries:  No clinically significant abnormalities noted.    GI tract:  Fluid is present within the gastric lumen.    Vascular structures:  No clinically significant abnormalities noted.    Musculoskeletal structures:  No clinically significant abnormalities noted.    Miscellaneous: There is haziness to the subcutaneous and adipose tissue which can be seen with generalized edema.  Enlarged lymph nodes are present in the inguinal and pelvic region.                                       X-Ray Chest 1 View (Final result)  Result time 08/06/24 16:47:46      Final result by Gregor Rodriguez MD (08/06/24 16:47:46)                   Impression:      Borderline enlarged heart with vascular congestion and mild interstitial edema.      Electronically signed  by: Gregor Rodriguez  Date:    08/06/2024  Time:    16:47               Narrative:    EXAMINATION:  XR CHEST 1 VIEW    CLINICAL HISTORY:  Fever, unspecified    TECHNIQUE:  Single frontal view of the chest was performed.    COMPARISON:  05/26/2023    FINDINGS:  Mild interstitial edema is present.  A skinfold is noted overlying the left apex.    The cardiac silhouette is borderline enlarged with vascular congestion.  The hilar and mediastinal contours are unremarkable.    Bones are intact.                                       X-Ray Foot Complete Right (Final result)  Result time 08/06/24 17:08:08      Final result by Gregor Rodriguez MD (08/06/24 17:08:08)                   Impression:      Extensive findings as described above for which clinical correlation is recommended along with surgical consultation.      Electronically signed by: Gregor Rodriguez  Date:    08/06/2024  Time:    17:08               Narrative:    EXAMINATION:  XR FOOT COMPLETE 3 VIEW RIGHT    CLINICAL HISTORY:  . Osteomyelitis, unspecified    TECHNIQUE:  AP, lateral, and oblique views of the right foot were performed.    COMPARISON:  05/16/2023    FINDINGS:  Diffuse soft tissue swelling is present.  There is absence of visualization of the 4th and 5th metatarsals as well as the talus with only a small residual of the posterior aspect of the calcaneus remaining and remnants of the midfoot articulations associated with caudal displacement of the distal aspect of the fibula and tibia with surrounding air lucencies extending from the inferior aspect of the tibia and fibula to the sole of the mid and hindfoot.  I suspect the findings to be related to active osteomyelitis and cellulitis and possible abscess and/or a gangrenous process with probable active osteomyelitis superimposed a chronic osteomyelitis and postsurgical findings and probable endstage the Charcot findings.                                      Assessment/Plan:     * Diabetic ulcer of right  heel  An MRI of the right foot will be obtained to rule out osteomyelitis. Consult general surgery for debridement.    Sepsis  Continue vancomycin 15 mg/kg IV every 12 hours, zosyn 3.375 gm IV every 6 hours, and IV fluids. 2 sets of blood cultures have been obtained in the ER.      Cellulitis of right foot  Continue vancomycin 15 mg/kg IV every 12 hours and zosyn 3.375 gm IV every 6 hours.    Acute blood loss anemia  Transfuse 2 units packed RBCs now. Repeat CBC in the morning. Iron panel from 8/6/2024 was consistent with an iron deficiency anemia.    Generalized weakness  Consult PT.    Lactic acidosis  Resolved. Continue IV fluids.    Hypomagnesemia  She has received magnesium sulfate 1 gm IV x 1 in the ER. Repeat magnesium in the morning.    Hyponatremia  Continue IV fluids.     Type II diabetes mellitus  Continue SSI. Hemoglobin A1c pending.      VTE Risk Mitigation (From admission, onward)           Ordered     enoxaparin injection 40 mg  Daily         08/06/24 1954     IP VTE HIGH RISK PATIENT  Once         08/06/24 1954     Place sequential compression device  Until discontinued         08/06/24 1954                     This service was provided via telemedicine.  Type of Software: Audio/Visual.  Originating Site: [facility].  Distant Site: [place], [place].  His exam was performed with the assistance of her ER nurse.       Tahir Ha MD  Department of Hospital Medicine  [facility]

## 2024-08-07 NOTE — PLAN OF CARE
08/07/24 0915   Discharge Assessment   Assessment Type Discharge Planning Assessment   Confirmed/corrected address, phone number and insurance Yes   Confirmed Demographics Correct on Facesheet   Source of Information patient   When was your last doctors appointment?   (has no PCP)   Communicated MIRYAM with patient/caregiver Date not available/Unable to determine   Reason For Admission diabetic foot wound   People in Home alone   Facility Arrived From: home   Do you expect to return to your current living situation? Yes   Do you have help at home or someone to help you manage your care at home? No   Prior to hospitilization cognitive status: Alert/Oriented   Current cognitive status: Alert/Oriented   Walking or Climbing Stairs Difficulty yes   Walking or Climbing Stairs ambulation difficulty, requires equipment   Mobility Management prothesis   Dressing/Bathing Difficulty no   Home Layout Able to live on 1st floor   Equipment Currently Used at Home prosthesis   Readmission within 30 days? No   Patient currently being followed by outpatient case management? No   Do you currently have service(s) that help you manage your care at home? No   Do you take prescription medications? No   Do you have prescription coverage? Yes   Coverage Healthy Blue MELISSA   Do you have any problems affording any of your prescribed medications? TBD   Who is going to help you get home at discharge? drives himself   How do you get to doctors appointments? car, drives self   Are you on dialysis? No   Do you take coumadin? No   Discharge Plan A Long-term acute care facility (LTAC)   Discharge Plan B Skilled Nursing Facility   DME Needed Upon Discharge  walker, rolling   Discharge Plan discussed with: Patient   Transition of Care Barriers Does not adhere to care plan;Mobility;No family/friends to help;Utilities   Physical Activity   On average, how many days per week do you engage in moderate to strenuous exercise (like a brisk walk)? 7 days   On  average, how many minutes do you engage in exercise at this level? 30 min   Financial Resource Strain   How hard is it for you to pay for the very basics like food, housing, medical care, and heating? Very Hard   Housing Stability   In the last 12 months, was there a time when you were not able to pay the mortgage or rent on time? Y   At any time in the past 12 months, were you homeless or living in a shelter (including now)? Y   Transportation Needs   Has the lack of transportation kept you from medical appointments, meetings, work or from getting things needed for daily living? No   Food Insecurity   Within the past 12 months, you worried that your food would run out before you got the money to buy more. Often true   Within the past 12 months, the food you bought just didn't last and you didn't have money to get more. Often true   Stress   Do you feel stress - tense, restless, nervous, or anxious, or unable to sleep at night because your mind is troubled all the time - these days? To some exte   Social Isolation   How often do you feel lonely or isolated from those around you?  Rarely   Alcohol Use   Q1: How often do you have a drink containing alcohol? Never   Q2: How many drinks containing alcohol do you have on a typical day when you are drinking? None   Q3: How often do you have six or more drinks on one occasion? Never   Utilities   In the past 12 months has the electric, gas, oil, or water company threatened to shut off services in your home? Shut off   Health Literacy   How often do you need to have someone help you when you read instructions, pamphlets, or other written material from your doctor or pharmacy? Rarely     Patient states he recently returned to [place] and has been working, driving tow truck until 3 weeks ago. Cannot afford utilities and has no electricity at this time. Patient has no PCP and is currently not taking any medication for diabetes. Has agreed to LTAC/ SNF placement for wound  care and [place] at [place]. Will refer him to [facility] for financial assistance

## 2024-08-07 NOTE — ASSESSMENT & PLAN NOTE
Continue vancomycin 15 mg/kg IV every 12 hours, zosyn 3.375 gm IV every 6 hours, and IV fluids. 2 sets of blood cultures have been obtained in the ER.

## 2024-08-07 NOTE — ASSESSMENT & PLAN NOTE
S/p 2 units packed RBCs   Repeat CBC in the morning.   Iron panel from 8/6/2024 was consistent with an iron deficiency anemia.  Got another 2 U PRBC today

## 2024-08-07 NOTE — ASSESSMENT & PLAN NOTE
An MRI of the right foot will be obtained to rule out osteomyelitis. Consult general surgery for debridement.

## 2024-08-07 NOTE — HOSPITAL COURSE
08/07/2024 pt admitted with wounds on LE right h/o BKA on left, states swelling RLE started last 2-3 weeks much worse.  Surgery consulted and plans for debridement today  08/08/2024  wound on RLE, anemia hgb was 6, now 8. CIS consulted and does not want to risk stenting due to need for blood thinners which he most likley will not tolerate until source of bleeding has been determined.    08/09/2024 CTA shows significant osteomyelitis right LE, significant blockages, also LAD int he pelvic and iliac areas.  Source of anemia discussed with surgery and pt will have prep and colonoscopy planned for am.  Doubt candidate for vascular intervention given extent of osteomyelitis will likely need AKA so risk/isabelle of vascular intervention probably too high.  08/10/2024 pt given miralax colonoscoyp prep yesterday evening, did not have any bm.  Admitted with hgb 5, needs work up for anemia prior to determining if he could potentially have vascular intervention/stents to RLE.  There is significant osteomyelitis seen in tib fib as well as foot and calcaneous discussed with surgery may end up with AKA and revascularization may not be necessary or helpful.  Continuing iv abx for now.  Pt very active and already had left BKA would like to spare his RLE as he was working and independent prior to this episode.  08/11/2024 H&H has been stable, EGD was negative for source of bleeding, plans for colonsocopy today.  08/12/2024 H/H stable  Colonoscopy- sigmoid diverticulosis, grade I internal hemorrhoids  Wound culture R foot- proteus and beta hemolytic strep on pen G  Working on LTAC placement   08/13/2024   H/H stable today   Pain controlled   Plan for surgery eval later today considering debdridment, I&D  Cont iv rocephin for proteus and beta hemolytic strep   08/14/2024  Npo for surgery plan for I&D  PICC line for long term iv abx

## 2024-08-07 NOTE — INTERVAL H&P NOTE
The patient has been examined and the H&P has been reviewed:    I concur with the findings and no changes have occurred since H&P was written.    Anesthesia/Surgery risks, benefits and alternative options discussed and understood by patient/family.      Will perform rt foot debridement today    Active Hospital Problems    Diagnosis  POA    *Diabetic ulcer of right heel [E11.621, L97.419]  Yes    Cellulitis of right foot [L03.115]  Unknown    Sepsis [A41.9]  Unknown    Lactic acidosis [E87.20]  Unknown    Hyponatremia [E87.1]  Unknown    Hypomagnesemia [E83.42]  Unknown    Type II diabetes mellitus [E11.9]  Unknown    Generalized weakness [R53.1]  Unknown    Acute blood loss anemia [D62]  Yes      Resolved Hospital Problems   No resolved problems to display.

## 2024-08-07 NOTE — HPI
Mr. Robb is a 58 year old  male with a history of type II diabetes mellitus, HTN, chronic diastolic CHF, and hyperlipidemia who presented to the ER today with a 1 day history of generalized weakness. He was in his normal state of health until 2 weeks ago when he noticed right foot swelling due to a right diabetic heel ulcer. He has had bleeding and drainage from his right foot, fevers, and chills but he denies any chest pain, shortness of breath, nausea, vomiting, or abdominal pain. He developed generalized weakness yesterday which worsened today and he presented to [facility] for further evaluation. Of note, he has a history of chronic right heel ulcer with necrotic wound s/p excisional debridement on 5/17/2023 and transmetatarsal amputation of the 4th and 5th digits on 5/24/2023. On arrival to the ER he was febrile with a temperature of 103.2, tachycardic with a heart rate of 126, and tachypneic with a respiratory rate of 30 and his initial labwork was remarkable for a WBC count of 13.49, lactic acid of 2.3, ESR of 28, CRP of 8.3, hemoglobin of 5.1, hematocrit of 17.7, iron of 221, sodium of 130, glucose of 188, magnesium of 1.7, and BNP of 4820.  His CXR showed a borderline enlarged heart with vascular congestion and mild interstitial edema and x-ray of the right foot revealed diffuse soft tissue swelling and absence of visualization of the 4th and 5th metatarsals as well as the talus with only a small residual of the posterior aspect of the calcaneus remaining and remnants of the midfoot articulations associated with caudal displacement of the distal aspect of the fibula and tibia with surrounding air lucencies extending from the inferior aspect of the tibia and fibula to the sole of the mid and hindfoot. CT of the abdomen and pelvis demonstrated enlarged inguinal and pelvic lymph nodes haziness to the subcutaneous and adipose tissue which can be seen with generalized  edema. He has received vancomycin 500 mg IV and magnesium sulfate 1 gm IV x 1 in the ER. He is otherwise in stable condition and he has no other complaints today.

## 2024-08-07 NOTE — PLAN OF CARE
Problem: Adult Inpatient Plan of Care  Goal: Plan of Care Review  Outcome: Progressing  Goal: Patient-Specific Goal (Individualized)  Outcome: Progressing  Goal: Absence of Hospital-Acquired Illness or Injury  Outcome: Progressing  Goal: Optimal Comfort and Wellbeing  Outcome: Progressing  Goal: Readiness for Transition of Care  Outcome: Progressing     Problem: Diabetes Comorbidity  Goal: Blood Glucose Level Within Targeted Range  Outcome: Progressing     Problem: Sepsis/Septic Shock  Goal: Optimal Coping  Outcome: Progressing  Goal: Absence of Bleeding  Outcome: Progressing  Goal: Blood Glucose Level Within Targeted Range  Outcome: Progressing  Goal: Absence of Infection Signs and Symptoms  Outcome: Progressing  Goal: Optimal Nutrition Intake  Outcome: Progressing     Problem: Wound  Goal: Optimal Coping  Outcome: Progressing  Goal: Optimal Functional Ability  Outcome: Progressing  Goal: Absence of Infection Signs and Symptoms  Outcome: Progressing  Goal: Improved Oral Intake  Outcome: Progressing  Goal: Optimal Pain Control and Function  Outcome: Progressing  Goal: Skin Health and Integrity  Outcome: Progressing  Goal: Optimal Wound Healing  Outcome: Progressing     Problem: Fall Injury Risk  Goal: Absence of Fall and Fall-Related Injury  Outcome: Progressing     Problem: Skin Injury Risk Increased  Goal: Skin Health and Integrity  Outcome: Progressing     Problem: Fatigue  Goal: Improved Activity Tolerance  Outcome: Progressing     Problem: Diabetes  Goal: Optimal Coping  Outcome: Progressing  Goal: Optimal Functional Ability  Outcome: Progressing  Goal: Blood Glucose Level Within Target Range  Outcome: Progressing  Goal: Minimize Risk of Hypoglycemia  Outcome: Progressing     Problem: Anemia  Goal: Anemia Symptom Improvement  Outcome: Progressing     Problem: Heart Failure  Goal: Optimal Coping  Outcome: Progressing  Goal: Optimal Cardiac Output  Outcome: Progressing  Goal: Stable Heart Rate and  Rhythm  Outcome: Progressing  Goal: Optimal Functional Ability  Outcome: Progressing  Goal: Fluid and Electrolyte Balance  Outcome: Progressing  Goal: Improved Oral Intake  Outcome: Progressing  Goal: Effective Oxygenation and Ventilation  Outcome: Progressing  Goal: Effective Breathing Pattern During Sleep  Outcome: Progressing

## 2024-08-07 NOTE — ASSESSMENT & PLAN NOTE
Transfuse 2 units packed RBCs now. Repeat CBC in the morning. Iron panel from 8/6/2024 was consistent with an iron deficiency anemia.

## 2024-08-08 PROBLEM — M86.371 CHRONIC MULTIFOCAL OSTEOMYELITIS OF RIGHT FOOT: Status: ACTIVE | Noted: 2024-08-08

## 2024-08-08 PROBLEM — I73.9 PAD (PERIPHERAL ARTERY DISEASE): Status: ACTIVE | Noted: 2024-08-08

## 2024-08-08 LAB
ALBUMIN SERPL-MCNC: 1.9 G/DL (ref 3.4–5)
ALBUMIN/GLOB SERPL: 0.6 RATIO
ALP SERPL-CCNC: 195 UNIT/L (ref 50–144)
ALT SERPL-CCNC: 10 UNIT/L (ref 1–45)
ANION GAP SERPL CALC-SCNC: 3 MEQ/L (ref 2–13)
AST SERPL-CCNC: 16 UNIT/L (ref 17–59)
BASOPHILS # BLD AUTO: 0.05 X10(3)/MCL (ref 0.01–0.08)
BASOPHILS NFR BLD AUTO: 0.5 % (ref 0.1–1.2)
BILIRUB SERPL-MCNC: 0.5 MG/DL (ref 0–1)
BUN SERPL-MCNC: 14 MG/DL (ref 7–20)
CALCIUM SERPL-MCNC: 8.2 MG/DL (ref 8.4–10.2)
CHLORIDE SERPL-SCNC: 106 MMOL/L (ref 98–110)
CO2 SERPL-SCNC: 21 MMOL/L (ref 21–32)
CREAT SERPL-MCNC: 0.94 MG/DL (ref 0.66–1.25)
CREAT/UREA NIT SERPL: 15 (ref 12–20)
EOSINOPHIL # BLD AUTO: 0.23 X10(3)/MCL (ref 0.04–0.54)
EOSINOPHIL NFR BLD AUTO: 2.1 % (ref 0.7–7)
ERYTHROCYTE [DISTWIDTH] IN BLOOD BY AUTOMATED COUNT: 19.3 %
GFR SERPLBLD CREATININE-BSD FMLA CKD-EPI: >90 ML/MIN/1.73/M2
GLOBULIN SER-MCNC: 3.4 GM/DL (ref 2–3.9)
GLUCOSE SERPL-MCNC: 179 MG/DL (ref 70–115)
HCT VFR BLD AUTO: 24.4 % (ref 36–52)
HGB BLD-MCNC: 8 G/DL (ref 13–18)
IMM GRANULOCYTES # BLD AUTO: 0.05 X10(3)/MCL (ref 0–0.03)
IMM GRANULOCYTES NFR BLD AUTO: 0.5 % (ref 0–0.5)
LYMPHOCYTES # BLD AUTO: 0.59 X10(3)/MCL (ref 1.32–3.57)
LYMPHOCYTES NFR BLD AUTO: 5.5 % (ref 20–55)
MAGNESIUM SERPL-MCNC: 1.7 MG/DL (ref 1.8–2.4)
MCH RBC QN AUTO: 26.5 PG (ref 27–34)
MCHC RBC AUTO-ENTMCNC: 32.8 G/DL (ref 31–37)
MCV RBC AUTO: 80.8 FL (ref 79–99)
MONOCYTES # BLD AUTO: 1.01 X10(3)/MCL (ref 0.3–0.82)
MONOCYTES NFR BLD AUTO: 9.4 % (ref 4.7–12.5)
NEUTROPHILS # BLD AUTO: 8.78 X10(3)/MCL (ref 1.78–5.38)
NEUTROPHILS NFR BLD AUTO: 82 % (ref 37–73)
NRBC BLD AUTO-RTO: 0 %
PLATELET # BLD AUTO: 508 X10(3)/MCL (ref 140–371)
PLATELET # BLD EST: ABNORMAL 10*3/UL
PMV BLD AUTO: ABNORMAL FL
POCT GLUCOSE: 150 MG/DL (ref 70–110)
POCT GLUCOSE: 159 MG/DL (ref 70–110)
POCT GLUCOSE: 194 MG/DL (ref 70–110)
POTASSIUM SERPL-SCNC: 4.1 MMOL/L (ref 3.5–5.1)
PROT SERPL-MCNC: 5.3 GM/DL (ref 6.3–8.2)
RBC # BLD AUTO: 3.02 X10(6)/MCL (ref 4–6)
RBC MORPH BLD: NORMAL
SODIUM SERPL-SCNC: 130 MMOL/L (ref 136–145)
VANCOMYCIN TROUGH SERPL-MCNC: 10.9 UG/ML (ref 10–20)
WBC # BLD AUTO: 10.71 X10(3)/MCL (ref 4–11.5)

## 2024-08-08 PROCEDURE — 94761 N-INVAS EAR/PLS OXIMETRY MLT: CPT

## 2024-08-08 PROCEDURE — 80053 COMPREHEN METABOLIC PANEL: CPT | Performed by: INTERNAL MEDICINE

## 2024-08-08 PROCEDURE — 63600175 PHARM REV CODE 636 W HCPCS: Performed by: FAMILY MEDICINE

## 2024-08-08 PROCEDURE — 63600175 PHARM REV CODE 636 W HCPCS: Performed by: INTERNAL MEDICINE

## 2024-08-08 PROCEDURE — 36415 COLL VENOUS BLD VENIPUNCTURE: CPT | Performed by: INTERNAL MEDICINE

## 2024-08-08 PROCEDURE — 83735 ASSAY OF MAGNESIUM: CPT | Performed by: FAMILY MEDICINE

## 2024-08-08 PROCEDURE — 21400001 HC TELEMETRY ROOM

## 2024-08-08 PROCEDURE — 80202 ASSAY OF VANCOMYCIN: CPT | Performed by: INTERNAL MEDICINE

## 2024-08-08 PROCEDURE — 85025 COMPLETE CBC W/AUTO DIFF WBC: CPT | Performed by: INTERNAL MEDICINE

## 2024-08-08 PROCEDURE — 25000003 PHARM REV CODE 250: Performed by: INTERNAL MEDICINE

## 2024-08-08 RX ORDER — DIPHENHYDRAMINE HYDROCHLORIDE 50 MG/ML
50 INJECTION INTRAMUSCULAR; INTRAVENOUS
Status: DISCONTINUED | OUTPATIENT
Start: 2024-08-08 | End: 2024-08-09

## 2024-08-08 RX ORDER — DIPHENHYDRAMINE HCL 25 MG
50 CAPSULE ORAL
Status: DISCONTINUED | OUTPATIENT
Start: 2024-08-08 | End: 2024-08-14 | Stop reason: HOSPADM

## 2024-08-08 RX ORDER — MAGNESIUM SULFATE HEPTAHYDRATE 40 MG/ML
2 INJECTION, SOLUTION INTRAVENOUS ONCE
Status: COMPLETED | OUTPATIENT
Start: 2024-08-08 | End: 2024-08-08

## 2024-08-08 RX ADMIN — SODIUM CHLORIDE: 9 INJECTION, SOLUTION INTRAVENOUS at 09:08

## 2024-08-08 RX ADMIN — PIPERACILLIN SODIUM AND TAZOBACTAM SODIUM 4.5 G: 4; .5 INJECTION, POWDER, LYOPHILIZED, FOR SOLUTION INTRAVENOUS at 05:08

## 2024-08-08 RX ADMIN — VANCOMYCIN HYDROCHLORIDE 1750 MG: 1 INJECTION, POWDER, LYOPHILIZED, FOR SOLUTION INTRAVENOUS at 11:08

## 2024-08-08 RX ADMIN — VANCOMYCIN HYDROCHLORIDE 1750 MG: 1 INJECTION, POWDER, LYOPHILIZED, FOR SOLUTION INTRAVENOUS at 10:08

## 2024-08-08 RX ADMIN — PIPERACILLIN SODIUM AND TAZOBACTAM SODIUM 4.5 G: 4; .5 INJECTION, POWDER, LYOPHILIZED, FOR SOLUTION INTRAVENOUS at 01:08

## 2024-08-08 RX ADMIN — MAGNESIUM SULFATE HEPTAHYDRATE 2 G: 40 INJECTION, SOLUTION INTRAVENOUS at 09:08

## 2024-08-08 RX ADMIN — PIPERACILLIN SODIUM AND TAZOBACTAM SODIUM 4.5 G: 4; .5 INJECTION, POWDER, LYOPHILIZED, FOR SOLUTION INTRAVENOUS at 10:08

## 2024-08-08 NOTE — PROGRESS NOTES
[facility]-Med/Surg  Wound Care    Patient Name:  Honorio Robb   MRN:  [id number]  Date: 8/8/2024  Diagnosis: Diabetic ulcer of right heel    History:     Past Medical History:   Diagnosis Date    Chronic diastolic CHF (congestive heart failure)     Gas gangrene of left lower extremity     History of osteomyelitis of left lower extremity     Hyperlipidemia     Hypertension     Iron deficiency anemia     Type II diabetes mellitus     Wet gangrene of right lower extremity        Social History     Socioeconomic History    Marital status: Single   Tobacco Use    Smoking status: Never     Passive exposure: Never    Smokeless tobacco: Never   Substance and Sexual Activity    Alcohol use: Never    Drug use: Never     Social Determinants of Health     Financial Resource Strain: High Risk (8/7/2024)    Overall Financial Resource Strain (CARDIA)     Difficulty of Paying Living Expenses: Very hard   Food Insecurity: Food Insecurity Present (8/7/2024)    Hunger Vital Sign     Worried About Running Out of Food in the Last Year: Often true     Ran Out of Food in the Last Year: Often true   Transportation Needs: No Transportation Needs (8/7/2024)    TRANSPORTATION NEEDS     Transportation : No   Physical Activity: Sufficiently Active (8/7/2024)    Exercise Vital Sign     Days of Exercise per Week: 7 days     Minutes of Exercise per Session: 30 min   Stress: Stress Concern Present (8/7/2024)    [employer] - Occupational Stress Questionnaire     Feeling of Stress : To some extent   Housing Stability: High Risk (8/7/2024)    Housing Stability Vital Sign     Unable to Pay for Housing in the Last Year: Yes     Homeless in the Last Year: Yes       Precautions:     Allergies as of 08/06/2024 - Reviewed 08/06/2024   Allergen Reaction Noted    Dilaudid [hydromorphone]  05/17/2023    Iodinated contrast media  05/17/2023       WOC Assessment Details/Treatment        08/08/24 1150   WOCN  Assessment   WOCN Total Time (mins) 50   Visit Date 08/08/24   Visit Time 1150   Consult Type New   WOCN Speciality Wound   Wound other   Intervention changed;assessed   Teaching on-going        Wound 08/06/24 2150 Other (comment) Right plantar;distal Heel #1   Date First Assessed/Time First Assessed: 08/06/24 2150   Present on Original Admission: Yes  Primary Wound Type: Other (comment)  Side: Right  Orientation: plantar;distal  Location: (c) Heel  Wound Number: #1  Is this injury device related?: No   Wound Image     Dressing Appearance Moist drainage   Drainage Amount Large   Drainage Characteristics/Odor Creamy;Serosanguineous;Tan;Malodorous   Appearance Red;Moist;Black   Periwound Area Edematous   Wound Edges Irregular   Wound Length (cm) 14 cm   Wound Width (cm) 13 cm   Wound Depth (cm) 3 cm   Wound Volume (cm^3) 546 cm^3   Wound Surface Area (cm^2) 182 cm^2   Care Cleansed with:  (vashe)   Dressing Applied  (vashe moist gauze, abd pads, kerlix and ace)   Dressing Change Due 08/09/24        Wound 08/06/24 2150 Other (comment) Right lateral Calf #2   Date First Assessed/Time First Assessed: 08/06/24 2150   Present on Original Admission: Yes  Primary Wound Type: Other (comment)  Side: Right  Orientation: lateral  Location: Calf  Wound Number: #2  Is this injury device related?: No   Wound Image    Dressing Appearance Dry;Intact;Clean   Drainage Amount Scant   Drainage Characteristics/Odor Serosanguineous;Malodorous   Appearance Yellow;Pink;Smooth;Moist   Tissue loss description Full thickness   Periwound Area Edematous  (dry and flaky)   Wound Edges Defined   Wound Length (cm) 2.7 cm   Wound Width (cm) 1.5 cm   Wound Depth (cm) 0.1 cm   Wound Volume (cm^3) 0.405 cm^3   Wound Surface Area (cm^2) 4.05 cm^2   Care Cleansed with:  (vashe)   Dressing Applied  (vashe moist gauze and covered with gauze, kerlix and ace)   Dressing Change Due 08/09/24       Orders placed.     08/08/2024

## 2024-08-08 NOTE — ASSESSMENT & PLAN NOTE
US arterial abnormal to RLE  JAMAL 1.2  Samson needs revascularization will have to wait until source of bleeding has been determined per [facility]

## 2024-08-08 NOTE — PLAN OF CARE
Problem: Adult Inpatient Plan of Care  Goal: Plan of Care Review  Outcome: Progressing  Goal: Patient-Specific Goal (Individualized)  Outcome: Progressing  Goal: Absence of Hospital-Acquired Illness or Injury  Outcome: Progressing  Goal: Optimal Comfort and Wellbeing  Outcome: Progressing  Goal: Readiness for Transition of Care  Outcome: Progressing     Problem: Diabetes Comorbidity  Goal: Blood Glucose Level Within Targeted Range  Outcome: Progressing     Problem: Sepsis/Septic Shock  Goal: Optimal Coping  Outcome: Progressing  Goal: Absence of Bleeding  Outcome: Progressing  Goal: Blood Glucose Level Within Targeted Range  Outcome: Progressing  Goal: Absence of Infection Signs and Symptoms  Outcome: Progressing  Goal: Optimal Nutrition Intake  Outcome: Progressing     Problem: Wound  Goal: Optimal Coping  Outcome: Progressing  Goal: Optimal Functional Ability  Outcome: Progressing  Goal: Absence of Infection Signs and Symptoms  Outcome: Progressing  Goal: Improved Oral Intake  Outcome: Progressing  Goal: Optimal Pain Control and Function  Outcome: Progressing  Goal: Skin Health and Integrity  Outcome: Progressing  Goal: Optimal Wound Healing  Outcome: Progressing     Problem: Fall Injury Risk  Goal: Absence of Fall and Fall-Related Injury  Outcome: Progressing     Problem: Skin Injury Risk Increased  Goal: Skin Health and Integrity  Outcome: Progressing     Problem: Diabetes  Goal: Optimal Coping  Outcome: Progressing  Goal: Optimal Functional Ability  Outcome: Progressing  Goal: Blood Glucose Level Within Target Range  Outcome: Progressing  Goal: Minimize Risk of Hypoglycemia  Outcome: Progressing     Problem: Anemia  Goal: Anemia Symptom Improvement  Outcome: Progressing     Problem: Heart Failure  Goal: Optimal Coping  Outcome: Progressing  Goal: Optimal Cardiac Output  Outcome: Progressing  Goal: Stable Heart Rate and Rhythm  Outcome: Progressing  Goal: Optimal Functional Ability  Outcome: Progressing  Goal:  Fluid and Electrolyte Balance  Outcome: Progressing  Goal: Improved Oral Intake  Outcome: Progressing  Goal: Effective Oxygenation and Ventilation  Outcome: Progressing  Goal: Effective Breathing Pattern During Sleep  Outcome: Progressing

## 2024-08-08 NOTE — PROGRESS NOTES
[facility]-Med/Surg  [facility] Medicine  Progress Note    Patient Name: Honorio Robb  MRN: [id number]  Patient Class: IP- Inpatient   Admission Date: 8/6/2024  Length of Stay: 2 days  Attending Physician: Tahir Ha MD  Primary Care Provider: Winston Lopez MD        Subjective:     Principal Problem:Diabetic ulcer of right heel        HPI:  Mr. Robb is a 58 year old  male with a history of type II diabetes mellitus, HTN, chronic diastolic CHF, and hyperlipidemia who presented to the ER today with a 1 day history of generalized weakness. He was in his normal state of health until 2 weeks ago when he noticed right foot swelling due to a right diabetic heel ulcer. He has had bleeding and drainage from his right foot, fevers, and chills but he denies any chest pain, shortness of breath, nausea, vomiting, or abdominal pain. He developed generalized weakness yesterday which worsened today and he presented to [facility] for further evaluation. Of note, he has a history of chronic right heel ulcer with necrotic wound s/p excisional debridement on 5/17/2023 and transmetatarsal amputation of the 4th and 5th digits on 5/24/2023. On arrival to the ER he was febrile with a temperature of 103.2, tachycardic with a heart rate of 126, and tachypneic with a respiratory rate of 30 and his initial labwork was remarkable for a WBC count of 13.49, lactic acid of 2.3, ESR of 28, CRP of 8.3, hemoglobin of 5.1, hematocrit of 17.7, iron of 221, sodium of 130, glucose of 188, magnesium of 1.7, and BNP of 4820.  His CXR showed a borderline enlarged heart with vascular congestion and mild interstitial edema and x-ray of the right foot revealed diffuse soft tissue swelling and absence of visualization of the 4th and 5th metatarsals as well as the talus with only a small residual of the posterior aspect of the calcaneus remaining and remnants of the midfoot articulations  associated with caudal displacement of the distal aspect of the fibula and tibia with surrounding air lucencies extending from the inferior aspect of the tibia and fibula to the sole of the mid and hindfoot. CT of the abdomen and pelvis demonstrated enlarged inguinal and pelvic lymph nodes haziness to the subcutaneous and adipose tissue which can be seen with generalized edema. He has received vancomycin 500 mg IV and magnesium sulfate 1 gm IV x 1 in the ER. He is otherwise in stable condition and he has no other complaints today.     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  Surgery consulted and plans for debridement today  08/08/2024  wound on RLE, anemia hgb was 6, now 8. [facility] consulted and does not want to risk stenting due to need for blood thinners which he most likley will not tolerate until source of bleeding has been determined.      Interval History:      Review of Systems   Constitutional:  Negative for appetite change, fatigue and fever.   Respiratory:  Negative for cough, shortness of breath and wheezing.    Cardiovascular:  Negative for chest pain and leg swelling.   Gastrointestinal:  Negative for abdominal distention, abdominal pain, constipation, diarrhea, nausea and vomiting.   Skin:  Negative for color change, pallor, rash and wound.   Neurological:  Negative for tremors, syncope and headaches.   Psychiatric/Behavioral:  Negative for agitation and behavioral problems.      Objective:     Vital Signs (Most Recent):  Temp: 97.4 °F (36.3 °C) (08/08/24 1500)  Pulse: 106 (08/08/24 1500)  Resp: 18 (08/08/24 1500)  BP: (!) 170/96 (08/08/24 1500)  SpO2: 100 % (08/08/24 1500) Vital Signs (24h Range):  Temp:  [97 °F (36.1 °C)-99.6 °F (37.6 °C)] 97.4 °F (36.3 °C)  Pulse:  [] 106  Resp:  [18-20] 18  SpO2:  [98 %-100 %] 100 %  BP: (122-170)/(66-96) 170/96     Weight: 86.9 kg (191 lb 8 oz)  Body mass  index is 27.48 kg/m².    Intake/Output Summary (Last 24 hours) at 8/8/2024 1526  Last data filed at 8/8/2024 1300  Gross per 24 hour   Intake 3429.27 ml   Output 3900 ml   Net -470.73 ml         Physical Exam  Constitutional:       General: He is not in acute distress.     Appearance: Normal appearance. He is normal weight. He is not ill-appearing.   Cardiovascular:      Rate and Rhythm: Normal rate and regular rhythm.      Pulses: Normal pulses.      Heart sounds: Normal heart sounds.   Pulmonary:      Effort: Pulmonary effort is normal.      Breath sounds: Normal breath sounds.   Abdominal:      General: Abdomen is flat.      Palpations: Abdomen is soft.   Musculoskeletal:      Right lower leg: Edema (right) present.   Skin:     General: Skin is warm and dry.      Findings: Lesion present. No erythema or rash.      Comments: Wound RLE see nurses' notes and photos   Neurological:      General: No focal deficit present.      Mental Status: He is alert and oriented to person, place, and time. Mental status is at baseline.   Psychiatric:         Mood and Affect: Mood normal.         Behavior: Behavior normal.             Significant Labs: All pertinent labs within the past 24 hours have been reviewed.  CBC:   Recent Labs   Lab 08/07/24  0734 08/08/24  0500   WBC 12.21* 10.71   HGB 6.4* 8.0*   HCT 20.6* 24.4*   * 508*     CMP:   Recent Labs   Lab 08/07/24  0734 08/08/24  0516   * 130*   K 4.2 4.1    106   CO2 24 21   BUN 16 14   CREATININE 0.93 0.94   CALCIUM 8.4 8.2*   ALBUMIN 2.1* 1.9*   BILITOT 0.6 0.5   ALKPHOS 223* 195*   AST 15* 16*   ALT 11 10       Significant Imaging: I have reviewed all pertinent imaging results/findings within the past 24 hours.    Assessment/Plan:      * Diabetic ulcer of right heel  An MRI of the right foot shows likley osteomyelitis  Consult general surgery for debridement.  Continue iv abx    Sepsis  Continue vancomycin 15 mg/kg IV every 12 hours, zosyn 3.375 gm IV  every 6 hours, and IV fluids. 2 sets of blood cultures have been obtained in the ER.      PAD (peripheral artery disease)  US arterial abnormal to RLE  JAMAL 1.2  Likley needs revascularization will have to wait until source of bleeding has been determined per [facility]      Acute blood loss anemia  S/p 2 units packed RBCs   Repeat CBC in the morning.   Iron panel from 8/6/2024 was consistent with an iron deficiency anemia.  Got another 2 U PRBC today    Chronic multifocal osteomyelitis of right foot  Acute on chronic  Will need iv abx x 6 weeks      Generalized weakness  Consult PT.    Type II diabetes mellitus  Continue SSI. Hemoglobin A1c    Lab Results   Component Value Date    HGBA1C <4.0 08/06/2024         Hypomagnesemia  improved    Hyponatremia  Continue IV fluids.     Lactic acidosis  Resolved.      Cellulitis of right foot  Continue vancomycin 15 mg/kg IV every 12 hours and zosyn 3.375 gm IV every 6 hours.      VTE Risk Mitigation (From admission, onward)           Ordered     enoxaparin injection 40 mg  Daily         08/06/24 1954     IP VTE HIGH RISK PATIENT  Once         08/06/24 1954                    Discharge Planning   MIRYAM: 8/12/2024     Code Status: Full Code   Is the patient medically ready for discharge?:     Reason for patient still in hospital (select all that apply): Laboratory test, Treatment, and Consult recommendations  Discharge Plan A: Long-term acute care facility (LTAC)                  Marcella Reyes MD  Department of Hospital Medicine   [facility]

## 2024-08-08 NOTE — CONSULTS
Inpatient Nutrition Assessment    Admit Date: 8/6/2024   Total duration of encounter: 2 days   Patient Age: 58 y.o.    Nutrition Recommendation/Prescription     Add Double Protein to 2000 Diabetic Diet as medically appropriate.     Recommend consider MVI with vitamin C and Zinc as medically appropriate.     RD to add Arginaid BID once medically appropriate.     Continue to encourage PO intake and honor patient preferences.    Dietitian will monitor Energy Intake, Glucose/Endocrine Profile, Weight, Weight Change, and Wound Healing and adjust MNT as needed.     Monitoring & Evaluation     Communication of Recommendations: reviewed with nurse and reviewed with patient    Reason Seen: continuous nutrition monitoring, malnutrition screening tool (MST), and physician consult for weight loss/poor appetite.    Nutrition Risk/Follow-Up: moderate (follow-up in 3-5 days)     Next Date to be Seen by RD: 08/13/24  Please consult if re-assessment needed sooner.      Nutrition Assessment     Malnutrition Assessment/Nutrition-Focused Physical Exam             Weight Loss (Malnutrition):  (Per EMR patient with 18.7# weight gain over past year. Patient reports possible 40# weight loss in 2 months.) (08/08/24 9173)     Orbital Region (Subcutaneous Fat Loss): well nourished  Upper Arm Region (Subcutaneous Fat Loss): well nourished        Enigma Region (Muscle Loss): well nourished  Clavicle Bone Region (Muscle Loss): well nourished  Clavicle and Acromion Bone Region (Muscle Loss): well nourished  Scapular Bone Region (Muscle Loss): well nourished                       A minimum of two characteristics is recommended for diagnosis of either severe or non-severe malnutrition.    Chart Review    Malnutrition Screening Tool Results   Have you recently lost weight without trying?: Unsure  Have you been eating poorly because of a decreased appetite?: Yes   MST Score: 3     Diagnosis:  Diabetic ulcer- right heel  Sepsis  Acute blood loss  anemia  Generalized weakness  T2DM  Hypomagnesemia-improved  Hyponatremia  Lactic acidosis- resolved  Cellulitis- right foot    Past Medical History:   Diagnosis Date    Chronic diastolic CHF (congestive heart failure)     Gas gangrene of left lower extremity     History of osteomyelitis of left lower extremity     Hyperlipidemia     Hypertension     Iron deficiency anemia     Type II diabetes mellitus     Wet gangrene of right lower extremity      Past Surgical History:   Procedure Laterality Date    BELOW THE KNEE AMPUTATION Left     FOOT AMPUTATION THROUGH ANKLE Left     FOOT AMPUTATION THROUGH METATARSAL Right 05/24/2023    Procedure: AMPUTATION, FOOT, TRANSMETATARSAL;  Surgeon: Max Duckworth MD;  Location: [facility] OR;  Service: General;  Laterality: Right;  4th and 5th toes    WOUND DEBRIDEMENT Right 05/17/2023    Procedure: DEBRIDEMENT, WOUND;  Surgeon: Max Duckworth MD;  Location: [facility] OR;  Service: General;  Laterality: Right;       Scheduled Medications:  enoxparin, 40 mg, Daily  piperacillin-tazobactam (Zosyn) IV (PEDS and ADULTS) (extended infusion is not appropriate), 4.5 g, Q8H  vancomycin (VANCOCIN) IV (PEDS and ADULTS), 1,750 mg, Q12H    Continuous Infusions:  0.9% NaCl, Last Rate: 100 mL/hr at 08/07/24 0226    PRN Medications:   Current Facility-Administered Medications:     0.9%  NaCl infusion (for blood administration), , Intravenous, Q24H PRN    0.9%  NaCl infusion (for blood administration), , Intravenous, Q24H PRN    acetaminophen, 650 mg, Oral, Q8H PRN    aluminum-magnesium hydroxide-simethicone, 30 mL, Oral, QID PRN    bisacodyL, 10 mg, Rectal, Daily PRN    dextrose 10%, 12.5 g, Intravenous, PRN    dextrose 10%, 25 g, Intravenous, PRN    famotidine, 20 mg, Oral, On Call Procedure    glucagon (human recombinant), 1 mg, Intramuscular, PRN    glucose, 16 g, Oral, PRN    glucose, 24 g, Oral, PRN    glycopyrrolate, 0.2 mg, Intravenous, On Call Procedure    insulin aspart U-100, 0-5 Units,  Subcutaneous, QID (AC + HS) PRN    ketorolac, 15 mg, Intravenous, Q6H PRN    melatonin, 6 mg, Oral, Nightly PRN    midazolam, 2 mg, Intravenous, On Call Procedure    naloxone, 0.02 mg, Intravenous, PRN    ondansetron, 4 mg, Intravenous, Q8H PRN    promethazine, 25 mg, Oral, Q6H PRN    sodium chloride 0.9%, 10 mL, Intravenous, Q12H PRN    Pharmacy to dose Vancomycin consult, , , Once **AND** vancomycin - pharmacy to dose, , Intravenous, pharmacy to manage frequency    Calorie Containing IV Medications: no significant kcals from medications at this time    Nutrition-Related Labs:   Recent Labs   Lab 08/06/24  1355 08/07/24  0734 08/08/24  0500 08/08/24  0516   * 135*  --  130*   K 4.5 4.2  --  4.1   CALCIUM 8.4 8.4  --  8.2*   MG 1.70* 2.00  --  1.70*   CO2 20* 24  --  21   BUN 20 16  --  14   CREATININE 1.09 0.93  --  0.94   EGFRNORACEVR 80 >90  --  >90   GLUCOSE 188* 109  --  179*   BILITOT 0.5 0.6  --  0.5   ALKPHOS 264* 223*  --  195*   ALT 19 11  --  10   AST 29 15*  --  16*   ALBUMIN 2.8* 2.1*  --  1.9*   CRP 8.30*  --   --   --    HGBA1C <4.0  --   --   --    WBC 13.49* 12.21* 10.71  --    HGB 5.1* 6.4* 8.0*  --    HCT 17.7* 20.6* 24.4*  --      CrCl:    Lab Value: 90.6    Date: 8/8    Nutrition Orders:  Diet diabetic Double Protein; 2000 Calorie      Appetite/Oral Intake: good/% of meals  Factors Affecting Nutritional Intake: none identified  Social Needs Impacting Access to Food:  Case management referred patient to [place].  Food Insecurity: Food Insecurity Present (8/7/2024)    Hunger Vital Sign     Worried About Running Out of Food in the Last Year: Often true     Ran Out of Food in the Last Year: Often true     Food/Confucianism/Cultural Preferences: Food Preferences: None / Spiritual, Cultural Beliefs, Confucianism Practices, Values that Affect Care:  (None mentioned)  Food Allergies:   Review of patient's allergies indicates:   Allergen Reactions    Dilaudid [hydromorphone]     Iodinated contrast  "media          Skin Integrity: wound  Wound(s):      Wound 24 Other (comment) Right plantar;distal Heel #1-Tissue loss description: Full thickness       Wound 24 Other (comment) Right lateral Calf #2-Tissue loss description: Full thickness     Overview/Hospital Course:    (): Patient reports good appetite now but has been poor for about 2-3 weeks with possible 40# weight loss over 2 months. Per EMR patient weighed 77.5 kg on 23, CBW- 86.9 kg showing a possible 18.7# weight gain. Patient has averaged 100%-2 meals with double protein. GI: WDL except GI symptoms and INCONTINENT/LBM-, : WDL, FUNCTIONAL SCREEN:Eatin - independent, Nutrition: 3-->adequate,Jarrell Score: 17, 4+ RLE EDEMA NOTED, 24 HR I/O: 2469/1800.     Anthropometrics    Height: 5' 10" (177.8 cm) Height Method: Estimated  Last Weight: 86.9 kg (191 lb 8 oz) (24 1901) Weight Method: Bed Scale  BMI (Calculated): 27.5  BMI Classification: overweight (BMI 25-29.9)        Ideal Body Weight (IBW), Male: 166 lb     % Ideal Body Weight, Male (lb): 112.23 %                          Usual Weight Provided By: patient and EMR weight history    Wt Readings from Last 5 Encounters:   24 86.9 kg (191 lb 8 oz)   23 77.5 kg (170 lb 13.7 oz)   23 83 kg (183 lb)     Weight Change(s) Since Admission:  Admit Weight: 74.8 kg (165 lb) (24 1358)      Estimated Needs    Weight Used For Calorie Calculations: 78.1 kg (172 lb 2.9 oz) (ABW)  Energy Calorie Requirements (kcal): 3682-4308 KCAL (24-28 KCAL/KG ABW)  Energy Need Method: Kcal/kg  Weight Used For Protein Calculations: 78.1 kg (172 lb 2.9 oz) (ABW)  Protein Requirements:  GM PRO (1.1-1.3 GM/KG ABW)  Fluid Requirements (mL): 2735 ML H2O (35 ML/KG ABW)  CHO Requirement: 188-219 GM CHO/DAY (40% KCAL FROM CHO)     Enteral Nutrition    Patient not receiving enteral nutrition at this time.    Parenteral Nutrition    Patient not receiving parenteral nutrition " support at this time.    Evaluation of Received Nutrient Intake    Calories: meeting estimated needs  Protein: exceeding estimated needs    Patient Education    Not applicable.         Nutrition Diagnosis     PES: Increased nutrient needs (protein) related to increased protein energy demand for wound healing as evidenced by conditions associated with diagnosis or treatment decubitus ulcer . (new)    PES: Inadequate energy intake related to inability to consume sufficient nutrients as evidenced by weight loss. (new)      Nutrition Interventions     Intervention(s): general/healthful diet, modified composition of meals/snacks, multivitamin/mineral supplement therapy, and collaboration with other providers    Goal: Meet Greater than 75% of nutritional needs by discharge. (new)    Goal: Maintain weight throughout hospitalization. (new)    Nutrition Goals & Monitoring     Dietitian will monitor: food and beverage intake, energy intake, weight, weight change, and glucose/endocrine profile  Discharge planning:    too early to determine; pending clinical course  Next Date to be Seen by RD: 08/13/24  Please consult if re-assessment needed sooner.

## 2024-08-08 NOTE — SUBJECTIVE & OBJECTIVE
Interval History:      Review of Systems   Constitutional:  Negative for appetite change, fatigue and fever.   Respiratory:  Negative for cough, shortness of breath and wheezing.    Cardiovascular:  Negative for chest pain and leg swelling.   Gastrointestinal:  Negative for abdominal distention, abdominal pain, constipation, diarrhea, nausea and vomiting.   Skin:  Negative for color change, pallor, rash and wound.   Neurological:  Negative for tremors, syncope and headaches.   Psychiatric/Behavioral:  Negative for agitation and behavioral problems.      Objective:     Vital Signs (Most Recent):  Temp: 97.4 °F (36.3 °C) (08/08/24 1500)  Pulse: 106 (08/08/24 1500)  Resp: 18 (08/08/24 1500)  BP: (!) 170/96 (08/08/24 1500)  SpO2: 100 % (08/08/24 1500) Vital Signs (24h Range):  Temp:  [97 °F (36.1 °C)-99.6 °F (37.6 °C)] 97.4 °F (36.3 °C)  Pulse:  [] 106  Resp:  [18-20] 18  SpO2:  [98 %-100 %] 100 %  BP: (122-170)/(66-96) 170/96     Weight: 86.9 kg (191 lb 8 oz)  Body mass index is 27.48 kg/m².    Intake/Output Summary (Last 24 hours) at 8/8/2024 1526  Last data filed at 8/8/2024 1300  Gross per 24 hour   Intake 3429.27 ml   Output 3900 ml   Net -470.73 ml         Physical Exam  Constitutional:       General: He is not in acute distress.     Appearance: Normal appearance. He is normal weight. He is not ill-appearing.   Cardiovascular:      Rate and Rhythm: Normal rate and regular rhythm.      Pulses: Normal pulses.      Heart sounds: Normal heart sounds.   Pulmonary:      Effort: Pulmonary effort is normal.      Breath sounds: Normal breath sounds.   Abdominal:      General: Abdomen is flat.      Palpations: Abdomen is soft.   Musculoskeletal:      Right lower leg: Edema (right) present.   Skin:     General: Skin is warm and dry.      Findings: Lesion present. No erythema or rash.      Comments: Wound RLE see nurses' notes and photos   Neurological:      General: No focal deficit present.      Mental Status: He is  alert and oriented to person, place, and time. Mental status is at baseline.   Psychiatric:         Mood and Affect: Mood normal.         Behavior: Behavior normal.             Significant Labs: All pertinent labs within the past 24 hours have been reviewed.  CBC:   Recent Labs   Lab 08/07/24  0734 08/08/24  0500   WBC 12.21* 10.71   HGB 6.4* 8.0*   HCT 20.6* 24.4*   * 508*     CMP:   Recent Labs   Lab 08/07/24  0734 08/08/24  0516   * 130*   K 4.2 4.1    106   CO2 24 21   BUN 16 14   CREATININE 0.93 0.94   CALCIUM 8.4 8.2*   ALBUMIN 2.1* 1.9*   BILITOT 0.6 0.5   ALKPHOS 223* 195*   AST 15* 16*   ALT 11 10       Significant Imaging: I have reviewed all pertinent imaging results/findings within the past 24 hours.

## 2024-08-08 NOTE — PROGRESS NOTES
Pharmacokinetic Assessment Follow Up: IV Vancomycin    Vancomycin serum concentration assessment(s):    The trough level was drawn correctly and can be used to guide therapy at this time. The measurement is below the desired definitive target range of 15 to 20 mcg/mL.    Vancomycin Regimen Plan:    Change regimen to Vancomycin 1750 mg IV every 12 hours with next serum trough concentration measured at 1030 prior to 1130 dose on 08/09/24    Drug levels (last 3 results):  Recent Labs   Lab Result Units 08/07/24  0906 08/08/24  0931   Vancomycin Random ug/ml 6.9  --    Vancomycin Trough ug/ml  --  10.9       Pharmacy will continue to follow and monitor vancomycin.    Please contact pharmacy at extension [phone] for questions regarding this assessment.    Thank you for the consult,   Tiny Smith       Patient brief summary:  Honorio Robb is a 58 y.o. male initiated on antimicrobial therapy with IV Vancomycin for treatment of bone/joint infection    Drug Allergies:   Review of patient's allergies indicates:   Allergen Reactions    Dilaudid [hydromorphone]     Iodinated contrast media        Actual Body Weight:   86.9kg    Renal Function:   Estimated Creatinine Clearance: 90.6 mL/min (based on SCr of 0.94 mg/dL).,     Dialysis Method (if applicable):  N/A    CBC (last 72 hours):  Recent Labs   Lab Result Units 08/06/24  1355 08/07/24  0734 08/08/24  0500   WBC x10(3)/mcL 13.49* 12.21* 10.71   Hgb g/dL 5.1* 6.4* 8.0*   Hemoglobin A1c % <4.0  --   --    Hct % 17.7* 20.6* 24.4*   Platelet x10(3)/mcL 686* 474* 508*   Mono % % 6.8 7.5 9.4   Eos % % 0.3* 1.4 2.1   Basophil % % 0.1 0.4 0.5       Metabolic Panel (last 72 hours):  Recent Labs   Lab Result Units 08/06/24  1355 08/06/24  1731 08/07/24  0734 08/08/24  0516   Sodium mmol/L 130*  --  135* 130*   Potassium mmol/L 4.5  --  4.2 4.1   Chloride mmol/L 101  --  107 106   CO2 mmol/L 20*  --  24 21   Glucose mg/dL 188*  --  109 179*   Glucose, UA   --  Negative  --   --     Blood Urea Nitrogen mg/dL 20  --  16 14   Creatinine mg/dL 1.09  --  0.93 0.94   Albumin g/dL 2.8*  --  2.1* 1.9*   Bilirubin Total mg/dL 0.5  --  0.6 0.5   ALP unit/L 264*  --  223* 195*   AST unit/L 29  --  15* 16*   ALT unit/L 19  --  11 10   Magnesium Level mg/dL 1.70*  --  2.00 1.70*       Vancomycin Administrations:  vancomycin given in the last 96 hours                     vancomycin 1,250 mg in D5W 250 mL IVPB (Vial-Mate) (mg) 1,250 mg New Bag 08/07/24 2131     1,250 mg New Bag  1032    vancomycin (VANCOCIN) 500 mg in D5W 100 mL IVPB (MB+) (mg) 500 mg New Bag 08/06/24 2041    vancomycin (VANCOCIN) 1,000 mg in D5W 250 mL IVPB (Vial-Mate) (mg) 1,000 mg New Bag 08/06/24 1505                    Microbiologic Results:  Microbiology Results (last 7 days)       Procedure Component Value Units Date/Time    Wound Culture [[id number]]  (Abnormal) Collected: 08/06/24 1407    Order Status: Completed Specimen: Drainage from Foot, Right Updated: 08/08/24 1003     Wound Culture Moderate PRESUMPTIVE PROTEUS SPECIES      Moderate Beta hemolytic streptococci    Narrative:      Due to overgrowth of Proteus identification and susceptibility cannot be performed on Beta streptococcus.    Blood Culture #1 **CANNOT BE ORDERED STAT** [[id number]] Collected: 08/06/24 1355    Order Status: Completed Specimen: Blood from Arm, Left Updated: 08/07/24 1500     Blood Culture No Growth At 24 Hours    Blood Culture #2 **CANNOT BE ORDERED STAT** [[id number]] Collected: 08/06/24 1418    Order Status: Completed Specimen: Blood from Antecubital, Right Updated: 08/07/24 1500     Blood Culture No Growth At 24 Hours

## 2024-08-08 NOTE — CONSULTS
[facility]    Cardiology  Consult Note    Patient Name: Honorio Robb  MRN: [id number]  Admission Date: 8/6/2024  Hospital Length of Stay: 2 days  Code Status: Full Code   Attending Provider: Tahir Ha MD   Consulting Provider: JASON Rokc  Primary Care Physician: Winston Lopez MD  Principal Problem:Diabetic ulcer of right heel    Patient information was obtained from patient, past medical records, ER records, and primary team.     Subjective:     Chief Complaint/Reason for Consult: CLI    HPI: 58 year old known to [facility] only through hospital services (seen in 2023) with PMH DM, HTN, HLP. He has had R LE wound x 1 year and admitted for further manaagement    [facility] consulted to evaluate arterial flow  Abnormal LE arterial US  L BKA      VSS, NAD. Anemia requiring transfusion. Denies CP or SOB. Denies melena        Past Medical History:   Diagnosis Date    Chronic diastolic CHF (congestive heart failure)     Gas gangrene of left lower extremity     History of osteomyelitis of left lower extremity     Hyperlipidemia     Hypertension     Iron deficiency anemia     Type II diabetes mellitus     Wet gangrene of right lower extremity      Past Surgical History:   Procedure Laterality Date    BELOW THE KNEE AMPUTATION Left     FOOT AMPUTATION THROUGH ANKLE Left     FOOT AMPUTATION THROUGH METATARSAL Right 05/24/2023    Procedure: AMPUTATION, FOOT, TRANSMETATARSAL;  Surgeon: Max Duckworth MD;  Location: [facility] OR;  Service: General;  Laterality: Right;  4th and 5th toes    WOUND DEBRIDEMENT Right 05/17/2023    Procedure: DEBRIDEMENT, WOUND;  Surgeon: Max Duckworth MD;  Location: [facility] OR;  Service: General;  Laterality: Right;     Review of patient's allergies indicates:   Allergen Reactions    Dilaudid [hydromorphone]     Iodinated contrast media      No current facility-administered medications on file prior to encounter.     No current outpatient medications on file prior to  encounter.     Family History       Problem Relation (Age of Onset)    Diabetes Mother    Diabetes Mellitus Sister          Tobacco Use    Smoking status: Never     Passive exposure: Never    Smokeless tobacco: Never   Substance and Sexual Activity    Alcohol use: Never    Drug use: Never    Sexual activity: Not on file       Review of Systems   Constitutional: Negative.    HENT: Negative.     Respiratory: Negative.     Cardiovascular: Negative.    Gastrointestinal: Negative.    Skin:  Positive for color change and wound.   Neurological: Negative.    Hematological:  Bruises/bleeds easily.   Psychiatric/Behavioral: Negative.       Objective:     Vital Signs (Most Recent):  Temp: 97 °F (36.1 °C) (08/08/24 0701)  Pulse: 87 (08/08/24 0732)  Resp: 18 (08/08/24 0732)  BP: 125/70 (08/08/24 0701)  SpO2: 99 % (08/08/24 0732) Vital Signs (24h Range):  Temp:  [97 °F (36.1 °C)-99.6 °F (37.6 °C)] 97 °F (36.1 °C)  Pulse:  [] 87  Resp:  [18-20] 18  SpO2:  [98 %-99 %] 99 %  BP: (122-156)/(66-78) 125/70   Weight: 86.9 kg (191 lb 8 oz)  Body mass index is 27.48 kg/m².  SpO2: 99 %       Intake/Output Summary (Last 24 hours) at 8/8/2024 1333  Last data filed at 8/8/2024 1154  Gross per 24 hour   Intake 2469.27 ml   Output 2800 ml   Net -330.73 ml     Lines/Drains/Airways       Peripheral Intravenous Line  Duration                  Peripheral IV - Single Lumen 08/06/24 1406 18 G Left Forearm 1 day         Peripheral IV - Single Lumen 08/06/24 1411 20 G Left Antecubital 1 day                  Significant Labs:   Chemistries:   Recent Labs   Lab 08/06/24  1355 08/06/24  1704 08/07/24  0734 08/08/24  0516   *  --  135* 130*   K 4.5  --  4.2 4.1     --  107 106   CO2 20*  --  24 21   BUN 20  --  16 14   CREATININE 1.09  --  0.93 0.94   CALCIUM 8.4  --  8.4 8.2*   BILITOT 0.5  --  0.6 0.5   ALKPHOS 264*  --  223* 195*   ALT 19  --  11 10   AST 29  --  15* 16*   GLUCOSE 188*  --  109 179*   MG 1.70*  --  2.00 1.70*  "  TROPONINI <0.012 <0.012  --   --         CBC/Anemia Labs: Coags:    Recent Labs   Lab 08/06/24  1355 08/07/24  0734 08/08/24  0500   WBC 13.49* 12.21* 10.71   HGB 5.1* 6.4* 8.0*   HCT 17.7* 20.6* 24.4*   * 474* 508*   MCV 76.0* 79.5 80.8   RDW 24.3 21.2 19.3   IRON 21*  --   --    FERRITIN 724*  --   --     No results for input(s): "PT", "INR", "APTT" in the last 168 hours.     Significant Imaging:  Imaging Results              MRI Foot Toes WO Contrast RT (Final result)  Result time 08/07/24 09:13:52      Final result by Juan Becker MD (08/07/24 09:13:52)                   Impression:      Large ulcer in the heel region likely extending to the cortical margin of the inferior tibia and fibula with severe edema involving the distal tibia and fibula and suspected marginal erosive changes consistent with osteomyelitis.    Partial destruction of the midfoot bones secondary to chronic infection versus neuropathic joint.  Similar appearing edema throughout the residual tarsal bone fragments including the navicular, cuneiform bones, and proximal 3rd metatarsal also suspicious for osteomyelitis.      Electronically signed by: Kianna Becker MD  Date:    08/07/2024  Time:    09:13               Narrative:    EXAMINATION:  MRI FOOT TOES WO CONTRAST RT    CLINICAL HISTORY:  Osteomyelitis, foot;    TECHNIQUE:  MRI of the right forefoot performed without contrast using routine protocol.    COMPARISON:  Radiograph 08/06/2024    FINDINGS:  Markedly advanced changes related to neuropathic joint including extensive bone destruction of the hindfoot and midfoot bones, disarticulation of the hindfoot with essentially complete destruction of the talus and anterior calcaneus.  There is a large ulcer in the calcaneal region which appears to approach the inferior cortical margin of the distal tibia throughout the distal.  There is suspected severe edema tibial diaphysis and metaphyseal region consistent with osteomyelitis.  " Similar signal changes noted involving the distal fibula.  Sclerosis and edema also noted involving residual tarsal bone fragments including the navicular, cuneiform bones more significant laterally, proximal 3rd metatarsal.  4th metatarsa normal signal.  L. Distal phalanges appear to demonstrate    Extensive severe generalized soft tissue edema.                                       CT Abdomen Pelvis  Without Contrast (Final result)  Result time 08/06/24 16:25:18      Final result by Gregor Rodriguez MD (08/06/24 16:25:18)                   Impression:        1. Enlarged inguinal and pelvic lymph nodes.    2.  Haziness to the subcutaneous and adipose tissue which can be seen with generalized edema.    n/a    CATEGORY: n/a    The following dose reduction techniques are used for all CT at [facility]:    1.   Automated exposure control.    2.   Adjustment of the mA and/or kV according to patient size.    3.   Use of iterative reconstruction technique.      Electronically signed by: Gregor Rodriguez  Date:    08/06/2024  Time:    16:25               Narrative:    EXAMINATION:  CT ABDOMEN PELVIS WITHOUT CONTRAST    CLINICAL HISTORY:  GI bleed;With GI bleed;    TECHNIQUE:  Low dose axial images, sagittal and coronal reformations were obtained from the lung bases to the pubic symphysis.  Oral contrast was not administered.    COMPARISON:  None    FINDINGS:  Liver:  No clinically significant abnormalities noted.    Gallbladder/Biliary System:  No clinically significant abnormalities noted.    Spleen:  No clinically significant abnormalities noted.    Adrenal glands:  No clinically significant abnormalities noted.    Pancreas:  No clinically significant abnormalities noted.    Kidneys/Urinary Tract:  No clinically significant abnormalities noted.    Urinary bladder:  No clinically significant abnormalities noted.    Prostate gland/uterus and ovaries:  No clinically significant abnormalities noted.    GI  tract:  Fluid is present within the gastric lumen.    Vascular structures:  No clinically significant abnormalities noted.    Musculoskeletal structures:  No clinically significant abnormalities noted.    Miscellaneous: There is haziness to the subcutaneous and adipose tissue which can be seen with generalized edema.  Enlarged lymph nodes are present in the inguinal and pelvic region.                                       X-Ray Chest 1 View (Final result)  Result time 08/06/24 16:47:46      Final result by Gregor Rodriguez MD (08/06/24 16:47:46)                   Impression:      Borderline enlarged heart with vascular congestion and mild interstitial edema.      Electronically signed by: Gregor Rodriguez  Date:    08/06/2024  Time:    16:47               Narrative:    EXAMINATION:  XR CHEST 1 VIEW    CLINICAL HISTORY:  Fever, unspecified    TECHNIQUE:  Single frontal view of the chest was performed.    COMPARISON:  05/26/2023    FINDINGS:  Mild interstitial edema is present.  A skinfold is noted overlying the left apex.    The cardiac silhouette is borderline enlarged with vascular congestion.  The hilar and mediastinal contours are unremarkable.    Bones are intact.                                       X-Ray Foot Complete Right (Final result)  Result time 08/06/24 17:08:08      Final result by Gregor Rodriguez MD (08/06/24 17:08:08)                   Impression:      Extensive findings as described above for which clinical correlation is recommended along with surgical consultation.      Electronically signed by: Gregor Rodriguez  Date:    08/06/2024  Time:    17:08               Narrative:    EXAMINATION:  XR FOOT COMPLETE 3 VIEW RIGHT    CLINICAL HISTORY:  . Osteomyelitis, unspecified    TECHNIQUE:  AP, lateral, and oblique views of the right foot were performed.    COMPARISON:  05/16/2023    FINDINGS:  Diffuse soft tissue swelling is present.  There is absence of visualization of the 4th and 5th metatarsals as  well as the talus with only a small residual of the posterior aspect of the calcaneus remaining and remnants of the midfoot articulations associated with caudal displacement of the distal aspect of the fibula and tibia with surrounding air lucencies extending from the inferior aspect of the tibia and fibula to the sole of the mid and hindfoot.  I suspect the findings to be related to active osteomyelitis and cellulitis and possible abscess and/or a gangrenous process with probable active osteomyelitis superimposed a chronic osteomyelitis and postsurgical findings and probable endstage the Charcot findings.                                    EKG:     Telemetry:  SR    Physical Exam  Constitutional:       General: He is not in acute distress.  HENT:      Mouth/Throat:      Mouth: Mucous membranes are moist.   Eyes:      Extraocular Movements: Extraocular movements intact.   Cardiovascular:      Rate and Rhythm: Normal rate and regular rhythm.      Heart sounds: No murmur heard.  Pulmonary:      Effort: Pulmonary effort is normal.   Abdominal:      Palpations: Abdomen is soft.   Musculoskeletal:      Comments: L BKA   Skin:     General: Skin is warm.      Comments:  R LE wound, pictures reviewed   Neurological:      General: No focal deficit present.      Mental Status: He is alert.      Gait: Gait abnormal.   Psychiatric:         Mood and Affect: Mood normal.     Home Medications:   No current facility-administered medications on file prior to encounter.     No current outpatient medications on file prior to encounter.     Current Schedule Inpatient Medications:   enoxparin  40 mg Subcutaneous Daily    piperacillin-tazobactam (Zosyn) IV (PEDS and ADULTS) (extended infusion is not appropriate)  4.5 g Intravenous Q8H    vancomycin (VANCOCIN) IV (PEDS and ADULTS)  1,750 mg Intravenous Q12H     Continuous Infusions:   0.9% NaCl   Intravenous Continuous 100 mL/hr at 08/07/24 0226 New Bag at 08/07/24 0226     Assessment:    PAD with CLI RLE, has L BKA    Abnormal arterial US  Anemia requiring transfusion  HTN  HLP  DM      Plan:   Discussed with Dr Ashby   No plans for invasive work up due to anemia requiring transfusion  Recommend work up for anemia  Consider CTA to further assess RLE - allergy to iodine  Abx per primary team        Thank you for your consult.     Laine Zee, JOSEPHINEP  Cardiology  [facility]  08/08/2024

## 2024-08-08 NOTE — ASSESSMENT & PLAN NOTE
An MRI of the right foot shows likley osteomyelitis  Consult general surgery for debridement.  Continue iv abx

## 2024-08-08 NOTE — PT/OT/SLP PROGRESS
Physical Therapy      Patient Name:  Honorio Robb   MRN:  [id number]    Patient not seen today secondary to Patient ill (Comment) (pt states vision is blurry right now and he is not feeling well d/t npo status yesterday.). Will follow-up 8/9/24.

## 2024-08-08 NOTE — PLAN OF CARE
Rounded with . DC plan discussed with patient and LTAC recommended. Patient agrees and states he was at [facility] last year and would like to go back there. Referral  made to [place] at [facility]

## 2024-08-09 LAB
ACANTHOCYTES (OLG): SLIGHT
ALBUMIN SERPL-MCNC: 1.9 G/DL (ref 3.4–5)
ALBUMIN/GLOB SERPL: 0.5 RATIO
ALP SERPL-CCNC: 182 UNIT/L (ref 50–144)
ALT SERPL-CCNC: 8 UNIT/L (ref 1–45)
ANION GAP SERPL CALC-SCNC: 2 MEQ/L (ref 2–13)
ANISOCYTOSIS BLD QL SMEAR: ABNORMAL
AST SERPL-CCNC: 16 UNIT/L (ref 17–59)
BACTERIA WND CULT: ABNORMAL
BACTERIA WND CULT: ABNORMAL
BASOPHILS # BLD AUTO: 0.01 X10(3)/MCL (ref 0.01–0.08)
BASOPHILS NFR BLD AUTO: 0.1 % (ref 0.1–1.2)
BILIRUB SERPL-MCNC: 0.4 MG/DL (ref 0–1)
BUN SERPL-MCNC: 16 MG/DL (ref 7–20)
CALCIUM SERPL-MCNC: 8.3 MG/DL (ref 8.4–10.2)
CHLORIDE SERPL-SCNC: 109 MMOL/L (ref 98–110)
CO2 SERPL-SCNC: 23 MMOL/L (ref 21–32)
CREAT SERPL-MCNC: 0.86 MG/DL (ref 0.66–1.25)
CREAT/UREA NIT SERPL: 19 (ref 12–20)
ELLIPTOCYTOSIS (OHS): ABNORMAL
EOSINOPHIL # BLD AUTO: 0.27 X10(3)/MCL (ref 0.04–0.54)
EOSINOPHIL NFR BLD AUTO: 2.9 % (ref 0.7–7)
ERYTHROCYTE [DISTWIDTH] IN BLOOD BY AUTOMATED COUNT: 20.5 %
GFR SERPLBLD CREATININE-BSD FMLA CKD-EPI: >90 ML/MIN/1.73/M2
GLOBULIN SER-MCNC: 3.5 GM/DL (ref 2–3.9)
GLUCOSE SERPL-MCNC: 114 MG/DL (ref 70–115)
HCT VFR BLD AUTO: 26.4 % (ref 36–52)
HGB BLD-MCNC: 8.1 G/DL (ref 13–18)
IMM GRANULOCYTES # BLD AUTO: 0.04 X10(3)/MCL (ref 0–0.03)
IMM GRANULOCYTES NFR BLD AUTO: 0.4 % (ref 0–0.5)
LYMPHOCYTES # BLD AUTO: 0.72 X10(3)/MCL (ref 1.32–3.57)
LYMPHOCYTES NFR BLD AUTO: 7.8 % (ref 20–55)
MCH RBC QN AUTO: 25.4 PG (ref 27–34)
MCHC RBC AUTO-ENTMCNC: 30.7 G/DL (ref 31–37)
MCV RBC AUTO: 82.8 FL (ref 79–99)
MONOCYTES # BLD AUTO: 0.75 X10(3)/MCL (ref 0.3–0.82)
MONOCYTES NFR BLD AUTO: 8.1 % (ref 4.7–12.5)
NEUTROPHILS # BLD AUTO: 7.44 X10(3)/MCL (ref 1.78–5.38)
NEUTROPHILS NFR BLD AUTO: 80.7 % (ref 37–73)
PLATELET # BLD AUTO: 575 X10(3)/MCL (ref 140–371)
PLATELET # BLD EST: ABNORMAL 10*3/UL
PMV BLD AUTO: ABNORMAL FL
POCT GLUCOSE: 102 MG/DL (ref 70–110)
POCT GLUCOSE: 128 MG/DL (ref 70–110)
POCT GLUCOSE: 138 MG/DL (ref 70–110)
POCT GLUCOSE: 197 MG/DL (ref 70–110)
POCT GLUCOSE: 231 MG/DL (ref 70–110)
POIKILOCYTOSIS BLD QL SMEAR: ABNORMAL
POTASSIUM SERPL-SCNC: 4.3 MMOL/L (ref 3.5–5.1)
PROT SERPL-MCNC: 5.4 GM/DL (ref 6.3–8.2)
RBC # BLD AUTO: 3.19 X10(6)/MCL (ref 4–6)
RBC MORPH BLD: ABNORMAL
SCHISTOCYTE (OLG): ABNORMAL
SODIUM SERPL-SCNC: 134 MMOL/L (ref 136–145)
TEAR DROP CELL (OLG): ABNORMAL
VANCOMYCIN TROUGH SERPL-MCNC: 15.7 UG/ML (ref 10–20)
WBC # BLD AUTO: 9.23 X10(3)/MCL (ref 4–11.5)

## 2024-08-09 PROCEDURE — 21400001 HC TELEMETRY ROOM

## 2024-08-09 PROCEDURE — 63600175 PHARM REV CODE 636 W HCPCS: Performed by: FAMILY MEDICINE

## 2024-08-09 PROCEDURE — 97530 THERAPEUTIC ACTIVITIES: CPT

## 2024-08-09 PROCEDURE — 94761 N-INVAS EAR/PLS OXIMETRY MLT: CPT

## 2024-08-09 PROCEDURE — 80053 COMPREHEN METABOLIC PANEL: CPT | Performed by: INTERNAL MEDICINE

## 2024-08-09 PROCEDURE — 63600175 PHARM REV CODE 636 W HCPCS: Performed by: INTERNAL MEDICINE

## 2024-08-09 PROCEDURE — 99900035 HC TECH TIME PER 15 MIN (STAT)

## 2024-08-09 PROCEDURE — 25000003 PHARM REV CODE 250: Performed by: FAMILY MEDICINE

## 2024-08-09 PROCEDURE — 85025 COMPLETE CBC W/AUTO DIFF WBC: CPT | Performed by: INTERNAL MEDICINE

## 2024-08-09 PROCEDURE — 80202 ASSAY OF VANCOMYCIN: CPT | Performed by: INTERNAL MEDICINE

## 2024-08-09 PROCEDURE — 25000003 PHARM REV CODE 250: Performed by: INTERNAL MEDICINE

## 2024-08-09 PROCEDURE — 25500020 PHARM REV CODE 255: Performed by: INTERNAL MEDICINE

## 2024-08-09 PROCEDURE — 36415 COLL VENOUS BLD VENIPUNCTURE: CPT | Performed by: INTERNAL MEDICINE

## 2024-08-09 RX ORDER — POLYETHYLENE GLYCOL 3350 17 G/17G
17 POWDER, FOR SOLUTION ORAL
Status: COMPLETED | OUTPATIENT
Start: 2024-08-09 | End: 2024-08-09

## 2024-08-09 RX ORDER — BISACODYL 5 MG
10 TABLET, DELAYED RELEASE (ENTERIC COATED) ORAL 2 TIMES DAILY
Status: COMPLETED | OUTPATIENT
Start: 2024-08-09 | End: 2024-08-09

## 2024-08-09 RX ORDER — DIPHENHYDRAMINE HYDROCHLORIDE 50 MG/ML
50 INJECTION INTRAMUSCULAR; INTRAVENOUS
Status: DISCONTINUED | OUTPATIENT
Start: 2024-08-09 | End: 2024-08-14 | Stop reason: HOSPADM

## 2024-08-09 RX ORDER — INSULIN ASPART 100 [IU]/ML
0-5 INJECTION, SOLUTION INTRAVENOUS; SUBCUTANEOUS
Status: DISCONTINUED | OUTPATIENT
Start: 2024-08-09 | End: 2024-08-14 | Stop reason: HOSPADM

## 2024-08-09 RX ADMIN — IRON SUCROSE 100 MG: 20 INJECTION, SOLUTION INTRAVENOUS at 02:08

## 2024-08-09 RX ADMIN — SODIUM CHLORIDE: 9 INJECTION, SOLUTION INTRAVENOUS at 10:08

## 2024-08-09 RX ADMIN — POLYETHYLENE GLYCOL 3350 17 G: 17 POWDER, FOR SOLUTION ORAL at 03:08

## 2024-08-09 RX ADMIN — PIPERACILLIN SODIUM AND TAZOBACTAM SODIUM 4.5 G: 4; .5 INJECTION, POWDER, LYOPHILIZED, FOR SOLUTION INTRAVENOUS at 06:08

## 2024-08-09 RX ADMIN — POLYETHYLENE GLYCOL 3350 17 G: 17 POWDER, FOR SOLUTION ORAL at 09:08

## 2024-08-09 RX ADMIN — IOHEXOL 150 ML: 350 INJECTION, SOLUTION INTRAVENOUS at 08:08

## 2024-08-09 RX ADMIN — POLYETHYLENE GLYCOL 3350 17 G: 17 POWDER, FOR SOLUTION ORAL at 05:08

## 2024-08-09 RX ADMIN — BISACODYL 10 MG: 5 TABLET, COATED ORAL at 09:08

## 2024-08-09 RX ADMIN — BISACODYL 10 MG: 5 TABLET, COATED ORAL at 02:08

## 2024-08-09 RX ADMIN — POLYETHYLENE GLYCOL 3350 17 G: 17 POWDER, FOR SOLUTION ORAL at 04:08

## 2024-08-09 RX ADMIN — INSULIN ASPART 1 UNITS: 100 INJECTION, SOLUTION INTRAVENOUS; SUBCUTANEOUS at 11:08

## 2024-08-09 RX ADMIN — VANCOMYCIN HYDROCHLORIDE 1750 MG: 1 INJECTION, POWDER, LYOPHILIZED, FOR SOLUTION INTRAVENOUS at 12:08

## 2024-08-09 RX ADMIN — POLYETHYLENE GLYCOL 3350 17 G: 17 POWDER, FOR SOLUTION ORAL at 07:08

## 2024-08-09 RX ADMIN — INSULIN ASPART 2 UNITS: 100 INJECTION, SOLUTION INTRAVENOUS; SUBCUTANEOUS at 06:08

## 2024-08-09 RX ADMIN — VANCOMYCIN HYDROCHLORIDE 1750 MG: 1 INJECTION, POWDER, LYOPHILIZED, FOR SOLUTION INTRAVENOUS at 10:08

## 2024-08-09 RX ADMIN — SODIUM CHLORIDE: 9 INJECTION, SOLUTION INTRAVENOUS at 07:08

## 2024-08-09 RX ADMIN — PIPERACILLIN SODIUM AND TAZOBACTAM SODIUM 4.5 G: 4; .5 INJECTION, POWDER, LYOPHILIZED, FOR SOLUTION INTRAVENOUS at 03:08

## 2024-08-09 RX ADMIN — PIPERACILLIN SODIUM AND TAZOBACTAM SODIUM 4.5 G: 4; .5 INJECTION, POWDER, LYOPHILIZED, FOR SOLUTION INTRAVENOUS at 09:08

## 2024-08-09 RX ADMIN — DIPHENHYDRAMINE HYDROCHLORIDE 50 MG: 50 INJECTION INTRAMUSCULAR; INTRAVENOUS at 08:08

## 2024-08-09 RX ADMIN — PREDNISONE 50 MG: 20 TABLET ORAL at 08:08

## 2024-08-09 NOTE — PROGRESS NOTES
[facility]  [facility] Medicine  Progress Note    Patient Name: Honorio Robb  MRN: [id number]  Patient Class: IP- Inpatient   Admission Date: 8/6/2024  Length of Stay: 3 days  Attending Physician: Tahir Ha MD  Primary Care Provider: Winston Lopez MD        Subjective:     Principal Problem:Diabetic ulcer of right heel        HPI:  Mr. Robb is a 58 year old  male with a history of type II diabetes mellitus, HTN, chronic diastolic CHF, and hyperlipidemia who presented to the ER today with a 1 day history of generalized weakness. He was in his normal state of health until 2 weeks ago when he noticed right foot swelling due to a right diabetic heel ulcer. He has had bleeding and drainage from his right foot, fevers, and chills but he denies any chest pain, shortness of breath, nausea, vomiting, or abdominal pain. He developed generalized weakness yesterday which worsened today and he presented to [facility] for further evaluation. Of note, he has a history of chronic right heel ulcer with necrotic wound s/p excisional debridement on 5/17/2023 and transmetatarsal amputation of the 4th and 5th digits on 5/24/2023. On arrival to the ER he was febrile with a temperature of 103.2, tachycardic with a heart rate of 126, and tachypneic with a respiratory rate of 30 and his initial labwork was remarkable for a WBC count of 13.49, lactic acid of 2.3, ESR of 28, CRP of 8.3, hemoglobin of 5.1, hematocrit of 17.7, iron of 221, sodium of 130, glucose of 188, magnesium of 1.7, and BNP of 4820.  His CXR showed a borderline enlarged heart with vascular congestion and mild interstitial edema and x-ray of the right foot revealed diffuse soft tissue swelling and absence of visualization of the 4th and 5th metatarsals as well as the talus with only a small residual of the posterior aspect of the calcaneus remaining and remnants of the midfoot articulations  associated with caudal displacement of the distal aspect of the fibula and tibia with surrounding air lucencies extending from the inferior aspect of the tibia and fibula to the sole of the mid and hindfoot. CT of the abdomen and pelvis demonstrated enlarged inguinal and pelvic lymph nodes haziness to the subcutaneous and adipose tissue which can be seen with generalized edema. He has received vancomycin 500 mg IV and magnesium sulfate 1 gm IV x 1 in the ER. He is otherwise in stable condition and he has no other complaints today.     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  Surgery consulted and plans for debridement today  08/08/2024  wound on RLE, anemia hgb was 6, now 8. [facility] consulted and does not want to risk stenting due to need for blood thinners which he most likley will not tolerate until source of bleeding has been determined.    08/09/2024 CTA shows significant osteomyelitis right LE, significant blockages, also LAD int he pelvic and iliac areas.  Source of anemia discussed with surgery and pt will have prep and colonoscopy planned for am.  Doubt candidate for vascular intervention given extent of osteomyelitis will likely need AKA so risk/isabelle of vascular intervention probably too high.    Interval History:      Review of Systems   Constitutional:  Negative for appetite change, fatigue and fever.   Respiratory:  Negative for cough, shortness of breath and wheezing.    Cardiovascular:  Negative for chest pain and leg swelling.   Gastrointestinal:  Negative for abdominal distention, abdominal pain, constipation, diarrhea, nausea and vomiting.   Skin:  Negative for color change, pallor, rash and wound.   Neurological:  Negative for tremors, syncope and headaches.   Psychiatric/Behavioral:  Negative for agitation and behavioral problems.      Objective:     Vital Signs (Most Recent):  Temp: 98 °F (36.7  °C) (08/09/24 1039)  Pulse: 89 (08/09/24 1039)  Resp: 20 (08/09/24 1039)  BP: 137/75 (08/09/24 1039)  SpO2: 98 % (08/09/24 1300) Vital Signs (24h Range):  Temp:  [97.2 °F (36.2 °C)-98.8 °F (37.1 °C)] 98 °F (36.7 °C)  Pulse:  [] 89  Resp:  [18-20] 20  SpO2:  [94 %-100 %] 98 %  BP: (131-170)/(73-96) 137/75     Weight: 86.9 kg (191 lb 8 oz)  Body mass index is 27.48 kg/m².    Intake/Output Summary (Last 24 hours) at 8/9/2024 1358  Last data filed at 8/9/2024 1306  Gross per 24 hour   Intake 2620 ml   Output 3680 ml   Net -1060 ml         Physical Exam  Vitals and nursing note reviewed. Exam conducted with a chaperone present.   Constitutional:       General: He is not in acute distress.     Appearance: Normal appearance. He is normal weight. He is not ill-appearing.   Cardiovascular:      Rate and Rhythm: Normal rate and regular rhythm.      Pulses: Normal pulses.      Heart sounds: Normal heart sounds.   Pulmonary:      Effort: Pulmonary effort is normal.      Breath sounds: Normal breath sounds.   Abdominal:      General: Abdomen is flat.      Palpations: Abdomen is soft.   Musculoskeletal:      Right lower leg: Edema (right) present.      Comments: Aka left   Skin:     General: Skin is warm and dry.      Findings: Lesion present. No erythema or rash.      Comments: Wound RLE see nurses' notes and photos   Neurological:      General: No focal deficit present.      Mental Status: He is alert and oriented to person, place, and time. Mental status is at baseline.   Psychiatric:         Mood and Affect: Mood normal.         Behavior: Behavior normal.             Significant Labs: All pertinent labs within the past 24 hours have been reviewed.  CBC:   Recent Labs   Lab 08/08/24  0500 08/09/24  0447   WBC 10.71 9.23   HGB 8.0* 8.1*   HCT 24.4* 26.4*   * 575*     CMP:   Recent Labs   Lab 08/08/24  0516 08/09/24  0447   * 134*   K 4.1 4.3    109   CO2 21 23   BUN 14 16   CREATININE 0.94 0.86    CALCIUM 8.2* 8.3*   ALBUMIN 1.9* 1.9*   BILITOT 0.5 0.4   ALKPHOS 195* 182*   AST 16* 16*   ALT 10 8       Significant Imaging: I have reviewed all pertinent imaging results/findings within the past 24 hours.    Assessment/Plan:      * Diabetic ulcer of right heel  An MRI of the right foot shows likley osteomyelitis  Consult general surgery for debridement.  Continue iv abx    Sepsis  Continue vancomycin 15 mg/kg IV every 12 hours, zosyn 3.375 gm IV every 6 hours, and IV fluids. 2 sets of blood cultures have been obtained in the ER.      PAD (peripheral artery disease)  US arterial abnormal to RLE  JAMAL 1.2  Samson needs revascularization will have to wait until source of bleeding has been determined per [facility]      Acute blood loss anemia  S/p 4 units packed RBCs   Repeat CBC in the morning.   Iron panel from 8/6/2024 was consistent with an iron deficiency anemia.  Give venofer  Dr. Duckworth will do colonoscopy in am      Chronic multifocal osteomyelitis of right foot  Acute on chronic  Will need iv abx x 6 weeks      Generalized weakness  Consult PT.    Type II diabetes mellitus  Continue SSI. Hemoglobin A1c    Lab Results   Component Value Date    HGBA1C <4.0 08/06/2024         Hypomagnesemia  improved    Hyponatremia  Continue IV fluids.     Lactic acidosis  Resolved.      Cellulitis of right foot  Continue vancomycin 15 mg/kg IV every 12 hours and zosyn 3.375 gm IV every 6 hours.      VTE Risk Mitigation (From admission, onward)           Ordered     enoxaparin injection 40 mg  Daily         08/06/24 1954     IP VTE HIGH RISK PATIENT  Once         08/06/24 1954                    Discharge Planning   MIRYAM: 8/12/2024     Code Status: Full Code   Is the patient medically ready for discharge?:     Reason for patient still in hospital (select all that apply): Patient trending condition and Treatment  Discharge Plan A: Long-term acute care facility (LTAC)   Discharge Delays: None known at this  time              Marcella Reyes MD  Department of Hospital Medicine   [facility]

## 2024-08-09 NOTE — PRE ADMISSION SCREENING
[facility]  Pre-Admission Patient Screening    Reason of Admission:    DIABETIC ULCER RIGHT HEEL S/P DEBRIDEMENT 8/7  CHRONIC MULTIFOCAL OSTEOMYELITIS RIGHT FOOT REQUIRING 6 WEEKS OF IV VANCOMYCIN Q12 UNTIL 9/17/24 AND IV ZOSYN Q8 UNTIL 9/17/24. POTENTIAL FOR RIGHT BKA.  SEPSIS.   PERIPHERAL ARTERY DISEASE.   ACUTE BLOOD LOSS ANEMIA  TYPE 2 DM REQUIRING SSI.  CELLULITIS OF RIGHT FOOT.   GENERALIZED WEAKNESS REQUIRING PHYSICAL THERAPY   PATIENT REQUIRES LONG TERM ACUTE CARE FOR THE ABOVE MENTIONED REASONS AND CANNOT BE APPROPRIATELY TREATED AT A LOWER LEVEL OF CARE.       LTACH Admission Criteria:    Complex wound care for infected/necrotic skin conditions, Stage III or IV pressure injury, surgical wounds, or burns requiring at least one of the following:  Wound debridement    Must meet at least one of the following concomitant treatments/intervention daily unless notes: (excludes PO meds unless notes)  IV medication per therapeutic regimen, Cardiac Monitoring, Insulin adjustment (daily), IV fludis greater than 50 cc/hr, Laboratory assessment and medication adjustment(s), and Rehab Therapy (PT/OT/ST) 1-3 hours a day greater than or equal to 5 days a week      LTACH more appropriate than other levels of care (eg, skilled nursing facility, home health care), as indicated by:    Clinical management of patient deemed too frequent and needed beyond the capabilities of alternative levels of care as evidence by: Blood glucose monitoring greater than or equal to 4 times daily requiring clinical intervention and Frequency of IV medications greater than or equal to 2 times daily  Frequent diagnostic services needed on an inpatient basis, including clinical assessments, laboratory, and imaging as evidence by: Frequent monitoring and clinical assessments performed by a licensed RN to identify current and future patient needs by incorporating the recognition of normal vs abnormal body physiology, and to  prompt recognition of pertinent changes to identify and prioritize appropriate interventions that can be performed within the acute inpatient setting.  and Frequent laboratory testing and/or imaging to aide in the improvement and effectiveness of patient's individualized treatment plan.   More intensive services, such as speciality nursing care, and/or onsite physician assessments needed that are not available at a lower level of care as evidence by: Daily physician intervention , Collaboration between consulting and attending providers still deemed a necessity to aide in the improvement and effectiveness of patient's individualized treatment plan, which can be [facility]. , and Therapy Services to be included in patient's treatment plan in an effort to restore/improve patient's modality status to a safe level of functioning prior to acute illness.      [facility], as indicated by ALL of the following:      Hypotension Absent     Cardiovascular status stable     Stable chest findings     Renal function accepctable   Pain adequately managed    Intake acceptable       No acute significant hepatic dysfunction (eg, new encephalopathy)   No acute severe unstable neurologic abnormalities (eg, Altered mental status that is severe or persistent, or evidence of ongoing CNS embolization or ischemia, worsening hydrocephalus)   No active bleeding or unstable disorders of hemostasis (eg, no recent need for transfusion, severe thrombocytopenia with bleeding)   No need for respiratory or other isolation, OR manageable at LTACH level of care    Long-term enteral feeding (eg, PEG) and intravenous access established, not needed, OR to be placed at LTACH level of care      Anticipated Discharge Disposition:    Home with Home Health      Referring Physician  DR. ISHMAEL MACK    Facility Status: Accept     Admitting Physician:  Tahir Ha MD    Primary Care Physician:  John  Winston LAGUERRE MD    History         Patient Active Problem List    Diagnosis Date Noted    PAD (peripheral artery disease) 08/08/2024    Chronic multifocal osteomyelitis of right foot 08/08/2024    Cellulitis of right foot 08/06/2024    Sepsis 08/06/2024    Lactic acidosis 08/06/2024    Hyponatremia 08/06/2024    Hypomagnesemia 08/06/2024    Type II diabetes mellitus 08/06/2024    Generalized weakness 08/06/2024    Acute blood loss anemia 05/20/2023    Osteomyelitis 05/18/2023    Gangrene 05/17/2023    Burn of right foot 05/16/2023    Diabetic ulcer of right heel 05/16/2023    Non-pressure chronic ulcer of right heel and midfoot with fat layer exposed 05/16/2023         Previous Specialties/Consulted physicians:      Cardiology , General Surgery , and Wound Care       Past and Current Medical History    Past Medical History:   Diagnosis Date    Chronic diastolic CHF (congestive heart failure)     Gas gangrene of left lower extremity     History of osteomyelitis of left lower extremity     Hyperlipidemia     Hypertension     Iron deficiency anemia     Type II diabetes mellitus     Wet gangrene of right lower extremity        History of Present Illness     Principal Problem:Diabetic ulcer of right heel     Chief Complaint: Generalized weakness.     HPI: Mr. Robb is a 58 year old  male with a history of type II diabetes mellitus, HTN, chronic diastolic CHF, and hyperlipidemia who presented to the ER today with a 1 day history of generalized weakness. He was in his normal state of health until 2 weeks ago when he noticed right foot swelling due to a right diabetic heel ulcer. He has had bleeding and drainage from his right foot, fevers, and chills but he denies any chest pain, shortness of breath, nausea, vomiting, or abdominal pain. He developed generalized weakness yesterday which worsened today and he presented to [facility] for further evaluation. Of note, he has a history of  chronic right heel ulcer with necrotic wound s/p excisional debridement on 5/17/2023 and transmetatarsal amputation of the 4th and 5th digits on 5/24/2023. On arrival to the ER he was febrile with a temperature of 103.2, tachycardic with a heart rate of 126, and tachypneic with a respiratory rate of 30 and his initial labwork was remarkable for a WBC count of 13.49, lactic acid of 2.3, ESR of 28, CRP of 8.3, hemoglobin of 5.1, hematocrit of 17.7, iron of 221, sodium of 130, glucose of 188, magnesium of 1.7, and BNP of 4820.  His CXR showed a borderline enlarged heart with vascular congestion and mild interstitial edema and x-ray of the right foot revealed diffuse soft tissue swelling and absence of visualization of the 4th and 5th metatarsals as well as the talus with only a small residual of the posterior aspect of the calcaneus remaining and remnants of the midfoot articulations associated with caudal displacement of the distal aspect of the fibula and tibia with surrounding air lucencies extending from the inferior aspect of the tibia and fibula to the sole of the mid and hindfoot. CT of the abdomen and pelvis demonstrated enlarged inguinal and pelvic lymph nodes haziness to the subcutaneous and adipose tissue which can be seen with generalized edema. He has received vancomycin 500 mg IV and magnesium sulfate 1 gm IV x 1 in the ER. He is otherwise in stable condition and he has no other complaints today.      Assessment/Plan:      * Diabetic ulcer of right heel  An MRI of the right foot will be obtained to rule out osteomyelitis. Consult general surgery for debridement.     Sepsis  Continue vancomycin 15 mg/kg IV every 12 hours, zosyn 3.375 gm IV every 6 hours, and IV fluids. 2 sets of blood cultures have been obtained in the ER.       Cellulitis of right foot  Continue vancomycin 15 mg/kg IV every 12 hours and zosyn 3.375 gm IV every 6 hours.     Acute blood loss anemia  Transfuse 2 units packed RBCs now. Repeat  CBC in the morning. Iron panel from 8/6/2024 was consistent with an iron deficiency anemia.     Generalized weakness  Consult PT.     Lactic acidosis  Resolved. Continue IV fluids.     Hypomagnesemia  She has received magnesium sulfate 1 gm IV x 1 in the ER. Repeat magnesium in the morning.     Hyponatremia  Continue IV fluids.      Type II diabetes mellitus  Continue SSI. Hemoglobin A1c pending.    Patient Traveled outside of the [place] in the last 3 months? no     Patient discharged from this LTAC to SNF within the last 45 days? no    Patient discharged from this LTAC to Rehab within the last 27 days? no    Prior residence: home    Prior Post-Acute Services: N/A     Allergies:   Review of patient's allergies indicates:   Allergen Reactions    Dilaudid [hydromorphone]     Iodinated contrast media        Has patient received the current influenza vaccine (Oct 1 - March 31)? Unknown     Has patient received PPD skin test prior to admit? No    Code Status: Full Code    Orientation: Time, Place, Person, and Events    Speech: normal     Vital Signs:         Bowel/Bladder: continent of bladder and continent of bowel    Dialysis: N/A         Peripheral IV - Single Lumen 08/06/24 1406 18 G Left Forearm (Active)   Site Assessment Clean;Dry;Intact 08/09/24 0400   Extremity Assessment Distal to IV Dry;Warm;No abnormal discoloration 08/07/24 2000   Line Status Infusing 08/09/24 0400   Dressing Status Clean;Dry;Intact 08/09/24 0400   Dressing Intervention Integrity maintained 08/09/24 0400   Number of days: 2            Peripheral IV - Single Lumen 08/06/24 1411 20 G Left Antecubital (Active)   Site Assessment Clean;Dry;Intact 08/09/24 0400   Extremity Assessment Distal to IV Dry;Warm;No abnormal discoloration 08/07/24 2000   Line Status Saline locked;Flushed 08/09/24 0400   Dressing Status Clean;Dry;Intact 08/09/24 0400   Dressing Intervention Integrity maintained 08/09/24 0400   Number of days: 2       CBGs/Accuchecks: Yes      Precautions: Fall    Restraints: No     Isolation Precautions: N/A       Facility-Administered Medications as of 8/9/2024   Medication Dose Route Frequency Provider Last Rate Last Admin    0.9%  NaCl infusion (for blood administration)   Intravenous Q24H PRN Tahir Ha MD        0.9%  NaCl infusion (for blood administration)   Intravenous Q24H PRN Marcella Reyes MD        0.9%  NaCl infusion   Intravenous Continuous Tahir Ha  mL/hr at 08/09/24 0723 New Bag at 08/09/24 0723    [COMPLETED] acetaminophen tablet 650 mg  650 mg Oral ED 1 Time Eliot Rivera DO   650 mg at 08/06/24 1409    acetaminophen tablet 650 mg  650 mg Oral Q8H PRN Tahir Ha MD        aluminum-magnesium hydroxide-simethicone 200-200-20 mg/5 mL suspension 30 mL  30 mL Oral QID PRN Tahir Ha MD        bisacodyL suppository 10 mg  10 mg Rectal Daily PRN Tahir Ha MD        dextrose 10% bolus 125 mL 125 mL  12.5 g Intravenous PRN Tahir Ha MD        dextrose 10% bolus 250 mL 250 mL  25 g Intravenous PRN Tahir Ha MD        diphenhydrAMINE capsule 50 mg  50 mg Oral On Call Procedure Marcella Reyes MD        diphenhydrAMINE injection 50 mg  50 mg Intravenous On Call Procedure Marcella Reyes MD   50 mg at 08/09/24 0830    enoxaparin injection 40 mg  40 mg Subcutaneous Daily Tahir Ha MD   40 mg at 08/06/24 2041    famotidine tablet 20 mg  20 mg Oral On Call Procedure Max Duckworth MD        glucagon (human recombinant) injection 1 mg  1 mg Intramuscular PRN Tahir Ha MD        glucose chewable tablet 16 g  16 g Oral PRN Tahir Ha MD        glucose chewable tablet 24 g  24 g Oral PRN Tahir Ha MD        glycopyrrolate injection 0.2 mg  0.2 mg Intravenous On Call Procedure Max Duckworth MD        insulin aspart U-100 injection 0-5 Units  0-5 Units Subcutaneous QID (AC + HS) PRN Tahir Ha MD        [COMPLETED]  iohexoL (OMNIPAQUE 350) injection 100 mL  100 mL Intravenous ONCE PRN Tahir Ha MD   150 mL at 08/09/24 0854    ketorolac injection 15 mg  15 mg Intravenous Q6H PRN Tahir Ha MD        [COMPLETED] magnesium sulfate 2g in water 50mL IVPB (premix)  2 g Intravenous Once Marcella Reyes MD   Stopped at 08/08/24 1117    [COMPLETED] magnesium sulfate in dextrose IVPB (premix) 1 g  1 g Intravenous Once Tahir Ha MD   Stopped at 08/07/24 0010    melatonin tablet 6 mg  6 mg Oral Nightly PRN Tahir Ha MD        midazolam (VERSED) 1 mg/mL injection 2 mg  2 mg Intravenous On Call Procedure Max Duckworth MD        naloxone 0.4 mg/mL injection 0.02 mg  0.02 mg Intravenous PRN Tahir Ha MD        [COMPLETED] ondansetron injection 4 mg  4 mg Intravenous ED 1 Time Eliot Rivera DO   4 mg at 08/06/24 1517    ondansetron injection 4 mg  4 mg Intravenous Q8H PRN Tahir Ha MD        [COMPLETED] pantoprazole injection 40 mg  40 mg Intravenous ED 1 Time Eliot Rivera DO   40 mg at 08/06/24 1516    [COMPLETED] piperacillin-tazobactam (ZOSYN) 4.5 g in D5W 100 mL IVPB (MB+)  4.5 g Intravenous ED 1 Time Eliot Rivera DO   Stopped at 08/06/24 1450    piperacillin-tazobactam (ZOSYN) 4.5 g in D5W 100 mL IVPB (MB+)  4.5 g Intravenous Q8H Tahir Ha MD 25 mL/hr at 08/09/24 0637 4.5 g at 08/09/24 0637    predniSONE tablet 50 mg  50 mg Oral On Call Procedure Marcella Reyes MD   50 mg at 08/09/24 0830    promethazine tablet 25 mg  25 mg Oral Q6H PRN Tahir Ha MD        [COMPLETED] sodium chloride 0.9% bolus 1,000 mL 1,000 mL  1,000 mL Intravenous ED 1 Time Eliot Rivera DO   Stopped at 08/06/24 1515    sodium chloride 0.9% flush 10 mL  10 mL Intravenous Q12H PRN Tahir Ha MD        [COMPLETED] vancomycin (VANCOCIN) 1,000 mg in D5W 250 mL IVPB (Vial-Mate)  1,000 mg Intravenous ED 1 Time Eliot Rivera DO   Stopped at  "08/06/24 1635    vancomycin (VANCOCIN) 1,750 mg in D5W 500 mL IVPB  1,750 mg Intravenous Q12H Tahir Ha MD   Stopped at 08/09/24 0046    [COMPLETED] vancomycin (VANCOCIN) 500 mg in D5W 100 mL IVPB (MB+)  500 mg Intravenous ED 1 Time Tahir Ha MD   Stopped at 08/06/24 2141    vancomycin - pharmacy to dose   Intravenous pharmacy to manage frequency Tahir Ha MD         No current outpatient medications on file as of 8/9/2024.        Cardiovascular:    Cardiovascular Review: Requires Review    Telemetry: Yes    Rhythm:  SINUS TACHYCARDIA  / ATRIAL FIBRILLATION    AICD: No      Respiratory:    Oxygen: N/A    CPT/Frequency: N/A    Incentive Spirometer/Frequency: N/A    CPAP/Settings: N/A    BiPAP/Settings: N/A    Oxygen Saturation:  100% RA    Suction Frequency: N/A    Nutrition:      Ht Readings from Last 3 Encounters:   08/06/24 5' 10" (1.778 m)   05/26/23 5' 10.98" (1.803 m)   05/16/23 5' 10" (1.778 m)     Wt Readings from Last 1 Encounters:   08/07/24 1901 86.9 kg (191 lb 8 oz)   08/06/24 2150 84.5 kg (186 lb 4.8 oz)   08/06/24 1358 74.8 kg (165 lb)       Feeding Status:   Current Diet: DIABETIC DOUBLE PROTEIN 2000 CALORIE   Supplements:N/A   Tube Feeding: N/A   Flushes: N/A      Integumentary:    Integumentary: Surgical Incisions and Other: SEE WOUND CARE NOTES BELOW              Wound 08/06/24 2150 Other (comment) Right plantar;distal Heel #1 (Active)   08/06/24 2150   Present on Original Admission: Y   Primary Wound Type: Other   Side: Right   Orientation: plantar;distal   Location: Heel   Wound Approximate Age at First Assessment (Weeks):    Wound Number: #1   Is this injury device related?: No   Incision Type:    Closure Method:    Wound Description (Comments):    Type:    Additional Comments:    Ankle-Brachial Index:    Pulses:    Removal Indication and Assessment:    Wound Outcome:    Wound Image    08/08/24 1150   Dressing Appearance Dry;Intact;Clean 08/08/24 2200   Drainage Amount " None 08/08/24 2200   Drainage Characteristics/Odor Creamy;Serosanguineous;Cuevas;Malodorous 08/08/24 1150   Appearance Dressing in place, unable to visualize 08/08/24 2200   Tissue loss description Full thickness 08/06/24 2150   Periwound Area Edematous 08/08/24 1150   Wound Edges Irregular 08/08/24 1150   Wound Length (cm) 14 cm 08/08/24 1150   Wound Width (cm) 13 cm 08/08/24 1150   Wound Depth (cm) 3 cm 08/08/24 1150   Wound Volume (cm^3) 546 cm^3 08/08/24 1150   Wound Surface Area (cm^2) 182 cm^2 08/08/24 1150   Care Cleansed with: 08/08/24 1150   Dressing Applied 08/08/24 1150   Dressing Change Due 08/09/24 08/08/24 1150   Number of days: 3            Wound 08/06/24 2150 Other (comment) Right lateral Calf #2 (Active)   08/06/24 2150   Present on Original Admission: Y   Primary Wound Type: Other   Side: Right   Orientation: lateral   Location: Calf   Wound Approximate Age at First Assessment (Weeks):    Wound Number: #2   Is this injury device related?: No   Incision Type:    Closure Method:    Wound Description (Comments):    Type:    Additional Comments:    Ankle-Brachial Index:    Pulses:    Removal Indication and Assessment:    Wound Outcome:    Wound Image   08/08/24 1150   Dressing Appearance Dry;Intact;Clean 08/08/24 2200   Drainage Amount None 08/08/24 2200   Drainage Characteristics/Odor Serosanguineous;Malodorous 08/08/24 1150   Appearance Dressing in place, unable to visualize 08/08/24 2200   Tissue loss description Full thickness 08/08/24 1150   Periwound Area Edematous 08/08/24 1150   Wound Edges Defined 08/08/24 1150   Wound Length (cm) 2.7 cm 08/08/24 1150   Wound Width (cm) 1.5 cm 08/08/24 1150   Wound Depth (cm) 0.1 cm 08/08/24 1150   Wound Volume (cm^3) 0.405 cm^3 08/08/24 1150   Wound Surface Area (cm^2) 4.05 cm^2 08/08/24 1150   Care Cleansed with: 08/08/24 1150   Dressing Applied 08/08/24 1150   Dressing Change Due 08/09/24 08/08/24 1150   Number of days: 3            Incision/Site 05/24/23 6553  Right Foot (Active)   05/24/23 1743   Present Prior to Hospital Arrival?:    Side: Right   Location: Foot   Orientation:    Incision Type:    Closure Method:    Additional Comments:    Removal Indication and Assessment:    Wound Outcome:    Removal Indications:    Number of days: 443         Musculoskeletal:    Transfer assist: Minimal Assistance    Weight Bearing Status: N/A    Comments: N/A    ADL Assist: Minimal Assistance    Special Equipment: wheelchair    Radiology:    Radiology (Last 168 hours)               08/09 0715 Cardiac monitoring strips     08/09 0714 Cardiac monitoring strips     08/09 0414 Cardiac monitoring strips     08/08 2336 Cardiac monitoring strips     08/08 1931 Cardiac monitoring strips     08/08 1424 Cardiac monitoring strips     08/08 1025 Cardiac monitoring strips     08/08 1025 Cardiac monitoring strips     08/08 0632 Cardiac monitoring strips     08/08 0348 Cardiac monitoring strips     08/08 0037 Cardiac monitoring strips     08/07 2054 Cardiac monitoring strips     08/07 1751 US Lower Extrem Arteries Right with JAMAL (xpd)       08/07 1550 Cardiac monitoring strips     08/07 1103 Cardiac monitoring strips     08/07 0750 MRI Foot Toes WO Contrast RT       08/07 0333 Cardiac monitoring strips     08/06 2334 Cardiac monitoring strips     08/06 1550 CT Abdomen Pelvis  Without Contrast       08/06 1549 X-Ray Chest 1 View       08/06 1548 X-Ray Foot Complete Right              ..  US Lower Extrem Arteries Right with JAMAL (xpd)  Narrative: EXAMINATION:  US ARTERIAL LOWER EXTREMITY RIGHT WITH JAMAL (XPD)    CLINICAL HISTORY:  wound;    COMPARISON:  None    FINDINGS:  Waveforms and peak systollic velocities are as follows:    RIGHT LOWER EXTREMITY:    Common femoral artery:  Biphasic 142cm/s    Profunda: Biphasic 68cm/s    Proximal superficial femoral artery: Biphasic 86cm/s    Mid superficial femoral artery: Biphasic 85cm/s    Distal superficial femoral artery: Monophasic 120cm/s    Proximal  popliteal: Continuous waveforms with a peak systolic velocity of 118cm/s and end-diastolic flow of 20cm/s    Distal popliteal: Continuous waveforms with a peak systolic velocity of 145cm/s and end-diastolic flow of 25 cm/s    Peroneal: Continuous waveforms with a peak systolic velocity of 141cm/s and an end-diastolic flow of 32cm/s    Posterior tibial: Continuous waveforms with a peak systolic velocity of 166cm/s and an end-diastolic flow of 28 cm/s    Anterior tibial: Continues waveforms with a peak systolic velocity of 50cm/s and an end-diastolic flow of 10 cm/s    Dorsalis pedis: cm/s (not visualized)    Ankle-brachial indices  1.22  Impression: 1. Biphasic flow in the arteries of the right lower extremity from the right common femoral to the distal right superficial femoral artery.  Continuous waveforms from the right popliteal to the right anterior tibial artery.  Ankle brachial index 1.22.    Electronically signed by: Gregor Rodriguez  Date:    08/07/2024  Time:    18:19  MRI Foot Toes WO Contrast RT  Narrative: EXAMINATION:  MRI FOOT TOES WO CONTRAST RT    CLINICAL HISTORY:  Osteomyelitis, foot;    TECHNIQUE:  MRI of the right forefoot performed without contrast using routine protocol.    COMPARISON:  Radiograph 08/06/2024    FINDINGS:  Markedly advanced changes related to neuropathic joint including extensive bone destruction of the hindfoot and midfoot bones, disarticulation of the hindfoot with essentially complete destruction of the talus and anterior calcaneus.  There is a large ulcer in the calcaneal region which appears to approach the inferior cortical margin of the distal tibia throughout the distal.  There is suspected severe edema tibial diaphysis and metaphyseal region consistent with osteomyelitis.  Similar signal changes noted involving the distal fibula.  Sclerosis and edema also noted involving residual tarsal bone fragments including the navicular, cuneiform bones more significant laterally,  "proximal 3rd metatarsal.  4th metatarsa normal signal.  L. Distal phalanges appear to demonstrate    Extensive severe generalized soft tissue edema.  Impression: Large ulcer in the heel region likely extending to the cortical margin of the inferior tibia and fibula with severe edema involving the distal tibia and fibula and suspected marginal erosive changes consistent with osteomyelitis.    Partial destruction of the midfoot bones secondary to chronic infection versus neuropathic joint.  Similar appearing edema throughout the residual tarsal bone fragments including the navicular, cuneiform bones, and proximal 3rd metatarsal also suspicious for osteomyelitis.    Electronically signed by: Kianna Becker MD  Date:    08/07/2024  Time:    09:13      Lab/Cultures:          Blood Urea Nitrogen   Date Value Ref Range Status   08/09/2024 16 7.0 - 20.0 mg/dL Final   08/08/2024 14 7.0 - 20.0 mg/dL Final     Creatinine   Date Value Ref Range Status   08/09/2024 0.86 0.66 - 1.25 mg/dL Final   08/08/2024 0.94 0.66 - 1.25 mg/dL Final     WBC   Date Value Ref Range Status   08/09/2024 9.23 4.00 - 11.50 x10(3)/mcL Final   08/08/2024 10.71 4.00 - 11.50 x10(3)/mcL Final      Blood Culture   Date Value Ref Range Status   08/06/2024 No Growth At 48 Hours  Preliminary     Wound Culture   Date Value Ref Range Status   08/06/2024 Moderate PRESUMPTIVE PROTEUS SPECIES (A)  Final   08/06/2024 Moderate Beta hemolytic streptococci (A)  Final     No results for input(s): "PH", "PCO2", "PO2", "HCO3", "POCSATURATED", "BE" in the last 72 hours.        "

## 2024-08-09 NOTE — PROGRESS NOTES
Pharmacokinetic Assessment Follow Up: IV Vancomycin    Vancomycin serum concentration assessment(s):    The trough level was drawn correctly and can be used to guide therapy at this time. The measurement is within the desired definitive target range of 15 to 20 mcg/mL.    Vancomycin Regimen Plan:    Continue regimen to Vancomycin 1750 mg IV every 12 hours with next serum trough concentration measured at 1030 prior to 3rd dose on 08/10/2024    Drug levels (last 3 results):  Recent Labs   Lab Result Units 08/07/24  0906 08/08/24  0931 08/09/24  1025   Vancomycin Random ug/ml 6.9  --   --    Vancomycin Trough ug/ml  --  10.9 15.7       Pharmacy will continue to follow and monitor vancomycin.    Please contact pharmacy at [email or web address] [phone] for questions regarding this assessment.    Thank you for the consult,   Savannah Lyle, [facility].  [phone]       Patient brief summary:  Honorio Robb is a 58 y.o. male initiated on antimicrobial therapy with IV Vancomycin for treatment of bone/joint infection      Drug Allergies:   Review of patient's allergies indicates:   Allergen Reactions    Dilaudid [hydromorphone]     Iodinated contrast media        Renal Function:   Estimated Creatinine Clearance: 99 mL/min (based on SCr of 0.86 mg/dL).,       CBC (last 72 hours):  Recent Labs   Lab Result Units 08/06/24  1355 08/07/24  0734 08/08/24  0500 08/09/24  0447   WBC x10(3)/mcL 13.49* 12.21* 10.71 9.23   Hgb g/dL 5.1* 6.4* 8.0* 8.1*   Hemoglobin A1c % <4.0  --   --   --    Hct % 17.7* 20.6* 24.4* 26.4*   Platelet x10(3)/mcL 686* 474* 508* 575*   Mono % % 6.8 7.5 9.4 8.1   Eos % % 0.3* 1.4 2.1 2.9   Basophil % % 0.1 0.4 0.5 0.1       Metabolic Panel (last 72 hours):  Recent Labs   Lab Result Units 08/06/24  1355 08/06/24  1731 08/07/24  0734 08/08/24  0516 08/09/24  0447   Sodium mmol/L 130*  --  135* 130* 134*   Potassium mmol/L 4.5  --  4.2 4.1 4.3   Chloride mmol/L 101  --  107 106 109   CO2 mmol/L 20*  --  24 21 23   Glucose mg/dL  188*  --  109 179* 114   Glucose, UA   --  Negative  --   --   --    Blood Urea Nitrogen mg/dL 20  --  16 14 16   Creatinine mg/dL 1.09  --  0.93 0.94 0.86   Albumin g/dL 2.8*  --  2.1* 1.9* 1.9*   Bilirubin Total mg/dL 0.5  --  0.6 0.5 0.4   ALP unit/L 264*  --  223* 195* 182*   AST unit/L 29  --  15* 16* 16*   ALT unit/L 19  --  11 10 8   Magnesium Level mg/dL 1.70*  --  2.00 1.70*  --        Microbiologic Results:  Microbiology Results (last 7 days)       Procedure Component Value Units Date/Time    Wound Culture [[id number]]  (Abnormal)  (Susceptibility) Collected: 08/06/24 1407    Order Status: Completed Specimen: Drainage from Foot, Right Updated: 08/09/24 1103     Wound Culture Moderate Proteus mirabilis      Moderate Beta hemolytic streptococci    Narrative:      Due to overgrowth of Proteus identification and susceptibility cannot be performed on Beta streptococcus.    Blood Culture #1 **CANNOT BE ORDERED STAT** [[id number]] Collected: 08/06/24 1355    Order Status: Completed Specimen: Blood from Arm, Left Updated: 08/08/24 1502     Blood Culture No Growth At 48 Hours    Blood Culture #2 **CANNOT BE ORDERED STAT** [[id number]] Collected: 08/06/24 1418    Order Status: Completed Specimen: Blood from Antecubital, Right Updated: 08/08/24 1502     Blood Culture No Growth At 48 Hours

## 2024-08-09 NOTE — PROGRESS NOTES
[facility]-Med/Surg  Wound Care    Patient Name:  Honorio Robb   MRN:  [id number]  Date: 8/9/2024  Diagnosis: Diabetic ulcer of right heel    History:     Past Medical History:   Diagnosis Date    Chronic diastolic CHF (congestive heart failure)     Gas gangrene of left lower extremity     History of osteomyelitis of left lower extremity     Hyperlipidemia     Hypertension     Iron deficiency anemia     Type II diabetes mellitus     Wet gangrene of right lower extremity        Social History     Socioeconomic History    Marital status: Single   Tobacco Use    Smoking status: Never     Passive exposure: Never    Smokeless tobacco: Never   Substance and Sexual Activity    Alcohol use: Never    Drug use: Never     Social Determinants of Health     Financial Resource Strain: High Risk (8/7/2024)    Overall Financial Resource Strain (CARDIA)     Difficulty of Paying Living Expenses: Very hard   Food Insecurity: Food Insecurity Present (8/7/2024)    Hunger Vital Sign     Worried About Running Out of Food in the Last Year: Often true     Ran Out of Food in the Last Year: Often true   Transportation Needs: No Transportation Needs (8/7/2024)    TRANSPORTATION NEEDS     Transportation : No   Physical Activity: Sufficiently Active (8/7/2024)    Exercise Vital Sign     Days of Exercise per Week: 7 days     Minutes of Exercise per Session: 30 min   Stress: Stress Concern Present (8/7/2024)    [employer] - Occupational Stress Questionnaire     Feeling of Stress : To some extent   Housing Stability: High Risk (8/7/2024)    Housing Stability Vital Sign     Unable to Pay for Housing in the Last Year: Yes     Homeless in the Last Year: Yes       Precautions:     Allergies as of 08/06/2024 - Reviewed 08/06/2024   Allergen Reaction Noted    Dilaudid [hydromorphone]  05/17/2023    Iodinated contrast media  05/17/2023       WOC Assessment Details/Treatment        08/09/24 1255   WOCN  Assessment   WOCN Total Time (mins) 30   Visit Date 08/09/24   Visit Time 1230   Consult Type Follow Up   WOCN Speciality Wound   Wound other   Intervention changed   Teaching on-going        Wound 08/06/24 2150 Other (comment) Right plantar;distal Heel #1   Date First Assessed/Time First Assessed: 08/06/24 2150   Present on Original Admission: Yes  Primary Wound Type: Other (comment)  Side: Right  Orientation: plantar;distal  Location: (c) Heel  Wound Number: #1  Is this injury device related?: No   Dressing Appearance Moist drainage   Drainage Amount Copious   Drainage Characteristics/Odor Serosanguineous;Creamy;Malodorous   Appearance Red;Moist;Smooth   Tissue loss description Full thickness   Periwound Area Edematous   Wound Edges Irregular   Care Cleansed with:  (vashe)   Dressing Applied;Gauze, wet to dry  (vashe moist then covered with gauze, abd pads, kerlix and ace.)        Wound 08/06/24 2150 Other (comment) Right lateral Calf #2   Date First Assessed/Time First Assessed: 08/06/24 2150   Present on Original Admission: Yes  Primary Wound Type: Other (comment)  Side: Right  Orientation: lateral  Location: Calf  Wound Number: #2  Is this injury device related?: No   Dressing Appearance Clean;Intact;Dry   Drainage Amount Scant   Drainage Characteristics/Odor Serosanguineous;No odor   Appearance Red;Yellow;Moist   Tissue loss description Full thickness   Periwound Area Edematous   Care Cleansed with:  (vashe)   Dressing Applied;Gauze, wet to dry  (vashe moist then covered with gauze, kerlix and ace wrap.)   Dressing Change Due 08/10/24      Orders placed.  Will change frequency of foot dressing change to BID and prn.  Right lateral calf dressing will remain daily.      08/09/2024

## 2024-08-09 NOTE — ASSESSMENT & PLAN NOTE
S/p 4 units packed RBCs   Repeat CBC in the morning.   Iron panel from 8/6/2024 was consistent with an iron deficiency anemia.  Give zen [place] will do colonoscopy in am

## 2024-08-09 NOTE — PROGRESS NOTES
Inpatient Nutrition Education    Admit Date: 8/6/2024   Total duration of encounter: 3 days     Nutrition Education:    Patient educated on: Diabetic Diet.    Previously Education: No.  Teaching Method:  Explanation and Printed Materials  Patient's Understanding: Verbalizes understanding  Barriers to learning: None evident  Expected Compliance:  fair  Labs of Note:  Recent Labs   Lab 08/06/24  1355 08/07/24  0734 08/08/24  0516 08/09/24  0447   * 135* 130* 134*   BUN 20 16 14 16   CREATININE 1.09 0.93 0.94 0.86   EGFRNORACEVR 80 >90 >90 >90   GLUCOSE 188* 109 179* 114   HGBA1C <4.0  --   --   --        All questions were answered and dietitian's contact information was provided.     Thank you for the consult.

## 2024-08-09 NOTE — PT/OT/SLP PROGRESS
Physical Therapy Treatment    Patient Name:  Honorio Robb   MRN:  [id number]    Recommendations:     Discharge Recommendations:  (Continue to assess)  Discharge Equipment Recommendations: none  Barriers to discharge:  current medical status    Assessment:     Honorio Robb is a 58 y.o. male admitted with a medical diagnosis of Diabetic ulcer of right heel.  He presents with the following impairments/functional limitations: weakness, impaired functional mobility, impaired balance     Pt found with HOB elevated. He is agreeable to PT tx this afternoon. Therapist provided diabetes education to pt: proper diet, limb care. Pt completed supine to sit, min A x2. Pt tolerated sitting EOB for approximately 20 minutes, sitting unsupported, SBA. He also performed RLE ankle pumps. Pt left sitting EOB with all needs met. Nurse notified.    Rehab Prognosis: Fair; patient would benefit from acute skilled PT services to address these deficits and reach maximum level of function.    Recent Surgery: Procedure(s) (LRB):  AMPUTATION, BELOW KNEE (Right) Day of Surgery    Plan:     During this hospitalization, patient to be seen 5 x/week (5-6x weekly/1-2x daily) to address the identified rehab impairments via gait training, therapeutic activities, therapeutic exercises and progress toward the following goals:    Plan of Care Expires:  09/06/24    Subjective     Chief Complaint: hungry  Patient/Family Comments/goals: to get better  Pain/Comfort:         Objective:     Communicated with nursing prior to session.  Patient found HOB elevated with   upon PT entry to room.     General Precautions: Standard, fall, contact  Orthopedic Precautions: N/A  Braces: N/A  Respiratory Status: Room air     Functional Mobility:  Bed Mobility:     Supine to Sit: minimum assistance and of 2 persons      AM-PAC 6 CLICK MOBILITY          Treatment & Education:  See above    Patient left sitting edge of bed with all lines intact, call button in reach, bed  alarm on, and nurse notified..    GOALS:   Multidisciplinary Problems       Physical Therapy Goals          Problem: Physical Therapy    Goal Priority Disciplines Outcome Goal Variances Interventions   Physical Therapy Goal     PT, PT/OT Progressing     Description: Goals to be met by: discharge     Patient will increase functional independence with mobility by performin. Supine to sit with Contact Guard Assistance  2. Sit to supine with Contact Guard Assistance  3. Bed to chair transfer with Minimal Assistance using Slideboard                         Time Tracking:     PT Received On: 24  PT Start Time: 1405     PT Stop Time: 1430  PT Total Time (min): 25 min     Billable Minutes: Therapeutic Activity 25    Treatment Type: Treatment  PT/PTA: PT           2024

## 2024-08-09 NOTE — PLAN OF CARE
08/09/24 1309   Discharge Reassessment   Assessment Type Discharge Planning Reassessment   Did the patient's condition or plan change since previous assessment? No   Discharge Plan discussed with: Patient   Communicated MIRYAM with patient/caregiver Date not available/Unable to determine   Discharge Plan A Long-term acute care facility (LTAC)   DME Needed Upon Discharge  none   Transition of Care Barriers None   Why the patient remains in the hospital Requires continued medical care   Post-Acute Status   Discharge Delays None known at this time

## 2024-08-09 NOTE — PLAN OF CARE
Problem: Adult Inpatient Plan of Care  Goal: Plan of Care Review  Outcome: Progressing  Goal: Absence of Hospital-Acquired Illness or Injury  Outcome: Progressing  Goal: Optimal Comfort and Wellbeing  Outcome: Progressing  Goal: Readiness for Transition of Care  Outcome: Progressing     Problem: Diabetes Comorbidity  Goal: Blood Glucose Level Within Targeted Range  Outcome: Progressing     Problem: Sepsis/Septic Shock  Goal: Optimal Coping  Outcome: Progressing  Goal: Absence of Bleeding  Outcome: Progressing  Goal: Blood Glucose Level Within Targeted Range  Outcome: Progressing  Goal: Absence of Infection Signs and Symptoms  Outcome: Progressing  Goal: Optimal Nutrition Intake  Outcome: Progressing     Problem: Wound  Goal: Optimal Coping  Outcome: Progressing  Goal: Optimal Functional Ability  Outcome: Progressing  Goal: Absence of Infection Signs and Symptoms  Outcome: Progressing  Goal: Improved Oral Intake  Outcome: Progressing  Goal: Optimal Pain Control and Function  Outcome: Progressing  Goal: Skin Health and Integrity  Outcome: Progressing  Goal: Optimal Wound Healing  Outcome: Progressing     Problem: Fall Injury Risk  Goal: Absence of Fall and Fall-Related Injury  Outcome: Progressing     Problem: Skin Injury Risk Increased  Goal: Skin Health and Integrity  Outcome: Progressing     Problem: Fatigue  Goal: Improved Activity Tolerance  Outcome: Progressing     Problem: Diabetes  Goal: Optimal Coping  Outcome: Progressing  Goal: Optimal Functional Ability  Outcome: Progressing  Goal: Blood Glucose Level Within Target Range  Outcome: Progressing  Goal: Minimize Risk of Hypoglycemia  Outcome: Progressing     Problem: Anemia  Goal: Anemia Symptom Improvement  Outcome: Progressing     Problem: Heart Failure  Goal: Optimal Coping  Outcome: Progressing  Goal: Optimal Cardiac Output  Outcome: Progressing  Goal: Stable Heart Rate and Rhythm  Outcome: Progressing  Goal: Optimal Functional Ability  Outcome:  Progressing  Goal: Fluid and Electrolyte Balance  Outcome: Progressing  Goal: Improved Oral Intake  Outcome: Progressing  Goal: Effective Oxygenation and Ventilation  Outcome: Progressing  Goal: Effective Breathing Pattern During Sleep  Outcome: Progressing

## 2024-08-09 NOTE — SUBJECTIVE & OBJECTIVE
Interval History:      Review of Systems   Constitutional:  Negative for appetite change, fatigue and fever.   Respiratory:  Negative for cough, shortness of breath and wheezing.    Cardiovascular:  Negative for chest pain and leg swelling.   Gastrointestinal:  Negative for abdominal distention, abdominal pain, constipation, diarrhea, nausea and vomiting.   Skin:  Negative for color change, pallor, rash and wound.   Neurological:  Negative for tremors, syncope and headaches.   Psychiatric/Behavioral:  Negative for agitation and behavioral problems.      Objective:     Vital Signs (Most Recent):  Temp: 98 °F (36.7 °C) (08/09/24 1039)  Pulse: 89 (08/09/24 1039)  Resp: 20 (08/09/24 1039)  BP: 137/75 (08/09/24 1039)  SpO2: 98 % (08/09/24 1300) Vital Signs (24h Range):  Temp:  [97.2 °F (36.2 °C)-98.8 °F (37.1 °C)] 98 °F (36.7 °C)  Pulse:  [] 89  Resp:  [18-20] 20  SpO2:  [94 %-100 %] 98 %  BP: (131-170)/(73-96) 137/75     Weight: 86.9 kg (191 lb 8 oz)  Body mass index is 27.48 kg/m².    Intake/Output Summary (Last 24 hours) at 8/9/2024 1358  Last data filed at 8/9/2024 1306  Gross per 24 hour   Intake 2620 ml   Output 3680 ml   Net -1060 ml         Physical Exam  Vitals and nursing note reviewed. Exam conducted with a chaperone present.   Constitutional:       General: He is not in acute distress.     Appearance: Normal appearance. He is normal weight. He is not ill-appearing.   Cardiovascular:      Rate and Rhythm: Normal rate and regular rhythm.      Pulses: Normal pulses.      Heart sounds: Normal heart sounds.   Pulmonary:      Effort: Pulmonary effort is normal.      Breath sounds: Normal breath sounds.   Abdominal:      General: Abdomen is flat.      Palpations: Abdomen is soft.   Musculoskeletal:      Right lower leg: Edema (right) present.      Comments: Aka left   Skin:     General: Skin is warm and dry.      Findings: Lesion present. No erythema or rash.      Comments: Wound RLE see nurses' notes and  photos   Neurological:      General: No focal deficit present.      Mental Status: He is alert and oriented to person, place, and time. Mental status is at baseline.   Psychiatric:         Mood and Affect: Mood normal.         Behavior: Behavior normal.             Significant Labs: All pertinent labs within the past 24 hours have been reviewed.  CBC:   Recent Labs   Lab 08/08/24  0500 08/09/24  0447   WBC 10.71 9.23   HGB 8.0* 8.1*   HCT 24.4* 26.4*   * 575*     CMP:   Recent Labs   Lab 08/08/24  0516 08/09/24  0447   * 134*   K 4.1 4.3    109   CO2 21 23   BUN 14 16   CREATININE 0.94 0.86   CALCIUM 8.2* 8.3*   ALBUMIN 1.9* 1.9*   BILITOT 0.5 0.4   ALKPHOS 195* 182*   AST 16* 16*   ALT 10 8       Significant Imaging: I have reviewed all pertinent imaging results/findings within the past 24 hours.

## 2024-08-09 NOTE — PLAN OF CARE
Received call from [place] at [facility] stating they will accept patient at [facility] and are sending off for [facility] authorization

## 2024-08-10 ENCOUNTER — ANESTHESIA (OUTPATIENT)
Dept: GASTROENTEROLOGY | Facility: HOSPITAL | Age: 59
End: 2024-08-10
Payer: MEDICAID

## 2024-08-10 ENCOUNTER — ANESTHESIA EVENT (OUTPATIENT)
Dept: GASTROENTEROLOGY | Facility: HOSPITAL | Age: 59
End: 2024-08-10
Payer: MEDICAID

## 2024-08-10 LAB
ANION GAP SERPL CALC-SCNC: 5 MEQ/L (ref 2–13)
BASOPHILS # BLD AUTO: 0.01 X10(3)/MCL (ref 0.01–0.08)
BASOPHILS NFR BLD AUTO: 0.1 % (ref 0.1–1.2)
BUN SERPL-MCNC: 17 MG/DL (ref 7–20)
CALCIUM SERPL-MCNC: 8.8 MG/DL (ref 8.4–10.2)
CHLORIDE SERPL-SCNC: 108 MMOL/L (ref 98–110)
CO2 SERPL-SCNC: 19 MMOL/L (ref 21–32)
CREAT SERPL-MCNC: 0.72 MG/DL (ref 0.66–1.25)
CREAT/UREA NIT SERPL: 24 (ref 12–20)
EOSINOPHIL # BLD AUTO: 0 X10(3)/MCL (ref 0.04–0.54)
EOSINOPHIL NFR BLD AUTO: 0 % (ref 0.7–7)
ERYTHROCYTE [DISTWIDTH] IN BLOOD BY AUTOMATED COUNT: 20.9 %
GFR SERPLBLD CREATININE-BSD FMLA CKD-EPI: >90 ML/MIN/1.73/M2
GLUCOSE SERPL-MCNC: 201 MG/DL (ref 70–115)
HCT VFR BLD AUTO: 30.1 % (ref 36–52)
HGB BLD-MCNC: 9.3 G/DL (ref 13–18)
IMM GRANULOCYTES # BLD AUTO: 0.09 X10(3)/MCL (ref 0–0.03)
IMM GRANULOCYTES NFR BLD AUTO: 0.7 % (ref 0–0.5)
LYMPHOCYTES # BLD AUTO: 0.64 X10(3)/MCL (ref 1.32–3.57)
LYMPHOCYTES NFR BLD AUTO: 4.9 % (ref 20–55)
MCH RBC QN AUTO: 25.5 PG (ref 27–34)
MCHC RBC AUTO-ENTMCNC: 30.9 G/DL (ref 31–37)
MCV RBC AUTO: 82.7 FL (ref 79–99)
MONOCYTES # BLD AUTO: 0.33 X10(3)/MCL (ref 0.3–0.82)
MONOCYTES NFR BLD AUTO: 2.5 % (ref 4.7–12.5)
NEUTROPHILS # BLD AUTO: 12.06 X10(3)/MCL (ref 1.78–5.38)
NEUTROPHILS NFR BLD AUTO: 91.8 % (ref 37–73)
PLATELET # BLD AUTO: 683 X10(3)/MCL (ref 140–371)
PLATELET # BLD EST: ABNORMAL 10*3/UL
PMV BLD AUTO: ABNORMAL FL
POCT GLUCOSE: 119 MG/DL (ref 70–110)
POCT GLUCOSE: 131 MG/DL (ref 70–110)
POCT GLUCOSE: 146 MG/DL (ref 70–110)
POTASSIUM SERPL-SCNC: 4.9 MMOL/L (ref 3.5–5.1)
RBC # BLD AUTO: 3.64 X10(6)/MCL (ref 4–6)
SODIUM SERPL-SCNC: 132 MMOL/L (ref 136–145)
WBC # BLD AUTO: 13.13 X10(3)/MCL (ref 4–11.5)

## 2024-08-10 PROCEDURE — 0DB48ZX EXCISION OF ESOPHAGOGASTRIC JUNCTION, VIA NATURAL OR ARTIFICIAL OPENING ENDOSCOPIC, DIAGNOSTIC: ICD-10-PCS | Performed by: SURGERY

## 2024-08-10 PROCEDURE — 63600175 PHARM REV CODE 636 W HCPCS: Performed by: FAMILY MEDICINE

## 2024-08-10 PROCEDURE — 25000003 PHARM REV CODE 250: Performed by: FAMILY MEDICINE

## 2024-08-10 PROCEDURE — 21400001 HC TELEMETRY ROOM

## 2024-08-10 PROCEDURE — 25000003 PHARM REV CODE 250: Performed by: INTERNAL MEDICINE

## 2024-08-10 PROCEDURE — 94761 N-INVAS EAR/PLS OXIMETRY MLT: CPT

## 2024-08-10 PROCEDURE — 43239 EGD BIOPSY SINGLE/MULTIPLE: CPT | Performed by: SURGERY

## 2024-08-10 PROCEDURE — 85025 COMPLETE CBC W/AUTO DIFF WBC: CPT | Performed by: FAMILY MEDICINE

## 2024-08-10 PROCEDURE — 63600175 PHARM REV CODE 636 W HCPCS: Performed by: SURGERY

## 2024-08-10 PROCEDURE — 99900035 HC TECH TIME PER 15 MIN (STAT)

## 2024-08-10 PROCEDURE — 80048 BASIC METABOLIC PNL TOTAL CA: CPT | Performed by: FAMILY MEDICINE

## 2024-08-10 PROCEDURE — 36415 COLL VENOUS BLD VENIPUNCTURE: CPT | Performed by: FAMILY MEDICINE

## 2024-08-10 PROCEDURE — 0DB78ZX EXCISION OF STOMACH, PYLORUS, VIA NATURAL OR ARTIFICIAL OPENING ENDOSCOPIC, DIAGNOSTIC: ICD-10-PCS | Performed by: SURGERY

## 2024-08-10 PROCEDURE — 25000003 PHARM REV CODE 250: Performed by: NURSE ANESTHETIST, CERTIFIED REGISTERED

## 2024-08-10 PROCEDURE — 25000003 PHARM REV CODE 250: Performed by: SURGERY

## 2024-08-10 PROCEDURE — 63600175 PHARM REV CODE 636 W HCPCS: Performed by: INTERNAL MEDICINE

## 2024-08-10 RX ORDER — POLYETHYLENE GLYCOL 3350 17 G/17G
17 POWDER, FOR SOLUTION ORAL
Status: COMPLETED | OUTPATIENT
Start: 2024-08-10 | End: 2024-08-11

## 2024-08-10 RX ORDER — PROPOFOL 10 MG/ML
VIAL (ML) INTRAVENOUS
Status: DISCONTINUED | OUTPATIENT
Start: 2024-08-10 | End: 2024-08-10

## 2024-08-10 RX ORDER — LIDOCAINE HYDROCHLORIDE 20 MG/ML
INJECTION INTRAVENOUS
Status: DISCONTINUED | OUTPATIENT
Start: 2024-08-10 | End: 2024-08-10

## 2024-08-10 RX ORDER — BISACODYL 5 MG
10 TABLET, DELAYED RELEASE (ENTERIC COATED) ORAL ONCE
Status: COMPLETED | OUTPATIENT
Start: 2024-08-10 | End: 2024-08-10

## 2024-08-10 RX ADMIN — LIDOCAINE HYDROCHLORIDE 50 MG: 20 INJECTION, SOLUTION INTRAVENOUS at 06:08

## 2024-08-10 RX ADMIN — POLYETHYLENE GLYCOL 3350 17 G: 17 POWDER, FOR SOLUTION ORAL at 09:08

## 2024-08-10 RX ADMIN — POLYETHYLENE GLYCOL 3350 17 G: 17 POWDER, FOR SOLUTION ORAL at 08:08

## 2024-08-10 RX ADMIN — Medication 70 MG: at 06:08

## 2024-08-10 RX ADMIN — IRON SUCROSE 100 MG: 20 INJECTION, SOLUTION INTRAVENOUS at 08:08

## 2024-08-10 RX ADMIN — BISACODYL 10 MG: 5 TABLET, COATED ORAL at 07:08

## 2024-08-10 RX ADMIN — SODIUM CHLORIDE: 9 INJECTION, SOLUTION INTRAVENOUS at 07:08

## 2024-08-10 RX ADMIN — PIPERACILLIN SODIUM AND TAZOBACTAM SODIUM 4.5 G: 4; .5 INJECTION, POWDER, LYOPHILIZED, FOR SOLUTION INTRAVENOUS at 05:08

## 2024-08-10 RX ADMIN — POLYETHYLENE GLYCOL 3350 17 G: 17 POWDER, FOR SOLUTION ORAL at 07:08

## 2024-08-10 RX ADMIN — CEFTRIAXONE SODIUM 1 G: 1 INJECTION, POWDER, FOR SOLUTION INTRAMUSCULAR; INTRAVENOUS at 10:08

## 2024-08-10 RX ADMIN — SODIUM CHLORIDE: 9 INJECTION, SOLUTION INTRAVENOUS at 10:08

## 2024-08-10 RX ADMIN — GLYCOPYRROLATE 0.2 MG: 0.2 INJECTION INTRAMUSCULAR; INTRAVENOUS at 05:08

## 2024-08-10 RX ADMIN — INSULIN ASPART 1 UNITS: 100 INJECTION, SOLUTION INTRAVENOUS; SUBCUTANEOUS at 06:08

## 2024-08-10 RX ADMIN — POLYETHYLENE GLYCOL 3350 17 G: 17 POWDER, FOR SOLUTION ORAL at 11:08

## 2024-08-10 RX ADMIN — Medication 30 MG: at 06:08

## 2024-08-10 NOTE — H&P (VIEW-ONLY)
Patient was a 58-year-old  male with a history of anemia severe hemoglobin 5.1 hematocrit 17.7 with a white count of 13.49 and a temperature of 103°.  Patient was admitted to the [facility] on 08/06/2024 2 units packed cells were given and he was started on Zosyn and vancomycin.  It was believed that his infection and chronic anemia was emanating from his right foot which appears to have osteomyelitis.  He was noted to have cultures on 08/06/2024 that demonstrated Proteus and beta-hemolytic strep.  Patient was presently on Rocephin which covers his Proteus and I added penicillin G5 million units q.6 to cover his beta-hemolytic strep.  Patient in addition to this had ultrasound with ABIs showed a 1.22 index consistent with microvascular calcification patient had an MRI and x-rays of the foot that showed midfoot bony destruction of the talus calcaneus navicular cuneiform bones as well as the 3rd metatarsal consistent with osteomyelitis.  Had a long discussion with the patient with regard to possibilities of amputation versus debridement.  He has had a previous BKA on the left side done at [facility] when he was in a diabetic coma.  A right side BKA or AKA might be necessary.  Angiographic evaluation of his lower system I think would be reasonable given his ABIs showed an index of 1.22.  Patient does not want an amputation at this point in time.  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    Allergies   1. Dilaudid reaction unknown   2. Iodine causes swelling    Past medical history   1. Chronic diastolic congestive heart failure with moderate tricuspid and mitral regurg on echo 05/18/2023 with an ejection fraction of 50% patient was takes spironolactone  2. Hyperlipidemia   3. Hypertension on amlodipine and spironolactone  4. Iron-deficiency anemia   5. Type 2  diabetes with a A1c between 4 and 5.5 on insulin  6. Shingles on the back   7. Chronic venous stasis disease of the lower extremity with obvious chronic osteomyelitis of the right foot treated with Santyl  8. Gastroesophageal reflux disease on Pepcid    Past surgical history  1. Multiple right leg foot debridements noted on 2023 Dr. Haynes  2. 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  This was done a few years ago  3. No EGD or colonoscopy   4. No stress test   5. Echocardiogram done on 2023 results noted above  6. Angiogram none  7. Patient has had metatarsal amputation of the 1st and 2nd right foot on 2023 Dr. Haynes    Immunizations   1. Tetanus shot over 5 years ago  2. Flu shot  3. COVID-19 shot x2   4. No pneumonia shot  5. No shingle shot   6. No hepatitis shot        Family history   1. Mother had diabetes  of a stroke   2. Sister had diabetes   3. Father  of a heart attack    Social history   1. Does not smoke   2. Quit drinking beer 2-3 weeks ago but used to drink 224 oz a day for about 35 years  4. No drug abuse history   5. No retirement parole or probation   6. 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 Patient had 1 year of community college in business education  8. 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 He is unmarried single had 3 children 3 different partners as contact with his children is got a son that works at [employer] another son that works at [employer] in [place] and is majoring in pharmacy and a 3rd son that  has his own [employer] business   10. He lives in [facility]   11. Primary care provider is Dr. URIAH murdock    Review of systems   Twelve point review of systems otherwise unremarkable  He does have  a wound on his right leg he is positive for joint swelling in that right foot and leg region he has some weakness fatigue and associated fever chills    Physical exam   He is 5 ft 10 inches 189 lb  Head ears nose throat is normal   Heart is regular rate rhythm   Lungs are clear to auscultation   Abdomen is soft nontender nondistended   Extremities have no clubbing cyanosis edema patient was status post left below-knee amputation has lymphangitis and chronic venous stasis as well as osteomyelitis of the right foot he states he was ambulating on this foot just 2 or 3 weeks ago  Neurologically intact with no motor or sensory asymmetry  Head ears nose throat is normal    Laboratory studies   Noted above    Assessment  Patient was a 58-year-old  male with a history of anemia severe hemoglobin 5.1 hematocrit 17.7 with a white count of 13.49 and a temperature of 103°.  Patient was admitted to the [facility] on 08/06/2024 2 units packed cells were given and he was started on Zosyn and vancomycin.  It was believed that his infection and chronic anemia was emanating from his right foot which appears to have osteomyelitis.  He was noted to have cultures on 08/06/2024 that demonstrated Proteus and beta-hemolytic strep.  Patient was presently on Rocephin which covers his Proteus and I added penicillin G5 million units q.6 to cover his beta-hemolytic strep.  Patient in addition to this had ultrasound with ABIs showed a 1.22 index consistent with microvascular calcification patient had an MRI and x-rays of the foot that showed midfoot bony destruction of the talus calcaneus navicular cuneiform bones as well as the 3rd metatarsal consistent with osteomyelitis.  Had a long discussion with the patient with regard to possibilities of amputation versus debridement.  He has had a previous BKA on the left side done at [facility] when he was in a diabetic coma.  A right side BKA or AKA might be  necessary.  Angiographic evaluation of his lower system I think would be reasonable given his ABIs showed an index of 1.22.  Patient does not want an amputation at this point in time.  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    Plan  1. Finish transfusion  2. Discuss ABIs with Cardiology see if angiogram would be appropriate   3. Amputation has been discussed with the patient but he is definitely against it   4. Debridement of the wound and drainage would be an option   5. LTAC placement for chronic long-term IV antibiotic therapy might be a consideration as well  6. He was anemic he will be transfused and consideration for endoscopic evaluation of the upper lower gastrointestinal tract we will be made he has never had an EGD or colonoscopy done  7. Obtained and TSH and PSA

## 2024-08-10 NOTE — ASSESSMENT & PLAN NOTE
An MRI of the right foot shows  osteomyelitis  Consulted general surgery for debridement.  Continue iv abx

## 2024-08-10 NOTE — PROGRESS NOTES
Patient was a 58-year-old  male with a history of anemia severe hemoglobin 5.1 hematocrit 17.7 with a white count of 13.49 and a temperature of 103°.  Patient was admitted to the [facility] on 08/06/2024 2 units packed cells were given and he was started on Zosyn and vancomycin.  It was believed that his infection and chronic anemia was emanating from his right foot which appears to have osteomyelitis.  He was noted to have cultures on 08/06/2024 that demonstrated Proteus and beta-hemolytic strep.  Patient was presently on Rocephin which covers his Proteus and I added penicillin G5 million units q.6 to cover his beta-hemolytic strep.  Patient in addition to this had ultrasound with ABIs showed a 1.22 index consistent with microvascular calcification patient had an MRI and x-rays of the foot that showed midfoot bony destruction of the talus calcaneus navicular cuneiform bones as well as the 3rd metatarsal consistent with osteomyelitis.  Had a long discussion with the patient with regard to possibilities of amputation versus debridement.  He has had a previous B[place] on the left side [place] when he was in a diabetic coma.  A right side BKA or AKA might be necessary.  Angiographic evaluation of his lower system I think would be reasonable given his ABIs showed an index of 1.22.  Patient does not want an amputation at this point in time.  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     Plan  1. Finish transfusion  2. Discuss ABIs with Cardiology see if angiogram would be appropriate   3. Amputation has been discussed with the patient but he is definitely against it   4. Debridement of the wound and drainage would be an option   5. LTAC placement for chronic long-term IV antibiotic therapy might be a consideration as well  6. He was anemic he will be transfused  and consideration for endoscopic evaluation of the upper lower gastrointestinal tract we will be made he has never had an EGD or colonoscopy done  7. Obtained and TSH and PSA      08/07/2024   Vital signs are stable afebrile   Debridement discussions were made but I am feel fairly strongly about amputation as his best option   I am going to discuss this with a little further and see if he would be agreeable   White count 12 hemoglobin 6.4 hematocrit 20.6 and platelet count is 474 renal profile is unremarkable CO2 is up to 24 albumin is low at 2.1 consistent with chronic protein malnutrition patient was vancomycin levels were normal MRI of the feet on 08/07/2020 demonstrates a large ulcer in the heel region extending to the cortical margin of the inferior tibiofibular with severe edema of the distal tibia and fibula suspected consistent with the osteomyelitis.  Patient in addition to this has edema with necrotic partial destruction of the talus bone navicular cuneiform and 3rd metatarsal.  He has had previous amputation of the 1st and 2nd metatarsal  Cardiac workup with regard to angiography I think would be a consideration given his falsely elevated ankle-brachial indices of 1.22 consistent with diabetic microcalcification.

## 2024-08-10 NOTE — SUBJECTIVE & OBJECTIVE
Interval History:      Review of Systems   Constitutional:  Negative for appetite change, fatigue and fever.   Respiratory:  Negative for cough, shortness of breath and wheezing.    Cardiovascular:  Negative for chest pain and leg swelling.   Gastrointestinal:  Negative for abdominal distention, abdominal pain, constipation, diarrhea, nausea and vomiting.   Skin:  Negative for color change, pallor, rash and wound.   Neurological:  Negative for tremors, syncope and headaches.   Psychiatric/Behavioral:  Negative for agitation and behavioral problems.      Objective:     Vital Signs (Most Recent):  Temp: 97.6 °F (36.4 °C) (08/10/24 0720)  Pulse: 76 (08/10/24 0725)  Resp: 20 (08/10/24 0720)  BP: (!) 142/80 (08/10/24 0720)  SpO2: 97 % (08/10/24 1100) Vital Signs (24h Range):  Temp:  [96.8 °F (36 °C)-99.3 °F (37.4 °C)] 97.6 °F (36.4 °C)  Pulse:  [73-94] 76  Resp:  [20] 20  SpO2:  [97 %-100 %] 97 %  BP: (124-146)/(71-82) 142/80     Weight: 86.9 kg (191 lb 8 oz)  Body mass index is 27.48 kg/m².    Intake/Output Summary (Last 24 hours) at 8/10/2024 1249  Last data filed at 8/10/2024 0558  Gross per 24 hour   Intake 3525 ml   Output 1630 ml   Net 1895 ml         Physical Exam  Vitals and nursing note reviewed. Exam conducted with a chaperone present.   Constitutional:       General: He is not in acute distress.     Appearance: Normal appearance. He is normal weight. He is not ill-appearing.   Cardiovascular:      Rate and Rhythm: Normal rate and regular rhythm.      Pulses: Normal pulses.      Heart sounds: Normal heart sounds.   Pulmonary:      Effort: Pulmonary effort is normal.      Breath sounds: Normal breath sounds.   Abdominal:      General: Abdomen is flat.      Palpations: Abdomen is soft.   Musculoskeletal:      Right lower leg: Edema (right) present.      Comments: Aka left   Skin:     General: Skin is warm and dry.      Findings: Lesion present. No erythema or rash.      Comments: Wound RLE see nurses' notes and  photos   Neurological:      General: No focal deficit present.      Mental Status: He is alert and oriented to person, place, and time. Mental status is at baseline.   Psychiatric:         Mood and Affect: Mood normal.         Behavior: Behavior normal.             Significant Labs: All pertinent labs within the past 24 hours have been reviewed.  CBC:   Recent Labs   Lab 08/09/24  0447 08/10/24  0748   WBC 9.23 13.13*   HGB 8.1* 9.3*   HCT 26.4* 30.1*   * 683*     CMP:   Recent Labs   Lab 08/09/24 0447 08/10/24  0748   * 132*   K 4.3 4.9    108   CO2 23 19*   BUN 16 17   CREATININE 0.86 0.72   CALCIUM 8.3* 8.8   ALBUMIN 1.9*  --    BILITOT 0.4  --    ALKPHOS 182*  --    AST 16*  --    ALT 8  --        Significant Imaging: I have reviewed all pertinent imaging results/findings within the past 24 hours.

## 2024-08-10 NOTE — CONSULTS
Patient was a 58-year-old  male with a history of anemia severe hemoglobin 5.1 hematocrit 17.7 with a white count of 13.49 and a temperature of 103°.  Patient was admitted to the [facility] on 08/06/2024 2 units packed cells were given and he was started on Zosyn and vancomycin.  It was believed that his infection and chronic anemia was emanating from his right foot which appears to have osteomyelitis.  He was noted to have cultures on 08/06/2024 that demonstrated Proteus and beta-hemolytic strep.  Patient was presently on Rocephin which covers his Proteus and I added penicillin G5 million units q.6 to cover his beta-hemolytic strep.  Patient in addition to this had ultrasound with ABIs showed a 1.22 index consistent with microvascular calcification patient had an MRI and x-rays of the foot that showed midfoot bony destruction of the talus calcaneus navicular cuneiform bones as well as the 3rd metatarsal consistent with osteomyelitis.  Had a long discussion with the patient with regard to possibilities of amputation versus debridement.  He has had a previous BKA on the left side done at [facility] when he was in a diabetic coma.  A right side BKA or AKA might be necessary.  Angiographic evaluation of his lower system I think would be reasonable given his ABIs showed an index of 1.22.  Patient does not want an amputation at this point in time.  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    Allergies   1. Dilaudid reaction unknown   2. Iodine causes swelling    Past medical history   1. Chronic diastolic congestive heart failure with moderate tricuspid and mitral regurg on echo 05/18/2023 with an ejection fraction of 50% patient was takes spironolactone  2. Hyperlipidemia   3. Hypertension on amlodipine and spironolactone  4. Iron-deficiency anemia   5. Type 2  diabetes with a A1c between 4 and 5.5 on insulin  6. Shingles on the back   7. Chronic venous stasis disease of the lower extremity with obvious chronic osteomyelitis of the right foot treated with Santyl  8. Gastroesophageal reflux disease on Pepcid    Past surgical history  1. Multiple right leg foot debridements noted on 2023 Dr. Haynes  2. 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  This was done a few years ago  3. No EGD or colonoscopy   4. No stress test   5. Echocardiogram done on 2023 results noted above  6. Angiogram none  7. Patient has had metatarsal amputation of the 1st and 2nd right foot on 2023 Dr. Haynes    Immunizations   1. Tetanus shot over 5 years ago  2. Flu shot  3. COVID-19 shot x2   4. No pneumonia shot  5. No shingle shot   6. No hepatitis shot        Family history   1. Mother had diabetes  of a stroke   2. Sister had diabetes   3. Father  of a heart attack    Social history   1. Does not smoke   2. Quit drinking beer 2-3 weeks ago but used to drink 224 oz a day for about 35 years  4. No drug abuse history   5. No [facility] parole or probation   6. 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 Patient had 1 year of community college in business education  8. 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 He is unmarried single had 3 children 3 different partners as contact with his children is got a son that works at [employer] another son that works at [employer] in as and is majoring in pharmacy and a 3rd son that  has his own [employer] business   10. He lives in [facility]   11. Primary care provider is Dr. URIAH murdock    Review of systems   Twelve point review of systems otherwise unremarkable  He does have  a wound on his right leg he is positive for joint swelling in that right foot and leg region he has some weakness fatigue and associated fever chills    Physical exam   He is 5 ft 10 inches 189 lb  Head ears nose throat is normal   Heart is regular rate rhythm   Lungs are clear to auscultation   Abdomen is soft nontender nondistended   Extremities have no clubbing cyanosis edema patient was status post left below-knee amputation has lymphangitis and chronic venous stasis as well as osteomyelitis of the right foot he states he was ambulating on this foot just 2 or 3 weeks ago  Neurologically intact with no motor or sensory asymmetry  Head ears nose throat is normal    Laboratory studies   Noted above    Assessment  Patient was a 58-year-old  male with a history of anemia severe hemoglobin 5.1 hematocrit 17.7 with a white count of 13.49 and a temperature of 103°.  Patient was admitted to the [facility] on 08/06/2024 2 units packed cells were given and he was started on Zosyn and vancomycin.  It was believed that his infection and chronic anemia was emanating from his right foot which appears to have osteomyelitis.  He was noted to have cultures on 08/06/2024 that demonstrated Proteus and beta-hemolytic strep.  Patient was presently on Rocephin which covers his Proteus and I added penicillin G5 million units q.6 to cover his beta-hemolytic strep.  Patient in addition to this had ultrasound with ABIs showed a 1.22 index consistent with microvascular calcification patient had an MRI and x-rays of the foot that showed midfoot bony destruction of the talus calcaneus navicular cuneiform bones as well as the 3rd metatarsal consistent with osteomyelitis.  Had a long discussion with the patient with regard to possibilities of amputation versus debridement.  He has had a previous BKA on the left side done at [facility] when he was in a diabetic coma.  A right side BKA or AKA might be  necessary.  Angiographic evaluation of his lower system I think would be reasonable given his ABIs showed an index of 1.22.  Patient does not want an amputation at this point in time.  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    Plan  1. Finish transfusion  2. Discuss ABIs with Cardiology see if angiogram would be appropriate   3. Amputation has been discussed with the patient but he is definitely against it   4. Debridement of the wound and drainage would be an option   5. LTAC placement for chronic long-term IV antibiotic therapy might be a consideration as well  6. He was anemic he will be transfused and consideration for endoscopic evaluation of the upper lower gastrointestinal tract we will be made he has never had an EGD or colonoscopy done  7. Obtained and TSH and PSA

## 2024-08-10 NOTE — ASSESSMENT & PLAN NOTE
S/p 4 units packed RBCs   Repeat CBC in the morning.   Iron panel from 8/6/2024 was consistent with an iron deficiency anemia.  Bushra Duckworth for egd and colonoscopy

## 2024-08-10 NOTE — ANESTHESIA POSTPROCEDURE EVALUATION
Anesthesia Post Evaluation    Patient: Honorio Robb    Procedure(s) Performed: * No procedures listed *    Final Anesthesia Type: MAC      Patient location during evaluation: GI PACU  Patient participation: Yes- Able to Participate  Level of consciousness: awake and alert, awake and oriented  Post-procedure vital signs: reviewed and stable  Pain management: adequate  Airway patency: patent    PONV status at discharge: No PONV  Anesthetic complications: no      Cardiovascular status: blood pressure returned to baseline  Respiratory status: unassisted, room air and spontaneous ventilation  Hydration status: euvolemic  Follow-up not needed.              Vitals Value Taken Time   /96 08/10/24 1645   Temp 36.4 °C (97.5 °F) 08/10/24 1645   Pulse 88 08/10/24 1645   Resp 18 08/10/24 1645   SpO2 100 % 08/10/24 1645         No case tracking events are documented in the log.      Pain/Julio Score: No data recorded

## 2024-08-10 NOTE — ANESTHESIA PREPROCEDURE EVALUATION
08/10/2024  Honorio Robb is a 58 y.o., male.    Surgical History    Procedure Laterality Date Comment Source   BELOW THE KNEE AMPUTATION Left      FOOT AMPUTATION THROUGH ANKLE Left      FOOT AMPUTATION THROUGH METATARSAL Right 05/24/2023 Procedure: AMPUTATION, FOOT, TRANSMETATARSAL;  Surgeon: Max Duckworth MD;  Location: [facility];  Service: General;  Laterality: Right;  4th and 5th toes    WOUND DEBRIDEMENT Right 05/17/2023 Procedure: DEBRIDEMENT, WOUND;  Surgeon: Max Duckworth MD;  Location: [facility] OR;  Service: General;  Laterality: Right;      Medical History    Diagnosis Date Comment Source   Chronic diastolic CHF (congestive heart failure)      Gas gangrene of left lower extremity      History of osteomyelitis of left lower extremity      Hyperlipidemia      Hypertension      Iron deficiency anemia      Type II diabetes mellitus      Wet gangrene of right lower extremity        Pre-op Assessment    I have reviewed the Patient Summary Reports.     I have reviewed the Nursing Notes. I have reviewed the NPO Status.   I have reviewed the Medications.     Review of Systems  Anesthesia Hx:  No problems with previous Anesthesia             Denies Family Hx of Anesthesia complications.    Denies Personal Hx of Anesthesia complications.                    Hematology/Oncology:    Oncology Normal    -- Anemia:               Hematology Comments: Recent blood transfusion                    EENT/Dental:  EENT/Dental Normal           Cardiovascular:  Exercise tolerance: poor   Hypertension       CHF                                 Pulmonary:  Pulmonary Normal                       Renal/:  Renal/ Normal                 Hepatic/GI:  Hepatic/GI Normal                 Musculoskeletal:  Musculoskeletal Normal                Neurological:  Neurology Normal                                       Endocrine:  Diabetes, poorly controlled           Dermatological:  Skin Normal    Psych:  Psychiatric Normal                    Physical Exam  General: Well nourished, Cooperative, Alert and Oriented    Airway:  Mallampati: II / II  Mouth Opening: Normal  TM Distance: Normal  Tongue: Large  Neck ROM: Extension Decreased, Flexion Decreased    Dental:  Intact        Anesthesia Plan  Type of Anesthesia, risks & benefits discussed:    Anesthesia Type: Gen ETT, Gen Supraglottic Airway, MAC  Intra-op Monitoring Plan: Standard ASA Monitors  Post Op Pain Control Plan: multimodal analgesia  Induction:  IV  Airway Plan: Direct  Informed Consent: Informed consent signed with the Patient and all parties understand the risks and agree with anesthesia plan.  All questions answered. Patient consented to blood products? Yes  ASA Score: 4  Day of Surgery Review of History & Physical: H&P Update referred to the surgeon/provider.I have interviewed and examined the patient. I have reviewed the patient's H&P dated: There are no significant changes.     Ready For Surgery From Anesthesia Perspective.     .

## 2024-08-10 NOTE — PROGRESS NOTES
[facility]  [facility] Medicine  Progress Note    Patient Name: Honorio Robb  MRN: [id number]  Patient Class: IP- Inpatient   Admission Date: 8/6/2024  Length of Stay: 4 days  Attending Physician: Denis Mosqueda MD  Primary Care Provider: Winston Lopez MD        Subjective:     Principal Problem:Diabetic ulcer of right heel        HPI:  Mr. Robb is a 58 year old  male with a history of type II diabetes mellitus, HTN, chronic diastolic CHF, and hyperlipidemia who presented to the ER today with a 1 day history of generalized weakness. He was in his normal state of health until 2 weeks ago when he noticed right foot swelling due to a right diabetic heel ulcer. He has had bleeding and drainage from his right foot, fevers, and chills but he denies any chest pain, shortness of breath, nausea, vomiting, or abdominal pain. He developed generalized weakness yesterday which worsened today and he presented to [facility] for further evaluation. Of note, he has a history of chronic right heel ulcer with necrotic wound s/p excisional debridement on 5/17/2023 and transmetatarsal amputation of the 4th and 5th digits on 5/24/2023. On arrival to the ER he was febrile with a temperature of 103.2, tachycardic with a heart rate of 126, and tachypneic with a respiratory rate of 30 and his initial labwork was remarkable for a WBC count of 13.49, lactic acid of 2.3, ESR of 28, CRP of 8.3, hemoglobin of 5.1, hematocrit of 17.7, iron of 221, sodium of 130, glucose of 188, magnesium of 1.7, and BNP of 4820.  His CXR showed a borderline enlarged heart with vascular congestion and mild interstitial edema and x-ray of the right foot revealed diffuse soft tissue swelling and absence of visualization of the 4th and 5th metatarsals as well as the talus with only a small residual of the posterior aspect of the calcaneus remaining and remnants of the midfoot articulations  associated with caudal displacement of the distal aspect of the fibula and tibia with surrounding air lucencies extending from the inferior aspect of the tibia and fibula to the sole of the mid and hindfoot. CT of the abdomen and pelvis demonstrated enlarged inguinal and pelvic lymph nodes haziness to the subcutaneous and adipose tissue which can be seen with generalized edema. He has received vancomycin 500 mg IV and magnesium sulfate 1 gm IV x 1 in the ER. He is otherwise in stable condition and he has no other complaints today.     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  Surgery consulted and plans for debridement today  08/08/2024  wound on RLE, anemia hgb was 6, now 8. [facility] consulted and does not want to risk stenting due to need for blood thinners which he most likley will not tolerate until source of bleeding has been determined.    08/09/2024 CTA shows significant osteomyelitis right LE, significant blockages, also LAD int he pelvic and iliac areas.  Source of anemia discussed with surgery and pt will have prep and colonoscopy planned for am.  Doubt candidate for vascular intervention given extent of osteomyelitis will likely need AKA so risk/isabelle of vascular intervention probably too high.  08/10/2024 pt given miralax colonoscoyp prep yesterday evening, did not have any bm.  Admitted with hgb 5, needs work up for anemia prior to determining if he could potentially have vascular intervention/stents to RLE.  There is significant osteomyelitis seen in tib fib as well as foot and calcaneous discussed with surgery may end up with AKA and revascularization may not be necessary or helpful.  Continuing iv abx for now.  Pt very active and already had left BKA would like to spare his RLE as he was working and independent prior to this episode.    Interval History:      Review of Systems   Constitutional:  Negative  for appetite change, fatigue and fever.   Respiratory:  Negative for cough, shortness of breath and wheezing.    Cardiovascular:  Negative for chest pain and leg swelling.   Gastrointestinal:  Negative for abdominal distention, abdominal pain, constipation, diarrhea, nausea and vomiting.   Skin:  Negative for color change, pallor, rash and wound.   Neurological:  Negative for tremors, syncope and headaches.   Psychiatric/Behavioral:  Negative for agitation and behavioral problems.      Objective:     Vital Signs (Most Recent):  Temp: 97.6 °F (36.4 °C) (08/10/24 0720)  Pulse: 76 (08/10/24 0725)  Resp: 20 (08/10/24 0720)  BP: (!) 142/80 (08/10/24 0720)  SpO2: 97 % (08/10/24 1100) Vital Signs (24h Range):  Temp:  [96.8 °F (36 °C)-99.3 °F (37.4 °C)] 97.6 °F (36.4 °C)  Pulse:  [73-94] 76  Resp:  [20] 20  SpO2:  [97 %-100 %] 97 %  BP: (124-146)/(71-82) 142/80     Weight: 86.9 kg (191 lb 8 oz)  Body mass index is 27.48 kg/m².    Intake/Output Summary (Last 24 hours) at 8/10/2024 1249  Last data filed at 8/10/2024 0558  Gross per 24 hour   Intake 3525 ml   Output 1630 ml   Net 1895 ml         Physical Exam  Vitals and nursing note reviewed. Exam conducted with a chaperone present.   Constitutional:       General: He is not in acute distress.     Appearance: Normal appearance. He is normal weight. He is not ill-appearing.   Cardiovascular:      Rate and Rhythm: Normal rate and regular rhythm.      Pulses: Normal pulses.      Heart sounds: Normal heart sounds.   Pulmonary:      Effort: Pulmonary effort is normal.      Breath sounds: Normal breath sounds.   Abdominal:      General: Abdomen is flat.      Palpations: Abdomen is soft.   Musculoskeletal:      Right lower leg: Edema (right) present.      Comments: Aka left   Skin:     General: Skin is warm and dry.      Findings: Lesion present. No erythema or rash.      Comments: Wound RLE see nurses' notes and photos   Neurological:      General: No focal deficit present.       Mental Status: He is alert and oriented to person, place, and time. Mental status is at baseline.   Psychiatric:         Mood and Affect: Mood normal.         Behavior: Behavior normal.             Significant Labs: All pertinent labs within the past 24 hours have been reviewed.  CBC:   Recent Labs   Lab 08/09/24  0447 08/10/24  0748   WBC 9.23 13.13*   HGB 8.1* 9.3*   HCT 26.4* 30.1*   * 683*     CMP:   Recent Labs   Lab 08/09/24  0447 08/10/24  0748   * 132*   K 4.3 4.9    108   CO2 23 19*   BUN 16 17   CREATININE 0.86 0.72   CALCIUM 8.3* 8.8   ALBUMIN 1.9*  --    BILITOT 0.4  --    ALKPHOS 182*  --    AST 16*  --    ALT 8  --        Significant Imaging: I have reviewed all pertinent imaging results/findings within the past 24 hours.    Assessment/Plan:      * Diabetic ulcer of right heel  An MRI of the right foot shows  osteomyelitis  Consulted general surgery for debridement.  Continue iv abx    Sepsis  Continue vancomycin 15 mg/kg IV every 12 hours, zosyn 3.375 gm IV every 6 hours, and IV fluids. 2 sets of blood cultures have been obtained in the ER.    Redness is imroving  Underlyign osteomyelitis right leg/foot    PAD (peripheral artery disease)  US arterial abnormal to RLE  JAMAL 1.2  Likley needs revascularization will have to wait until source of bleeding has been determined per [facility]      Acute blood loss anemia  S/p 4 units packed RBCs   Repeat CBC in the morning.   Iron panel from 8/6/2024 was consistent with an iron deficiency anemia.  Give zen Duckworth for egd and colonoscopy      Chronic multifocal osteomyelitis of right foot  Acute on chronic  Will need iv abx x 6 weeks      Generalized weakness  Consult PT.    Type II diabetes mellitus  Continue SSI. Hemoglobin A1c    Lab Results   Component Value Date    HGBA1C <4.0 08/06/2024         Hypomagnesemia  improved    Hyponatremia  Continue IV fluids.     Lactic acidosis  Resolved.      Cellulitis of right foot  Continue  vancomycin 15 mg/kg IV every 12 hours and zosyn 3.375 gm IV every 6 hours.      VTE Risk Mitigation (From admission, onward)           Ordered     enoxaparin injection 40 mg  Daily         08/06/24 1954     IP VTE HIGH RISK PATIENT  Once         08/06/24 1954                    Discharge Planning   MIRYAM: 8/12/2024     Code Status: Full Code   Is the patient medically ready for discharge?:     Reason for patient still in hospital (select all that apply): Patient trending condition, Laboratory test, Treatment, and Consult recommendations  Discharge Plan A: Long-term acute care facility (LTAC)   Discharge Delays: None known at this time              Marcella Reyes MD  Department of Hospital Medicine   [facility]

## 2024-08-10 NOTE — ASSESSMENT & PLAN NOTE
Continue vancomycin 15 mg/kg IV every 12 hours, zosyn 3.375 gm IV every 6 hours, and IV fluids. 2 sets of blood cultures have been obtained in the ER.    Redness is imroving  Underlyign osteomyelitis right leg/foot

## 2024-08-11 LAB
ANION GAP SERPL CALC-SCNC: 3 MEQ/L (ref 2–13)
BACTERIA BLD CULT: NORMAL
BACTERIA BLD CULT: NORMAL
BASOPHILS # BLD AUTO: 0.08 X10(3)/MCL (ref 0.01–0.08)
BASOPHILS NFR BLD AUTO: 0.8 % (ref 0.1–1.2)
BUN SERPL-MCNC: 9 MG/DL (ref 7–20)
CALCIUM SERPL-MCNC: 9.2 MG/DL (ref 8.4–10.2)
CHLORIDE SERPL-SCNC: 112 MMOL/L (ref 98–110)
CO2 SERPL-SCNC: 23 MMOL/L (ref 21–32)
CREAT SERPL-MCNC: 0.66 MG/DL (ref 0.66–1.25)
CREAT/UREA NIT SERPL: 14 (ref 12–20)
EOSINOPHIL # BLD AUTO: 0.32 X10(3)/MCL (ref 0.04–0.54)
EOSINOPHIL NFR BLD AUTO: 3 % (ref 0.7–7)
ERYTHROCYTE [DISTWIDTH] IN BLOOD BY AUTOMATED COUNT: 19.9 %
GFR SERPLBLD CREATININE-BSD FMLA CKD-EPI: >90 ML/MIN/1.73/M2
GLUCOSE SERPL-MCNC: 79 MG/DL (ref 70–115)
HCT VFR BLD AUTO: 25.8 % (ref 36–52)
HGB BLD-MCNC: 8.2 G/DL (ref 13–18)
IMM GRANULOCYTES # BLD AUTO: 0.08 X10(3)/MCL (ref 0–0.03)
IMM GRANULOCYTES NFR BLD AUTO: 0.8 % (ref 0–0.5)
LYMPHOCYTES # BLD AUTO: 1.51 X10(3)/MCL (ref 1.32–3.57)
LYMPHOCYTES NFR BLD AUTO: 14.3 % (ref 20–55)
MCH RBC QN AUTO: 26.7 PG (ref 27–34)
MCHC RBC AUTO-ENTMCNC: 31.8 G/DL (ref 31–37)
MCV RBC AUTO: 84 FL (ref 79–99)
MONOCYTES # BLD AUTO: 0.7 X10(3)/MCL (ref 0.3–0.82)
MONOCYTES NFR BLD AUTO: 6.6 % (ref 4.7–12.5)
NEUTROPHILS # BLD AUTO: 7.85 X10(3)/MCL (ref 1.78–5.38)
NEUTROPHILS NFR BLD AUTO: 74.5 % (ref 37–73)
NRBC BLD AUTO-RTO: 0 %
PLATELET # BLD AUTO: 590 X10(3)/MCL (ref 140–371)
PMV BLD AUTO: 13.3 FL (ref 9.4–12.4)
POCT GLUCOSE: 137 MG/DL (ref 70–110)
POCT GLUCOSE: 141 MG/DL (ref 70–110)
POCT GLUCOSE: 74 MG/DL (ref 70–110)
POCT GLUCOSE: 81 MG/DL (ref 70–110)
POCT GLUCOSE: 85 MG/DL (ref 70–110)
POTASSIUM SERPL-SCNC: 4.1 MMOL/L (ref 3.5–5.1)
PSA SERPL-MCNC: 0.43 NG/ML
RBC # BLD AUTO: 3.07 X10(6)/MCL (ref 4–6)
SODIUM SERPL-SCNC: 138 MMOL/L (ref 136–145)
TSH SERPL-ACNC: 3.55 UIU/ML (ref 0.36–3.74)
WBC # BLD AUTO: 10.54 X10(3)/MCL (ref 4–11.5)

## 2024-08-11 PROCEDURE — 63600175 PHARM REV CODE 636 W HCPCS: Performed by: FAMILY MEDICINE

## 2024-08-11 PROCEDURE — 45378 DIAGNOSTIC COLONOSCOPY: CPT | Performed by: SURGERY

## 2024-08-11 PROCEDURE — 63600175 PHARM REV CODE 636 W HCPCS: Performed by: SURGERY

## 2024-08-11 PROCEDURE — 25000003 PHARM REV CODE 250: Performed by: INTERNAL MEDICINE

## 2024-08-11 PROCEDURE — 36415 COLL VENOUS BLD VENIPUNCTURE: CPT | Performed by: FAMILY MEDICINE

## 2024-08-11 PROCEDURE — 25000003 PHARM REV CODE 250: Performed by: NURSE ANESTHETIST, CERTIFIED REGISTERED

## 2024-08-11 PROCEDURE — 25000003 PHARM REV CODE 250: Performed by: SURGERY

## 2024-08-11 PROCEDURE — 25000003 PHARM REV CODE 250: Performed by: FAMILY MEDICINE

## 2024-08-11 PROCEDURE — 84443 ASSAY THYROID STIM HORMONE: CPT | Performed by: SURGERY

## 2024-08-11 PROCEDURE — 63600175 PHARM REV CODE 636 W HCPCS: Performed by: NURSE ANESTHETIST, CERTIFIED REGISTERED

## 2024-08-11 PROCEDURE — 80048 BASIC METABOLIC PNL TOTAL CA: CPT | Performed by: FAMILY MEDICINE

## 2024-08-11 PROCEDURE — 94761 N-INVAS EAR/PLS OXIMETRY MLT: CPT

## 2024-08-11 PROCEDURE — 85025 COMPLETE CBC W/AUTO DIFF WBC: CPT | Performed by: FAMILY MEDICINE

## 2024-08-11 PROCEDURE — 21400001 HC TELEMETRY ROOM

## 2024-08-11 PROCEDURE — D9220A PRA ANESTHESIA: Mod: ,,, | Performed by: NURSE ANESTHETIST, CERTIFIED REGISTERED

## 2024-08-11 PROCEDURE — 84153 ASSAY OF PSA TOTAL: CPT | Performed by: SURGERY

## 2024-08-11 PROCEDURE — 0DJD8ZZ INSPECTION OF LOWER INTESTINAL TRACT, VIA NATURAL OR ARTIFICIAL OPENING ENDOSCOPIC: ICD-10-PCS | Performed by: SURGERY

## 2024-08-11 RX ORDER — LIDOCAINE HYDROCHLORIDE 20 MG/ML
INJECTION INTRAVENOUS
Status: DISCONTINUED | OUTPATIENT
Start: 2024-08-11 | End: 2024-08-11

## 2024-08-11 RX ORDER — PROPOFOL 10 MG/ML
VIAL (ML) INTRAVENOUS
Status: DISCONTINUED | OUTPATIENT
Start: 2024-08-11 | End: 2024-08-11

## 2024-08-11 RX ORDER — GLYCOPYRROLATE 0.2 MG/ML
INJECTION INTRAMUSCULAR; INTRAVENOUS
Status: DISCONTINUED | OUTPATIENT
Start: 2024-08-11 | End: 2024-08-11

## 2024-08-11 RX ORDER — BISACODYL 5 MG
10 TABLET, DELAYED RELEASE (ENTERIC COATED) ORAL ONCE
Status: COMPLETED | OUTPATIENT
Start: 2024-08-11 | End: 2024-08-11

## 2024-08-11 RX ADMIN — PROPOFOL 30 MG: 10 INJECTION, EMULSION INTRAVENOUS at 02:08

## 2024-08-11 RX ADMIN — POLYETHYLENE GLYCOL 3350 17 G: 17 POWDER, FOR SOLUTION ORAL at 12:08

## 2024-08-11 RX ADMIN — POLYETHYLENE GLYCOL 3350 17 G: 17 POWDER, FOR SOLUTION ORAL at 02:08

## 2024-08-11 RX ADMIN — GLYCOPYRROLATE 0.2 MG: 0.2 INJECTION INTRAMUSCULAR; INTRAVENOUS at 02:08

## 2024-08-11 RX ADMIN — DEXTROSE MONOHYDRATE 5 MILLION UNITS: 5 INJECTION INTRAVENOUS at 02:08

## 2024-08-11 RX ADMIN — PROPOFOL 50 MG: 10 INJECTION, EMULSION INTRAVENOUS at 01:08

## 2024-08-11 RX ADMIN — SODIUM CHLORIDE: 9 INJECTION, SOLUTION INTRAVENOUS at 05:08

## 2024-08-11 RX ADMIN — BISACODYL 10 MG: 5 TABLET, COATED ORAL at 01:08

## 2024-08-11 RX ADMIN — SODIUM CHLORIDE: 9 INJECTION, SOLUTION INTRAVENOUS at 02:08

## 2024-08-11 RX ADMIN — POLYETHYLENE GLYCOL 3350 17 G: 17 POWDER, FOR SOLUTION ORAL at 01:08

## 2024-08-11 RX ADMIN — LIDOCAINE HYDROCHLORIDE 50 MG: 20 INJECTION, SOLUTION INTRAVENOUS at 01:08

## 2024-08-11 RX ADMIN — DEXTROSE MONOHYDRATE 5 MILLION UNITS: 5 INJECTION INTRAVENOUS at 09:08

## 2024-08-11 RX ADMIN — PROPOFOL 30 MG: 10 INJECTION, EMULSION INTRAVENOUS at 01:08

## 2024-08-11 RX ADMIN — CEFTRIAXONE SODIUM 1 G: 1 INJECTION, POWDER, FOR SOLUTION INTRAMUSCULAR; INTRAVENOUS at 09:08

## 2024-08-11 RX ADMIN — IRON SUCROSE 100 MG: 20 INJECTION, SOLUTION INTRAVENOUS at 09:08

## 2024-08-11 NOTE — ANESTHESIA POSTPROCEDURE EVALUATION
Anesthesia Post Evaluation    Patient: Honorio Robb    Procedure(s) Performed: * No procedures listed *    Final Anesthesia Type: MAC      Patient location during evaluation: GI PACU  Patient participation: Yes- Able to Participate  Level of consciousness: awake and alert, awake and oriented  Post-procedure vital signs: reviewed and stable  Pain management: adequate  Airway patency: patent    PONV status at discharge: No PONV  Anesthetic complications: no      Cardiovascular status: blood pressure returned to baseline  Respiratory status: unassisted, room air and spontaneous ventilation  Hydration status: euvolemic  Follow-up not needed.              Vitals Value Taken Time   /100 08/11/24 1115   Temp 36.7 °C (98.1 °F) 08/11/24 1115   Pulse 102 08/11/24 1115   Resp 18 08/11/24 1115   SpO2 98 % 08/11/24 1300         No case tracking events are documented in the log.      Pain/Julio Score: No data recorded

## 2024-08-11 NOTE — ANESTHESIA PREPROCEDURE EVALUATION
08/10/2024  Honorio Robb is a 58 y.o., male.    Surgical History    Procedure Laterality Date Comment Source   BELOW THE KNEE AMPUTATION Left      FOOT AMPUTATION THROUGH ANKLE Left      FOOT AMPUTATION THROUGH METATARSAL Right 05/24/2023 Procedure: AMPUTATION, FOOT, TRANSMETATARSAL;  Surgeon: Max Duckworth MD;  Location: [facility];  Service: General;  Laterality: Right;  4th and 5th toes    WOUND DEBRIDEMENT Right 05/17/2023 Procedure: DEBRIDEMENT, WOUND;  Surgeon: Max Duckworth MD;  Location: [facility] OR;  Service: General;  Laterality: Right;      Medical History    Diagnosis Date Comment Source   Chronic diastolic CHF (congestive heart failure)      Gas gangrene of left lower extremity      History of osteomyelitis of left lower extremity      Hyperlipidemia      Hypertension      Iron deficiency anemia      Type II diabetes mellitus      Wet gangrene of right lower extremity        Pre-op Assessment    I have reviewed the Patient Summary Reports.     I have reviewed the Nursing Notes. I have reviewed the NPO Status.   I have reviewed the Medications.     Review of Systems  Anesthesia Hx:  No problems with previous Anesthesia             Denies Family Hx of Anesthesia complications.    Denies Personal Hx of Anesthesia complications.                    Hematology/Oncology:    Oncology Normal    -- Anemia:               Hematology Comments: Recent blood transfusion                    EENT/Dental:  EENT/Dental Normal           Cardiovascular:  Exercise tolerance: poor   Hypertension       CHF                                 Pulmonary:  Pulmonary Normal                       Renal/:  Renal/ Normal                 Hepatic/GI:  Hepatic/GI Normal                 Musculoskeletal:  Musculoskeletal Normal                Neurological:  Neurology Normal                                       Endocrine:  Diabetes, poorly controlled           Dermatological:  Skin Normal    Psych:  Psychiatric Normal                    Physical Exam  General: Well nourished, Cooperative, Alert and Oriented    Airway:  Mallampati: II / II  Mouth Opening: Normal  TM Distance: Normal  Tongue: Large  Neck ROM: Extension Decreased, Flexion Decreased    Dental:  Intact        Anesthesia Plan  Type of Anesthesia, risks & benefits discussed:    Anesthesia Type: Gen ETT, Gen Supraglottic Airway, MAC  Intra-op Monitoring Plan: Standard ASA Monitors  Post Op Pain Control Plan: multimodal analgesia  Induction:  IV  Airway Plan: Direct  Informed Consent: Informed consent signed with the Patient and all parties understand the risks and agree with anesthesia plan.  All questions answered. Patient consented to blood products? Yes  ASA Score: 4  Day of Surgery Review of History & Physical: H&P Update referred to the surgeon/provider.I have interviewed and examined the patient. I have reviewed the patient's H&P dated: There are no significant changes.     Ready For Surgery From Anesthesia Perspective.     .

## 2024-08-11 NOTE — SUBJECTIVE & OBJECTIVE
Interval History:      Review of Systems   Constitutional:  Negative for appetite change, fatigue and fever.   Respiratory:  Negative for cough, shortness of breath and wheezing.    Cardiovascular:  Negative for chest pain and leg swelling.   Gastrointestinal:  Negative for abdominal distention, abdominal pain, constipation, diarrhea, nausea and vomiting.   Skin:  Negative for color change, pallor, rash and wound.   Psychiatric/Behavioral:  Negative for agitation and behavioral problems.      Objective:     Vital Signs (Most Recent):  Temp: 98.1 °F (36.7 °C) (08/11/24 1115)  Pulse: 102 (08/11/24 1115)  Resp: 18 (08/11/24 1115)  BP: (!) 161/100 (08/11/24 1115)  SpO2: 97 % (08/11/24 1500) Vital Signs (24h Range):  Temp:  [97.1 °F (36.2 °C)-98.7 °F (37.1 °C)] 98.1 °F (36.7 °C)  Pulse:  [] 102  Resp:  [16-20] 18  SpO2:  [95 %-100 %] 97 %  BP: (127-161)/() 161/100     Weight: 86.9 kg (191 lb 8 oz)  Body mass index is 27.48 kg/m².    Intake/Output Summary (Last 24 hours) at 8/11/2024 1529  Last data filed at 8/11/2024 1412  Gross per 24 hour   Intake 3715 ml   Output 5910 ml   Net -2195 ml         Physical Exam  Vitals and nursing note reviewed. Exam conducted with a chaperone present.   Constitutional:       General: He is not in acute distress.     Appearance: Normal appearance. He is normal weight. He is not ill-appearing.   Cardiovascular:      Rate and Rhythm: Normal rate and regular rhythm.      Pulses: Normal pulses.      Heart sounds: Normal heart sounds.   Pulmonary:      Effort: Pulmonary effort is normal.      Breath sounds: Normal breath sounds.   Abdominal:      General: Abdomen is flat.      Palpations: Abdomen is soft.   Musculoskeletal:      Right lower leg: Edema (right) present.      Comments: Aka left  Swollen right with lymphadema and wound, see nurses' notes and wound care notes   Skin:     General: Skin is warm and dry.      Findings: Lesion present. No erythema or rash.      Comments:  Wound RLE see nurses' notes and photos   Neurological:      General: No focal deficit present.      Mental Status: He is alert and oriented to person, place, and time. Mental status is at baseline.   Psychiatric:         Mood and Affect: Mood normal.         Behavior: Behavior normal.             Significant Labs: All pertinent labs within the past 24 hours have been reviewed.  CBC:   Recent Labs   Lab 08/10/24  0748 08/11/24  0640   WBC 13.13* 10.54   HGB 9.3* 8.2*   HCT 30.1* 25.8*   * 590*     CMP:   Recent Labs   Lab 08/10/24  0748 08/11/24  0640   * 138   K 4.9 4.1    112*   CO2 19* 23   BUN 17 9   CREATININE 0.72 0.66   CALCIUM 8.8 9.2       Significant Imaging: I have reviewed all pertinent imaging results/findings within the past 24 hours.

## 2024-08-11 NOTE — PROGRESS NOTES
Patient was a 58-year-old  male with a history of anemia severe hemoglobin 5.1 hematocrit 17.7 with a white count of 13.49 and a temperature of 103°.  Patient was admitted to the [facility] on 08/06/2024 2 units packed cells were given and he was started on Zosyn and vancomycin.  It was believed that his infection and chronic anemia was emanating from his right foot which appears to have osteomyelitis.  He was noted to have cultures on 08/06/2024 that demonstrated Proteus and beta-hemolytic strep.  Patient was presently on Rocephin which covers his Proteus and I added penicillin G5 million units q.6 to cover his beta-hemolytic strep.  Patient in addition to this had ultrasound with ABIs showed a 1.22 index consistent with microvascular calcification patient had an MRI and x-rays of the foot that showed midfoot bony destruction of the talus calcaneus navicular cuneiform bones as well as the 3rd metatarsal consistent with osteomyelitis.  Had a long discussion with the patient with regard to possibilities of amputation versus debridement.  He has had a previous BKA on the left side done at [facility] when he was in a diabetic coma.  A right side BKA or AKA might be necessary.  Angiographic evaluation of his lower system I think would be reasonable given his ABIs showed an index of 1.22.  Patient does not want an amputation at this point in time.  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     Plan  1. Finish transfusion  2. Discuss ABIs with Cardiology see if angiogram would be appropriate   3. Amputation has been discussed with the patient but he is definitely against it   4. Debridement of the wound and drainage would be an option   5. LTAC placement for chronic long-term IV antibiotic therapy might be a consideration as well  6. He was anemic he will be transfused  and consideration for endoscopic evaluation of the upper lower gastrointestinal tract we will be made he has never had an EGD or colonoscopy done  7. Obtained and TSH and PSA      08/07/2024   Vital signs are stable afebrile   Debridement discussions were made but I am feel fairly strongly about amputation as his best option   I am going to discuss this with a little further and see if he would be agreeable   White count 12 hemoglobin 6.4 hematocrit 20.6 and platelet count is 474 renal profile is unremarkable CO2 is up to 24 albumin is low at 2.1 consistent with chronic protein malnutrition patient was vancomycin levels were normal MRI of the feet on 08/07/2020 demonstrates a large ulcer in the heel region extending to the cortical margin of the inferior tibiofibular with severe edema of the distal tibia and fibula suspected consistent with the osteomyelitis.  Patient in addition to this has edema with necrotic partial destruction of the talus bone navicular cuneiform and 3rd metatarsal.  He has had previous amputation of the 1st and 2nd metatarsal  Cardiac workup with regard to angiography I think would be a consideration given his falsely elevated ankle-brachial indices of 1.22 consistent with diabetic microcalcification.    08/08/2024  Patient was noted to be severely anemic.  Cis was consulted with regard to angiogram and evaluation of the lower extremities.  [facility] does not want to risk stenting in providing blood thinners without a workup with regard to his anemia.  As a result he will have EGD and colonoscopy done to determine source of bleeding.  I will start prep him for colonoscopy either tomorrow or over the weekend.

## 2024-08-11 NOTE — PROGRESS NOTES
[facility] Medicine  Progress Note    Patient Name: Honorio Robb  MRN: [id number]  Patient Class: IP- Inpatient   Admission Date: 8/6/2024  Length of Stay: 5 days  Attending Physician: Denis Mosqueda MD  Primary Care Provider: Winston Lopez MD        Subjective:     Principal Problem:Acute blood loss anemia        HPI:  Mr. Robb is a 58 year old  male with a history of type II diabetes mellitus, HTN, chronic diastolic CHF, and hyperlipidemia who presented to the ER today with a 1 day history of generalized weakness. He was in his normal state of health until 2 weeks ago when he noticed right foot swelling due to a right diabetic heel ulcer. He has had bleeding and drainage from his right foot, fevers, and chills but he denies any chest pain, shortness of breath, nausea, vomiting, or abdominal pain. He developed generalized weakness yesterday which worsened today and he presented to [facility] for further evaluation. Of note, he has a history of chronic right heel ulcer with necrotic wound s/p excisional debridement on 5/17/2023 and transmetatarsal amputation of the 4th and 5th digits on 5/24/2023. On arrival to the ER he was febrile with a temperature of 103.2, tachycardic with a heart rate of 126, and tachypneic with a respiratory rate of 30 and his initial labwork was remarkable for a WBC count of 13.49, lactic acid of 2.3, ESR of 28, CRP of 8.3, hemoglobin of 5.1, hematocrit of 17.7, iron of 221, sodium of 130, glucose of 188, magnesium of 1.7, and BNP of 4820.  His CXR showed a borderline enlarged heart with vascular congestion and mild interstitial edema and x-ray of the right foot revealed diffuse soft tissue swelling and absence of visualization of the 4th and 5th metatarsals as well as the talus with only a small residual of the posterior aspect of the calcaneus remaining and remnants of the midfoot articulations associated  with caudal displacement of the distal aspect of the fibula and tibia with surrounding air lucencies extending from the inferior aspect of the tibia and fibula to the sole of the mid and hindfoot. CT of the abdomen and pelvis demonstrated enlarged inguinal and pelvic lymph nodes haziness to the subcutaneous and adipose tissue which can be seen with generalized edema. He has received vancomycin 500 mg IV and magnesium sulfate 1 gm IV x 1 in the ER. He is otherwise in stable condition and he has no other complaints today.     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  Surgery consulted and plans for debridement today  08/08/2024  wound on RLE, anemia hgb was 6, now 8. CIS consulted and does not want to risk stenting due to need for blood thinners which he most likley will not tolerate until source of bleeding has been determined.    08/09/2024 CTA shows significant osteomyelitis right LE, significant blockages, also LAD int he pelvic and iliac areas.  Source of anemia discussed with surgery and pt will have prep and colonoscopy planned for am.  Doubt candidate for vascular intervention given extent of osteomyelitis will likely need AKA so risk/isabelle of vascular intervention probably too high.  08/10/2024 pt given miralax colonoscoyp prep yesterday evening, did not have any bm.  Admitted with hgb 5, needs work up for anemia prior to determining if he could potentially have vascular intervention/stents to RLE.  There is significant osteomyelitis seen in tib fib as well as foot and calcaneous discussed with surgery may end up with AKA and revascularization may not be necessary or helpful.  Continuing iv abx for now.  Pt very active and already had left BKA would like to spare his RLE as he was working and independent prior to this episode.  08/11/2024 H&H has been stable, EGD was negative for source of bleeding, plans  for colonsocopy today.    Interval History:      Review of Systems   Constitutional:  Negative for appetite change, fatigue and fever.   Respiratory:  Negative for cough, shortness of breath and wheezing.    Cardiovascular:  Negative for chest pain and leg swelling.   Gastrointestinal:  Negative for abdominal distention, abdominal pain, constipation, diarrhea, nausea and vomiting.   Skin:  Negative for color change, pallor, rash and wound.   Psychiatric/Behavioral:  Negative for agitation and behavioral problems.      Objective:     Vital Signs (Most Recent):  Temp: 98.1 °F (36.7 °C) (08/11/24 1115)  Pulse: 102 (08/11/24 1115)  Resp: 18 (08/11/24 1115)  BP: (!) 161/100 (08/11/24 1115)  SpO2: 97 % (08/11/24 1500) Vital Signs (24h Range):  Temp:  [97.1 °F (36.2 °C)-98.7 °F (37.1 °C)] 98.1 °F (36.7 °C)  Pulse:  [] 102  Resp:  [16-20] 18  SpO2:  [95 %-100 %] 97 %  BP: (127-161)/() 161/100     Weight: 86.9 kg (191 lb 8 oz)  Body mass index is 27.48 kg/m².    Intake/Output Summary (Last 24 hours) at 8/11/2024 1529  Last data filed at 8/11/2024 1412  Gross per 24 hour   Intake 3715 ml   Output 5910 ml   Net -2195 ml         Physical Exam  Vitals and nursing note reviewed. Exam conducted with a chaperone present.   Constitutional:       General: He is not in acute distress.     Appearance: Normal appearance. He is normal weight. He is not ill-appearing.   Cardiovascular:      Rate and Rhythm: Normal rate and regular rhythm.      Pulses: Normal pulses.      Heart sounds: Normal heart sounds.   Pulmonary:      Effort: Pulmonary effort is normal.      Breath sounds: Normal breath sounds.   Abdominal:      General: Abdomen is flat.      Palpations: Abdomen is soft.   Musculoskeletal:      Right lower leg: Edema (right) present.      Comments: Aka left  Swollen right with lymphadema and wound, see nurses' notes and wound care notes   Skin:     General: Skin is warm and dry.      Findings: Lesion present. No erythema  or rash.      Comments: Wound RLE see nurses' notes and photos   Neurological:      General: No focal deficit present.      Mental Status: He is alert and oriented to person, place, and time. Mental status is at baseline.   Psychiatric:         Mood and Affect: Mood normal.         Behavior: Behavior normal.             Significant Labs: All pertinent labs within the past 24 hours have been reviewed.  CBC:   Recent Labs   Lab 08/10/24  0748 08/11/24  0640   WBC 13.13* 10.54   HGB 9.3* 8.2*   HCT 30.1* 25.8*   * 590*     CMP:   Recent Labs   Lab 08/10/24  0748 08/11/24  0640   * 138   K 4.9 4.1    112*   CO2 19* 23   BUN 17 9   CREATININE 0.72 0.66   CALCIUM 8.8 9.2       Significant Imaging: I have reviewed all pertinent imaging results/findings within the past 24 hours.    Assessment/Plan:      * Acute blood loss anemia  S/p 4 units packed RBCs   Repeat CBC in the morning.   Iron panel from 8/6/2024 was consistent with an iron deficiency anemia.  Give zen Duckworth for egd and colonoscopy      Sepsis  Continue vancomycin 15 mg/kg IV every 12 hours, zosyn 3.375 gm IV every 6 hours, and IV fluids. 2 sets of blood cultures have been obtained in the ER.    Redness is imroving  Underlyign osteomyelitis right leg/foot    PAD (peripheral artery disease)  US arterial abnormal to RLE  JAMAL 1.2  Likley needs revascularization will have to wait until source of bleeding has been determined per CIS      Diabetic ulcer of right heel  An MRI of the right foot shows  osteomyelitis  Consulted general surgery for debridement.  Continue iv abx    Chronic multifocal osteomyelitis of right foot  Acute on chronic  Will need iv abx x 6 weeks      Generalized weakness  Consult PT.    Type II diabetes mellitus  Continue SSI. Hemoglobin A1c    Lab Results   Component Value Date    HGBA1C <4.0 08/06/2024         Hypomagnesemia  improved    Hyponatremia  Continue IV fluids.     Lactic acidosis  Resolved.       Cellulitis of right foot  Continue vancomycin 15 mg/kg IV every 12 hours and zosyn 3.375 gm IV every 6 hours.      VTE Risk Mitigation (From admission, onward)           Ordered     enoxaparin injection 40 mg  Daily         08/06/24 1954     IP VTE HIGH RISK PATIENT  Once         08/06/24 1954                    Discharge Planning   MIRYAM: 8/12/2024     Code Status: Full Code   Is the patient medically ready for discharge?:     Reason for patient still in hospital (select all that apply): Patient trending condition, Laboratory test, and Treatment  Discharge Plan A: Long-term acute care facility (LTAC)   Discharge Delays: None known at this time              Marcella Reyes MD  Department of Hospital Medicine   [facility]

## 2024-08-11 NOTE — PROGRESS NOTES
Patient was a 58-year-old  male with a history of anemia severe hemoglobin 5.1 hematocrit 17.7 with a white count of 13.49 and a temperature of 103°.  Patient was admitted to the [facility] on 08/06/2024 2 units packed cells were given and he was started on Zosyn and vancomycin.  It was believed that his infection and chronic anemia was emanating from his right foot which appears to have osteomyelitis.  He was noted to have cultures on 08/06/2024 that demonstrated Proteus and beta-hemolytic strep.  Patient was presently on Rocephin which covers his Proteus and I added penicillin G5 million units q.6 to cover his beta-hemolytic strep.  Patient in addition to this had ultrasound with ABIs showed a 1.22 index consistent with microvascular calcification patient had an MRI and x-rays of the foot that showed midfoot bony destruction of the talus calcaneus navicular cuneiform bones as well as the 3rd metatarsal consistent with osteomyelitis.  Had a long discussion with the patient with regard to possibilities of amputation versus debridement.  He has had a previous BKA on the left side done at [facility] when he was in a diabetic coma.  A right side BKA or AKA might be necessary.  Angiographic evaluation of his lower system I think would be reasonable given his ABIs showed an index of 1.22.  Patient does not want an amputation at this point in time.  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     Plan  1. Finish transfusion  2. Discuss ABIs with Cardiology see if angiogram would be appropriate   3. Amputation has been discussed with the patient but he is definitely against it   4. Debridement of the wound and drainage would be an option   5. LTAC placement for chronic long-term IV antibiotic therapy might be a consideration as well  6. He was anemic he will be transfused  and consideration for endoscopic evaluation of the upper lower gastrointestinal tract we will be made he has never had an EGD or colonoscopy done  7. Obtained and TSH and PSA      08/07/2024   Vital signs are stable afebrile   Debridement discussions were made but I am feel fairly strongly about amputation as his best option   I am going to discuss this with a little further and see if he would be agreeable   White count 12 hemoglobin 6.4 hematocrit 20.6 and platelet count is 474 renal profile is unremarkable CO2 is up to 24 albumin is low at 2.1 consistent with chronic protein malnutrition patient was vancomycin levels were normal MRI of the feet on 08/07/2020 demonstrates a large ulcer in the heel region extending to the cortical margin of the inferior tibiofibular with severe edema of the distal tibia and fibula suspected consistent with the osteomyelitis.  Patient in addition to this has edema with necrotic partial destruction of the talus bone navicular cuneiform and 3rd metatarsal.  He has had previous amputation of the 1st and 2nd metatarsal  Cardiac workup with regard to angiography I think would be a consideration given his falsely elevated ankle-brachial indices of 1.22 consistent with diabetic microcalcification.    08/08/2024  Patient was noted to be severely anemic.  Cis was consulted with regard to angiogram and evaluation of the lower extremities.  CIS does not want to risk stenting in providing blood thinners without a workup with regard to his anemia.  As a result he will have EGD and colonoscopy done to determine source of bleeding.  I will start prep him for colonoscopy either tomorrow or over the weekend.    08/09/2024  Vital signs are stable  White count 9 hemoglobin 8 hematocrit 26 platelet count is 5 75 renal profile is unremarkable albumin is down to 1.9  CT angio shows blockages in the iliac vessels of the pelvic region as well as osteomyelitis of the right lower extremity.  Patient will be  prep for colonoscopy in the morning.  Antibiotics have been adjusted to accommodate his infection.  Patient has been switch to Rocephin and pen G5 100 q.6 for his Proteus mirabilis and beta-hemolytic strep.      08/10/2024  Procedure Date  8/10/24     Impression  Overall Impression:  Patient was a 58-year-old  male with a history of anemia severe hemoglobin 5.1 hematocrit 17.7 with a white count of 13.49 and a temperature of 103°.  Patient was admitted to the [facility] on 08/06/2024 2 units packed cells were given and he was started on Zosyn and vancomycin.  It was believed that his infection and chronic anemia was emanating from his right foot which appears to have osteomyelitis.  He was noted to have cultures on 08/06/2024 that demonstrated Proteus and beta-hemolytic strep.  Patient was presently on Rocephin which covers his Proteus and I added penicillin G5 million units q.6 to cover his beta-hemolytic strep.  Patient in addition to this had ultrasound with ABIs showed a 1.22 index consistent with microvascular calcification patient had an MRI and x-rays of the foot that showed midfoot bony destruction of the talus calcaneus navicular cuneiform bones as well as the 3rd metatarsal consistent with osteomyelitis.  Patient was referred to me after 2 unit transfusion and then another 2 units on 08/07/2024 for a total of 4 units with persistent anemia etiology unclear.  Patient was was scheduled for EGD today.  Patient has a prep in progress.  He is slow to move his bowels although he has had a couple of bowel movement they are still solid.  Colonoscopy probably be done tomorrow.     Duodenal in all 4 portions and into the jejunum   Normal antrum fundus and cardia of the stomach   Biopsy of the antrum taken for histo path and H pylori   Z-line at 42 cm  Biopsy of the Z-line taken for histo path   1 cm hiatal hernia   Normal esophagus cricopharyngeus muscle vocal  Recommendation  Follow up with  me in clinic      Follow up in a week to discuss biopsy results of the antrum and GE junction   Continue prep to check colonoscopy tomorrow morning

## 2024-08-12 LAB
ANION GAP SERPL CALC-SCNC: 4 MEQ/L (ref 2–13)
BASOPHILS # BLD AUTO: 0.06 X10(3)/MCL (ref 0.01–0.08)
BASOPHILS NFR BLD AUTO: 0.6 % (ref 0.1–1.2)
BUN SERPL-MCNC: 12 MG/DL (ref 7–20)
CALCIUM SERPL-MCNC: 8.9 MG/DL (ref 8.4–10.2)
CHLORIDE SERPL-SCNC: 109 MMOL/L (ref 98–110)
CO2 SERPL-SCNC: 23 MMOL/L (ref 21–32)
CREAT SERPL-MCNC: 0.94 MG/DL (ref 0.66–1.25)
CREAT/UREA NIT SERPL: 13 (ref 12–20)
EOSINOPHIL # BLD AUTO: 0.45 X10(3)/MCL (ref 0.04–0.54)
EOSINOPHIL NFR BLD AUTO: 4.6 % (ref 0.7–7)
ERYTHROCYTE [DISTWIDTH] IN BLOOD BY AUTOMATED COUNT: 20.7 %
GFR SERPLBLD CREATININE-BSD FMLA CKD-EPI: >90 ML/MIN/1.73/M2
GLUCOSE SERPL-MCNC: 116 MG/DL (ref 70–115)
HCT VFR BLD AUTO: 25.9 % (ref 36–52)
HGB BLD-MCNC: 8 G/DL (ref 13–18)
IMM GRANULOCYTES # BLD AUTO: 0.06 X10(3)/MCL (ref 0–0.03)
IMM GRANULOCYTES NFR BLD AUTO: 0.6 % (ref 0–0.5)
LYMPHOCYTES # BLD AUTO: 1.62 X10(3)/MCL (ref 1.32–3.57)
LYMPHOCYTES NFR BLD AUTO: 16.7 % (ref 20–55)
MCH RBC QN AUTO: 26.1 PG (ref 27–34)
MCHC RBC AUTO-ENTMCNC: 30.9 G/DL (ref 31–37)
MCV RBC AUTO: 84.6 FL (ref 79–99)
MONOCYTES # BLD AUTO: 0.76 X10(3)/MCL (ref 0.3–0.82)
MONOCYTES NFR BLD AUTO: 7.8 % (ref 4.7–12.5)
NEUTROPHILS # BLD AUTO: 6.74 X10(3)/MCL (ref 1.78–5.38)
NEUTROPHILS NFR BLD AUTO: 69.7 % (ref 37–73)
NRBC BLD AUTO-RTO: 0 %
PLATELET # BLD AUTO: 571 X10(3)/MCL (ref 140–371)
PMV BLD AUTO: 12.9 FL (ref 9.4–12.4)
POCT GLUCOSE: 119 MG/DL (ref 70–110)
POCT GLUCOSE: 128 MG/DL (ref 70–110)
POCT GLUCOSE: 145 MG/DL (ref 70–110)
POCT GLUCOSE: 249 MG/DL (ref 70–110)
POTASSIUM SERPL-SCNC: 4.6 MMOL/L (ref 3.5–5.1)
RBC # BLD AUTO: 3.06 X10(6)/MCL (ref 4–6)
SODIUM SERPL-SCNC: 136 MMOL/L (ref 136–145)
WBC # BLD AUTO: 9.69 X10(3)/MCL (ref 4–11.5)

## 2024-08-12 PROCEDURE — 25000003 PHARM REV CODE 250: Performed by: SURGERY

## 2024-08-12 PROCEDURE — 36415 COLL VENOUS BLD VENIPUNCTURE: CPT | Performed by: SURGERY

## 2024-08-12 PROCEDURE — 97110 THERAPEUTIC EXERCISES: CPT

## 2024-08-12 PROCEDURE — 63600175 PHARM REV CODE 636 W HCPCS: Performed by: SURGERY

## 2024-08-12 PROCEDURE — 21400001 HC TELEMETRY ROOM

## 2024-08-12 PROCEDURE — 94761 N-INVAS EAR/PLS OXIMETRY MLT: CPT

## 2024-08-12 PROCEDURE — 85025 COMPLETE CBC W/AUTO DIFF WBC: CPT | Performed by: SURGERY

## 2024-08-12 PROCEDURE — 80048 BASIC METABOLIC PNL TOTAL CA: CPT | Performed by: SURGERY

## 2024-08-12 RX ADMIN — DEXTROSE MONOHYDRATE 5 MILLION UNITS: 5 INJECTION INTRAVENOUS at 09:08

## 2024-08-12 RX ADMIN — DEXTROSE MONOHYDRATE 5 MILLION UNITS: 5 INJECTION INTRAVENOUS at 02:08

## 2024-08-12 RX ADMIN — IRON SUCROSE 100 MG: 20 INJECTION, SOLUTION INTRAVENOUS at 09:08

## 2024-08-12 RX ADMIN — CEFTRIAXONE SODIUM 1 G: 1 INJECTION, POWDER, FOR SOLUTION INTRAMUSCULAR; INTRAVENOUS at 09:08

## 2024-08-12 RX ADMIN — DEXTROSE MONOHYDRATE 5 MILLION UNITS: 5 INJECTION INTRAVENOUS at 04:08

## 2024-08-12 RX ADMIN — SODIUM CHLORIDE: 9 INJECTION, SOLUTION INTRAVENOUS at 01:08

## 2024-08-12 NOTE — ASSESSMENT & PLAN NOTE
S/p 4 units packed RBCs   Repeat CBC in the morning.   Iron panel from 8/6/2024 was consistent with an iron deficiency anemia.  Give zen Duckworth for egd and colonoscopy- diverticulosis sigmoid, grade I int hemorrhoids  H/h stable

## 2024-08-12 NOTE — PROGRESS NOTES
[facility]  Wound Care    Patient Name:  Honorio Robb   MRN:  [id number]  Date: 8/12/2024  Diagnosis: Acute blood loss anemia    History:     Past Medical History:   Diagnosis Date    Chronic diastolic CHF (congestive heart failure)     Gas gangrene of left lower extremity     History of osteomyelitis of left lower extremity     Hyperlipidemia     Hypertension     Iron deficiency anemia     Type II diabetes mellitus     Wet gangrene of right lower extremity        Social History     Socioeconomic History    Marital status: Single   Tobacco Use    Smoking status: Never     Passive exposure: Never    Smokeless tobacco: Never   Substance and Sexual Activity    Alcohol use: Never    Drug use: Never     Social Determinants of Health     Financial Resource Strain: High Risk (8/7/2024)    Overall Financial Resource Strain (CARDIA)     Difficulty of Paying Living Expenses: Very hard   Food Insecurity: Food Insecurity Present (8/7/2024)    Hunger Vital Sign     Worried About Running Out of Food in the Last Year: Often true     Ran Out of Food in the Last Year: Often true   Transportation Needs: No Transportation Needs (8/7/2024)    TRANSPORTATION NEEDS     Transportation : No   Physical Activity: Sufficiently Active (8/7/2024)    Exercise Vital Sign     Days of Exercise per Week: 7 days     Minutes of Exercise per Session: 30 min   Stress: Stress Concern Present (8/7/2024)    [place] [employer] - Occupational Stress Questionnaire     Feeling of Stress : To some extent   Housing Stability: High Risk (8/7/2024)    Housing Stability Vital Sign     Unable to Pay for Housing in the Last Year: Yes     Homeless in the Last Year: Yes       Precautions:     Allergies as of 08/06/2024 - Reviewed 08/06/2024   Allergen Reaction Noted    Dilaudid [hydromorphone]  05/17/2023    Iodinated contrast media  05/17/2023       WOC Assessment Details/Treatment        08/12/24 1012   WOCN Assessment    WOCN Total Time (mins) 15   Visit Date 08/12/24   Visit Time 1000   Consult Type Follow Up   WOCN Speciality Wound   Intervention changed        Wound 08/06/24 2150 Other (comment) Right plantar;distal Heel #1   Date First Assessed/Time First Assessed: 08/06/24 2150   Present on Original Admission: Yes  Primary Wound Type: Other (comment)  Side: Right  Orientation: plantar;distal  Location: (c) Heel  Wound Number: #1  Is this injury device related?: No   Dressing Appearance Dry;Intact;Clean   Drainage Amount Copious   Drainage Characteristics/Odor Serosanguineous;Sanguineous;Creamy;Malodorous   Appearance Red;Moist   Periwound Area Edematous   Wound Edges Defined   Care Cleansed with:  (vashe)   Dressing Applied;Gauze, wet to dry  (moistened with vashe, then applied gauze, abd pads, kerlix and ace.)        Wound 08/06/24 2150 Other (comment) Right lateral Calf #2   Date First Assessed/Time First Assessed: 08/06/24 2150   Present on Original Admission: Yes  Primary Wound Type: Other (comment)  Side: Right  Orientation: lateral  Location: Calf  Wound Number: #2  Is this injury device related?: No   Dressing Appearance Dry;Intact;Clean   Drainage Amount None   Drainage Characteristics/Odor Malodorous   Appearance Red;Moist   Tissue loss description Full thickness   Periwound Area Edematous   Wound Edges Defined   Care Cleansed with:  (vashe)   Dressing Applied;Gauze, wet to dry  (vashe moist gauze, then civered with gauze, kerlix and ace)   Dressing Change Due 08/13/24 08/12/2024

## 2024-08-12 NOTE — PT/OT/SLP PROGRESS
Physical Therapy Treatment    Patient Name:  Honorio Robb   MRN:  [id number]    Recommendations:     Discharge Recommendations:  (Continue to assess)  Discharge Equipment Recommendations: none  Barriers to discharge:  current medical status    Assessment:     Honorio Robb is a 58 y.o. male admitted with a medical diagnosis of Acute blood loss anemia.  He presents with the following impairments/functional limitations: weakness, impaired functional mobility, impaired balance     Pt found sitting EOB. Patient states that he has been doing some arm and leg exercises when he sitting up on the EOB. Patient requests some theraband to perform his exercises. Patient given red and green theraband and educated on different UE and LE exercises. Patient verbalized understanding and returned demonstration. Patient left sitting up on EOB    Rehab Prognosis: Fair; patient would benefit from acute skilled PT services to address these deficits and reach maximum level of function.    Recent Surgery: * No procedures listed * 3 Days Post-Op    Plan:     During this hospitalization, patient to be seen 5 x/week (5-6x weekly/1-2x daily) to address the identified rehab impairments via gait training, therapeutic activities, therapeutic exercises and progress toward the following goals:    Plan of Care Expires:  09/06/24    Subjective     Chief Complaint: hungry  Patient/Family Comments/goals: to get better  Pain/Comfort:         Objective:     Communicated with nursing prior to session.  Patient found sitting edge of bed with peripheral IV upon PT entry to room.     General Precautions: Standard, fall, contact  Orthopedic Precautions: N/A  Braces: N/A  Respiratory Status: Room air     Functional Mobility:  Bed Mobility:     Supine to Sit: stand by assistance      AM-PAC 6 CLICK MOBILITY          Treatment & Education:  See above    Patient left sitting edge of bed with all lines intact, call button in reach, bed alarm on, and nurse  notified..    GOALS:   Multidisciplinary Problems       Physical Therapy Goals          Problem: Physical Therapy    Goal Priority Disciplines Outcome Goal Variances Interventions   Physical Therapy Goal     PT, PT/OT Progressing     Description: Goals to be met by: discharge     Patient will increase functional independence with mobility by performin. Supine to sit with Contact Guard Assistance  2. Sit to supine with Contact Guard Assistance  3. Bed to chair transfer with Minimal Assistance using Slideboard                         Time Tracking:     PT Received On: 24  PT Start Time: 1450     PT Stop Time: 1505  PT Total Time (min): 15 min     Billable Minutes: Therapeutic Exercise 15    Treatment Type: Treatment  PT/PTA: PT           2024

## 2024-08-12 NOTE — ASSESSMENT & PLAN NOTE
An MRI of the right foot shows  osteomyelitis  general surgery for debridement.  Continue iv abx- Penicillin G

## 2024-08-12 NOTE — PLAN OF CARE
08/12/24 1320   Discharge Reassessment   Assessment Type Discharge Planning Reassessment   Did the patient's condition or plan change since previous assessment? No   Discharge Plan discussed with: Patient   Communicated MIRYAM with patient/caregiver Date not available/Unable to determine   Discharge Plan A Long-term acute care facility (LTAC)   DME Needed Upon Discharge  none   Transition of Care Barriers None   Why the patient remains in the hospital Requires continued medical care   Post-Acute Status   Post-Acute Authorization Placement  ([facility])   Post-Acute Placement Status Set-up Complete/Auth obtained   Coverage Healthy Blue   Discharge Delays None known at this time

## 2024-08-12 NOTE — SUBJECTIVE & OBJECTIVE
Interval History:      Review of Systems   Constitutional:  Negative for appetite change, fatigue and fever.   Respiratory:  Negative for cough, shortness of breath and wheezing.    Cardiovascular:  Negative for chest pain and leg swelling.   Gastrointestinal:  Negative for abdominal distention, abdominal pain, constipation, diarrhea, nausea and vomiting.   Skin:  Negative for color change, pallor, rash and wound.   Psychiatric/Behavioral:  Negative for agitation and behavioral problems.      Objective:     Vital Signs (Most Recent):  Temp: 97.8 °F (36.6 °C) (08/12/24 0701)  Pulse: 97 (08/12/24 0724)  Resp: 20 (08/12/24 0724)  BP: (!) 156/91 (08/12/24 0701)  SpO2: 99 % (08/12/24 0724) Vital Signs (24h Range):  Temp:  [97.4 °F (36.3 °C)-100.1 °F (37.8 °C)] 97.8 °F (36.6 °C)  Pulse:  [] 97  Resp:  [18-20] 20  SpO2:  [96 %-100 %] 99 %  BP: (139-157)/(82-96) 156/91     Weight: 86.9 kg (191 lb 8 oz)  Body mass index is 27.48 kg/m².    Intake/Output Summary (Last 24 hours) at 8/12/2024 1327  Last data filed at 8/12/2024 1133  Gross per 24 hour   Intake 2124 ml   Output 5000 ml   Net -2876 ml         Physical Exam  Vitals and nursing note reviewed. Exam conducted with a chaperone present.   Constitutional:       General: He is not in acute distress.     Appearance: Normal appearance. He is normal weight. He is not ill-appearing.   Cardiovascular:      Rate and Rhythm: Normal rate and regular rhythm.      Pulses: Normal pulses.      Heart sounds: Normal heart sounds.   Pulmonary:      Effort: Pulmonary effort is normal.      Breath sounds: Normal breath sounds.   Abdominal:      General: Abdomen is flat.      Palpations: Abdomen is soft.   Musculoskeletal:      Right lower leg: Edema (right) present.      Comments: Aka left  Swollen right with lymphadema and wound, see nurses' notes and wound care notes   Skin:     General: Skin is warm and dry.      Findings: Lesion present. No erythema or rash.      Comments: Wound  RLE see nurses' notes and photos   Neurological:      General: No focal deficit present.      Mental Status: He is alert and oriented to person, place, and time. Mental status is at baseline.   Psychiatric:         Mood and Affect: Mood normal.         Behavior: Behavior normal.             Significant Labs: All pertinent labs within the past 24 hours have been reviewed.  CBC:   Recent Labs   Lab 08/11/24  0640 08/12/24  0504   WBC 10.54 9.69   HGB 8.2* 8.0*   HCT 25.8* 25.9*   * 571*     CMP:   Recent Labs   Lab 08/11/24  0640 08/12/24  0504    136   K 4.1 4.6   * 109   CO2 23 23   BUN 9 12   CREATININE 0.66 0.94   CALCIUM 9.2 8.9       Significant Imaging: I have reviewed all pertinent imaging results/findings within the past 24 hours.

## 2024-08-12 NOTE — PLAN OF CARE
Received call from [place] at [facility] stating patient has been approved for admission when ready

## 2024-08-12 NOTE — PROGRESS NOTES
[facility]  Progress Note    Patient Name: Honorio Robb  MRN: [id number]  Patient Class: IP- Inpatient   Admission Date: 8/6/2024  Length of Stay: 6 days  Attending Physician: Denis Mosqueda MD  Primary Care Provider: Winston Lopez MD        Subjective:     Principal Problem:Acute blood loss anemia        HPI:  Mr. Robb is a 58 year old  male with a history of type II diabetes mellitus, HTN, chronic diastolic CHF, and hyperlipidemia who presented to the ER today with a 1 day history of generalized weakness. He was in his normal state of health until 2 weeks ago when he noticed right foot swelling due to a right diabetic heel ulcer. He has had bleeding and drainage from his right foot, fevers, and chills but he denies any chest pain, shortness of breath, nausea, vomiting, or abdominal pain. He developed generalized weakness yesterday which worsened today and he presented to [facility] for further evaluation. Of note, he has a history of chronic right heel ulcer with necrotic wound s/p excisional debridement on 5/17/2023 and transmetatarsal amputation of the 4th and 5th digits on 5/24/2023. On arrival to the ER he was febrile with a temperature of 103.2, tachycardic with a heart rate of 126, and tachypneic with a respiratory rate of 30 and his initial labwork was remarkable for a WBC count of 13.49, lactic acid of 2.3, ESR of 28, CRP of 8.3, hemoglobin of 5.1, hematocrit of 17.7, iron of 221, sodium of 130, glucose of 188, magnesium of 1.7, and BNP of 4820.  His CXR showed a borderline enlarged heart with vascular congestion and mild interstitial edema and x-ray of the right foot revealed diffuse soft tissue swelling and absence of visualization of the 4th and 5th metatarsals as well as the talus with only a small residual of the posterior aspect of the calcaneus remaining and remnants of the midfoot articulations associated  with caudal displacement of the distal aspect of the fibula and tibia with surrounding air lucencies extending from the inferior aspect of the tibia and fibula to the sole of the mid and hindfoot. CT of the abdomen and pelvis demonstrated enlarged inguinal and pelvic lymph nodes haziness to the subcutaneous and adipose tissue which can be seen with generalized edema. He has received vancomycin 500 mg IV and magnesium sulfate 1 gm IV x 1 in the ER. He is otherwise in stable condition and he has no other complaints today.     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  Surgery consulted and plans for debridement today  08/08/2024  wound on RLE, anemia hgb was 6, now 8. [facility] consulted and does not want to risk stenting due to need for blood thinners which he most likley will not tolerate until source of bleeding has been determined.    08/09/2024 CTA shows significant osteomyelitis right LE, significant blockages, also LAD int he pelvic and iliac areas.  Source of anemia discussed with surgery and pt will have prep and colonoscopy planned for am.  Doubt candidate for vascular intervention given extent of osteomyelitis will likely need AKA so risk/isabelle of vascular intervention probably too high.  08/10/2024 pt given miralax colonoscoyp prep yesterday evening, did not have any bm.  Admitted with hgb 5, needs work up for anemia prior to determining if he could potentially have vascular intervention/stents to RLE.  There is significant osteomyelitis seen in tib fib as well as foot and calcaneous discussed with surgery may end up with AKA and revascularization may not be necessary or helpful.  Continuing iv abx for now.  Pt very active and already had left BKA would like to spare his RLE as he was working and independent prior to this episode.  08/11/2024 H&H has been stable, EGD was negative for source of bleeding, plans  for colonsocopy today.  08/12/2024 H/H stable  Colonoscopy- sigmoid diverticulosis, grade I internal hemorrhoids  Wound culture R foot- proteus and beta hemolytic strep on pen G  Working on LTAC placement     Interval History:      Review of Systems   Constitutional:  Negative for appetite change, fatigue and fever.   Respiratory:  Negative for cough, shortness of breath and wheezing.    Cardiovascular:  Negative for chest pain and leg swelling.   Gastrointestinal:  Negative for abdominal distention, abdominal pain, constipation, diarrhea, nausea and vomiting.   Skin:  Negative for color change, pallor, rash and wound.   Psychiatric/Behavioral:  Negative for agitation and behavioral problems.      Objective:     Vital Signs (Most Recent):  Temp: 97.8 °F (36.6 °C) (08/12/24 0701)  Pulse: 97 (08/12/24 0724)  Resp: 20 (08/12/24 0724)  BP: (!) 156/91 (08/12/24 0701)  SpO2: 99 % (08/12/24 0724) Vital Signs (24h Range):  Temp:  [97.4 °F (36.3 °C)-100.1 °F (37.8 °C)] 97.8 °F (36.6 °C)  Pulse:  [] 97  Resp:  [18-20] 20  SpO2:  [96 %-100 %] 99 %  BP: (139-157)/(82-96) 156/91     Weight: 86.9 kg (191 lb 8 oz)  Body mass index is 27.48 kg/m².    Intake/Output Summary (Last 24 hours) at 8/12/2024 1327  Last data filed at 8/12/2024 1133  Gross per 24 hour   Intake 2124 ml   Output 5000 ml   Net -2876 ml         Physical Exam  Vitals and nursing note reviewed. Exam conducted with a chaperone present.   Constitutional:       General: He is not in acute distress.     Appearance: Normal appearance. He is normal weight. He is not ill-appearing.   Cardiovascular:      Rate and Rhythm: Normal rate and regular rhythm.      Pulses: Normal pulses.      Heart sounds: Normal heart sounds.   Pulmonary:      Effort: Pulmonary effort is normal.      Breath sounds: Normal breath sounds.   Abdominal:      General: Abdomen is flat.      Palpations: Abdomen is soft.   Musculoskeletal:      Right lower leg: Edema (right) present.       Comments: Fe left  Swollen right with lymphadema and wound, see nurses' notes and wound care notes   Skin:     General: Skin is warm and dry.      Findings: Lesion present. No erythema or rash.      Comments: Wound RLE see nurses' notes and photos   Neurological:      General: No focal deficit present.      Mental Status: He is alert and oriented to person, place, and time. Mental status is at baseline.   Psychiatric:         Mood and Affect: Mood normal.         Behavior: Behavior normal.             Significant Labs: All pertinent labs within the past 24 hours have been reviewed.  CBC:   Recent Labs   Lab 08/11/24  0640 08/12/24  0504   WBC 10.54 9.69   HGB 8.2* 8.0*   HCT 25.8* 25.9*   * 571*     CMP:   Recent Labs   Lab 08/11/24  0640 08/12/24  0504    136   K 4.1 4.6   * 109   CO2 23 23   BUN 9 12   CREATININE 0.66 0.94   CALCIUM 9.2 8.9       Significant Imaging: I have reviewed all pertinent imaging results/findings within the past 24 hours.    Assessment/Plan:      * Acute blood loss anemia  S/p 4 units packed RBCs   Repeat CBC in the morning.   Iron panel from 8/6/2024 was consistent with an iron deficiency anemia.  Give zen [place] for egd and colonoscopy- diverticulosis sigmoid, grade I int hemorrhoids  H/h stable       Chronic multifocal osteomyelitis of right foot  Acute on chronic  Will need iv abx x 6 weeks      PAD (peripheral artery disease)  US arterial abnormal to RLE  JAMAL 1.2  Likley needs revascularization will have to wait until source of bleeding has been determined per [facility]      Generalized weakness  Consult PT.    Type II diabetes mellitus  Continue SSI. Hemoglobin A1c    Lab Results   Component Value Date    HGBA1C <4.0 08/06/2024         Hypomagnesemia  improved    Hyponatremia  Continue IV fluids.     Lactic acidosis  Resolved.      Sepsis    Underlying osteomyelitis right leg/foot  Based on cultures cont iv penicillin     Cellulitis of right foot  Cont iv  penicillin q 6 hrs    Diabetic ulcer of right heel  An MRI of the right foot shows  osteomyelitis  general surgery for debridement.  Continue iv abx- Penicillin G       VTE Risk Mitigation (From admission, onward)           Ordered     enoxaparin injection 40 mg  Daily         08/06/24 1954     IP VTE HIGH RISK PATIENT  Once         08/06/24 1954                    Discharge Planning   MIRYAM: 8/16/2024     Code Status: Full Code   Is the patient medically ready for discharge?:     Reason for patient still in hospital (select all that apply): Patient trending condition, Laboratory test, Treatment, and Consult recommendations  Discharge Plan A: Long-term acute care facility (LTAC)   Discharge Delays: None known at this time              Denis Mosqueda MD  Department of Hospital Medicine   [facility]

## 2024-08-12 NOTE — PLAN OF CARE
Problem: Adult Inpatient Plan of Care  Goal: Plan of Care Review  Outcome: Progressing  Goal: Patient-Specific Goal (Individualized)  Outcome: Progressing  Goal: Absence of Hospital-Acquired Illness or Injury  Outcome: Progressing  Goal: Optimal Comfort and Wellbeing  Outcome: Progressing  Goal: Readiness for Transition of Care  Outcome: Progressing     Problem: Diabetes Comorbidity  Goal: Blood Glucose Level Within Targeted Range  Outcome: Progressing     Problem: Sepsis/Septic Shock  Goal: Optimal Coping  Outcome: Progressing  Goal: Absence of Bleeding  Outcome: Progressing  Goal: Blood Glucose Level Within Targeted Range  Outcome: Progressing  Goal: Absence of Infection Signs and Symptoms  Outcome: Progressing  Goal: Optimal Nutrition Intake  Outcome: Progressing

## 2024-08-13 LAB
ANION GAP SERPL CALC-SCNC: 0 MEQ/L (ref 2–13)
ANISOCYTOSIS BLD QL SMEAR: ABNORMAL
BASOPHILS # BLD AUTO: 0.07 X10(3)/MCL (ref 0.01–0.08)
BASOPHILS NFR BLD AUTO: 0.7 % (ref 0.1–1.2)
BUN SERPL-MCNC: 17 MG/DL (ref 7–20)
CALCIUM SERPL-MCNC: 8.7 MG/DL (ref 8.4–10.2)
CHLORIDE SERPL-SCNC: 106 MMOL/L (ref 98–110)
CO2 SERPL-SCNC: 29 MMOL/L (ref 21–32)
CREAT SERPL-MCNC: 0.79 MG/DL (ref 0.66–1.25)
CREAT/UREA NIT SERPL: 22 (ref 12–20)
ELLIPTOCYTOSIS (OHS): ABNORMAL
EOSINOPHIL # BLD AUTO: 0.41 X10(3)/MCL (ref 0.04–0.54)
EOSINOPHIL NFR BLD AUTO: 3.9 % (ref 0.7–7)
ERYTHROCYTE [DISTWIDTH] IN BLOOD BY AUTOMATED COUNT: 21.1 %
GFR SERPLBLD CREATININE-BSD FMLA CKD-EPI: >90 ML/MIN/1.73/M2
GLUCOSE SERPL-MCNC: 121 MG/DL (ref 70–115)
HCT VFR BLD AUTO: 26 % (ref 36–52)
HGB BLD-MCNC: 8.2 G/DL (ref 13–18)
IMM GRANULOCYTES # BLD AUTO: 0.14 X10(3)/MCL (ref 0–0.03)
IMM GRANULOCYTES NFR BLD AUTO: 1.3 % (ref 0–0.5)
LYMPHOCYTES # BLD AUTO: 1.85 X10(3)/MCL (ref 1.32–3.57)
LYMPHOCYTES NFR BLD AUTO: 17.4 % (ref 20–55)
MCH RBC QN AUTO: 26 PG (ref 27–34)
MCHC RBC AUTO-ENTMCNC: 31.5 G/DL (ref 31–37)
MCV RBC AUTO: 82.5 FL (ref 79–99)
MICROCYTES BLD QL SMEAR: ABNORMAL
MONOCYTES # BLD AUTO: 0.85 X10(3)/MCL (ref 0.3–0.82)
MONOCYTES NFR BLD AUTO: 8 % (ref 4.7–12.5)
NEUTROPHILS # BLD AUTO: 7.3 X10(3)/MCL (ref 1.78–5.38)
NEUTROPHILS NFR BLD AUTO: 68.7 % (ref 37–73)
NRBC BLD AUTO-RTO: 0 %
PLATELET # BLD AUTO: 532 X10(3)/MCL (ref 140–371)
PLATELET # BLD EST: ABNORMAL 10*3/UL
PMV BLD AUTO: ABNORMAL FL
POCT GLUCOSE: 112 MG/DL (ref 70–110)
POCT GLUCOSE: 133 MG/DL (ref 70–110)
POCT GLUCOSE: 146 MG/DL (ref 70–110)
POCT GLUCOSE: 165 MG/DL (ref 70–110)
POIKILOCYTOSIS BLD QL SMEAR: ABNORMAL
POTASSIUM SERPL-SCNC: 4 MMOL/L (ref 3.5–5.1)
RBC # BLD AUTO: 3.15 X10(6)/MCL (ref 4–6)
RBC MORPH BLD: ABNORMAL
SCHISTOCYTE (OLG): ABNORMAL
SODIUM SERPL-SCNC: 135 MMOL/L (ref 136–145)
WBC # BLD AUTO: 10.62 X10(3)/MCL (ref 4–11.5)

## 2024-08-13 PROCEDURE — 25000003 PHARM REV CODE 250: Performed by: FAMILY MEDICINE

## 2024-08-13 PROCEDURE — 94761 N-INVAS EAR/PLS OXIMETRY MLT: CPT

## 2024-08-13 PROCEDURE — 80048 BASIC METABOLIC PNL TOTAL CA: CPT | Performed by: SURGERY

## 2024-08-13 PROCEDURE — 63600175 PHARM REV CODE 636 W HCPCS: Performed by: SURGERY

## 2024-08-13 PROCEDURE — 97110 THERAPEUTIC EXERCISES: CPT

## 2024-08-13 PROCEDURE — 36415 COLL VENOUS BLD VENIPUNCTURE: CPT | Performed by: SURGERY

## 2024-08-13 PROCEDURE — 25000003 PHARM REV CODE 250: Performed by: SURGERY

## 2024-08-13 PROCEDURE — 85025 COMPLETE CBC W/AUTO DIFF WBC: CPT | Performed by: SURGERY

## 2024-08-13 PROCEDURE — 21400001 HC TELEMETRY ROOM

## 2024-08-13 PROCEDURE — 63600175 PHARM REV CODE 636 W HCPCS: Performed by: FAMILY MEDICINE

## 2024-08-13 RX ADMIN — DEXTROSE MONOHYDRATE 5 MILLION UNITS: 5 INJECTION INTRAVENOUS at 08:08

## 2024-08-13 RX ADMIN — DEXTROSE MONOHYDRATE 2 G: 5 INJECTION INTRAVENOUS at 10:08

## 2024-08-13 RX ADMIN — IRON SUCROSE 100 MG: 20 INJECTION, SOLUTION INTRAVENOUS at 08:08

## 2024-08-13 RX ADMIN — DEXTROSE MONOHYDRATE 5 MILLION UNITS: 5 INJECTION INTRAVENOUS at 09:08

## 2024-08-13 RX ADMIN — DEXTROSE MONOHYDRATE 5 MILLION UNITS: 5 INJECTION INTRAVENOUS at 03:08

## 2024-08-13 NOTE — SUBJECTIVE & OBJECTIVE
Interval History:      Review of Systems   Constitutional:  Negative for appetite change, fatigue and fever.   Respiratory:  Negative for cough, shortness of breath and wheezing.    Cardiovascular:  Negative for chest pain and leg swelling.   Gastrointestinal:  Negative for abdominal distention, abdominal pain, constipation, diarrhea, nausea and vomiting.   Skin:  Negative for color change, pallor, rash and wound.   Psychiatric/Behavioral:  Negative for agitation and behavioral problems.      Objective:     Vital Signs (Most Recent):  Temp: 96.8 °F (36 °C) (08/13/24 1103)  Pulse: 97 (08/13/24 1103)  Resp: 20 (08/13/24 1103)  BP: (!) 169/98 (08/13/24 1103)  SpO2: 98 % (08/13/24 1103) Vital Signs (24h Range):  Temp:  [96.8 °F (36 °C)-98 °F (36.7 °C)] 96.8 °F (36 °C)  Pulse:  [] 97  Resp:  [20] 20  SpO2:  [93 %-99 %] 98 %  BP: (149-169)/(86-98) 169/98     Weight: 86.9 kg (191 lb 8 oz)  Body mass index is 27.48 kg/m².    Intake/Output Summary (Last 24 hours) at 8/13/2024 1236  Last data filed at 8/13/2024 1109  Gross per 24 hour   Intake 1880 ml   Output 8250 ml   Net -6370 ml         Physical Exam  Vitals and nursing note reviewed. Exam conducted with a chaperone present.   Constitutional:       General: He is not in acute distress.     Appearance: Normal appearance. He is normal weight. He is not ill-appearing.   Cardiovascular:      Rate and Rhythm: Normal rate and regular rhythm.      Pulses: Normal pulses.      Heart sounds: Normal heart sounds.   Pulmonary:      Effort: Pulmonary effort is normal.      Breath sounds: Normal breath sounds.   Abdominal:      General: Abdomen is flat.      Palpations: Abdomen is soft.   Musculoskeletal:      Right lower leg: Edema (right) present.      Comments: Aka left  Swollen right with lymphadema and wound, see nurses' notes and wound care notes   Skin:     General: Skin is warm and dry.      Findings: Lesion present. No erythema or rash.      Comments: Wound RLE see  nurses' notes and photos   Neurological:      General: No focal deficit present.      Mental Status: He is alert and oriented to person, place, and time. Mental status is at baseline.   Psychiatric:         Mood and Affect: Mood normal.         Behavior: Behavior normal.             Significant Labs: All pertinent labs within the past 24 hours have been reviewed.  CBC:   Recent Labs   Lab 08/12/24  0504 08/13/24  0457   WBC 9.69 10.62   HGB 8.0* 8.2*   HCT 25.9* 26.0*   * 532*     CMP:   Recent Labs   Lab 08/12/24  0504 08/13/24  0457    135*   K 4.6 4.0    106   CO2 23 29   BUN 12 17   CREATININE 0.94 0.79   CALCIUM 8.9 8.7       Significant Imaging: I have reviewed all pertinent imaging results/findings within the past 24 hours.

## 2024-08-13 NOTE — PT/OT/SLP PROGRESS
Physical Therapy Treatment    Patient Name:  Honorio Robb   MRN:  [id number]    Recommendations:     Discharge Recommendations:  (Continue to assess)  Discharge Equipment Recommendations: none  Barriers to discharge:  current medical status    Assessment:     Honorio Robb is a 58 y.o. male admitted with a medical diagnosis of Acute blood loss anemia.  He presents with the following impairments/functional limitations: weakness, impaired functional mobility, impaired balance     Pt found with HOB elevated. He is agreeable to PT tx today. Pt provided with HEP. Therapist demo exercises, and pt verbalized understanding of exercises. Will follow up tomorrow for pt to return demo of understanding.     Rehab Prognosis: Fair; patient would benefit from acute skilled PT services to address these deficits and reach maximum level of function.    Recent Surgery: * No procedures listed * 4 Days Post-Op    Plan:     During this hospitalization, patient to be seen 5 x/week (5-6x weekly/1-2x daily) to address the identified rehab impairments via gait training, therapeutic activities, therapeutic exercises and progress toward the following goals:    Plan of Care Expires:  09/06/24    Subjective     Chief Complaint: hungry  Patient/Family Comments/goals: to get better  Pain/Comfort:         Objective:     Communicated with nursing prior to session.  Patient found supine with   upon PT entry to room.     General Precautions: Standard, fall, contact  Orthopedic Precautions: N/A  Braces: N/A  Respiratory Status: Room air     Functional Mobility:  Bed Mobility:         AM-PAC 6 CLICK MOBILITY          Treatment & Education:  See above    Patient left HOB elevated with all lines intact, call button in reach, bed alarm on, and nurse notified..    GOALS:   Multidisciplinary Problems       Physical Therapy Goals          Problem: Physical Therapy    Goal Priority Disciplines Outcome Goal Variances Interventions   Physical Therapy Goal      PT, PT/OT Progressing     Description: Goals to be met by: discharge     Patient will increase functional independence with mobility by performin. Supine to sit with Contact Guard Assistance  2. Sit to supine with Contact Guard Assistance  3. Bed to chair transfer with Minimal Assistance using Slideboard                         Time Tracking:     PT Received On: 24  PT Start Time: 1352     PT Stop Time: 1400  PT Total Time (min): 8 min     Billable Minutes: Therapeutic Exercise 8    Treatment Type: Treatment  PT/PTA: PT           2024

## 2024-08-13 NOTE — PROGRESS NOTES
Inpatient Nutrition Follow-Up    Admit Date: 8/6/2024   Total duration of encounter: 7 days   Patient Age: 58 y.o.    Nutrition Recommendation/Prescription     Continue Double Protein to 2000 Diabetic Diet as medically appropriate.     Recommend consider MVI with vitamin C and Zinc as medically appropriate.     RD to add Arginaid BID once medically appropriate.     Continue to encourage PO intake and honor patient preferences.    Dietitian will monitor Energy Intake, Glucose/Endocrine Profile, Weight, Weight Change, and Wound Healing and adjust MNT as needed.     Monitoring & Evaluation     Communication of Recommendations: reviewed with nurse and reviewed with patient    Reason Seen: continuous nutrition monitoring, malnutrition screening tool (MST), and physician consult for weight loss/poor appetite.    Nutrition Risk/Follow-Up: low (follow-up in 5-7 days)     Next Date to be Seen by RD: 08/20/24  Please consult if re-assessment needed sooner.      Nutrition Assessment     Malnutrition Assessment/Nutrition-Focused Physical Exam             Weight Loss (Malnutrition):  (Per EMR patient with 18.7# weight gain over past year. Patient reports possible 40# weight loss in 2 months.) (08/08/24 0623)     Orbital Region (Subcutaneous Fat Loss): well nourished  Upper Arm Region (Subcutaneous Fat Loss): well nourished        [place] Region (Muscle Loss): well nourished  Clavicle Bone Region (Muscle Loss): well nourished  Clavicle and Acromion Bone Region (Muscle Loss): well nourished  Scapular Bone Region (Muscle Loss): well nourished                       A minimum of two characteristics is recommended for diagnosis of either severe or non-severe malnutrition.    Chart Review    Malnutrition Screening Tool Results   Have you recently lost weight without trying?: Unsure  Have you been eating poorly because of a decreased appetite?: Yes   MST Score: 3     Diagnosis:  Diabetic ulcer- right heel  Sepsis  Acute blood loss  anemia  Generalized weakness  T2DM  Hypomagnesemia-improved  Hyponatremia  Lactic acidosis- resolved  Cellulitis- right foot    Past Medical History:   Diagnosis Date    Chronic diastolic CHF (congestive heart failure)     Gas gangrene of left lower extremity     History of osteomyelitis of left lower extremity     Hyperlipidemia     Hypertension     Iron deficiency anemia     Type II diabetes mellitus     Wet gangrene of right lower extremity      Past Surgical History:   Procedure Laterality Date    BELOW THE KNEE AMPUTATION Left     FOOT AMPUTATION THROUGH ANKLE Left     FOOT AMPUTATION THROUGH METATARSAL Right 05/24/2023    Procedure: AMPUTATION, FOOT, TRANSMETATARSAL;  Surgeon: Max Duckworth MD;  Location: [facility] OR;  Service: General;  Laterality: Right;  4th and 5th toes    WOUND DEBRIDEMENT Right 05/17/2023    Procedure: DEBRIDEMENT, WOUND;  Surgeon: Max Duckworth MD;  Location: [facility] OR;  Service: General;  Laterality: Right;       Scheduled Medications:  cefTRIAXone (Rocephin) IV (PEDS and ADULTS), 2 g, Q24H  enoxparin, 40 mg, Daily    Continuous Infusions:     PRN Medications:   Current Facility-Administered Medications:     0.9%  NaCl infusion (for blood administration), , Intravenous, Q24H PRN    0.9%  NaCl infusion (for blood administration), , Intravenous, Q24H PRN    acetaminophen, 650 mg, Oral, Q8H PRN    aluminum-magnesium hydroxide-simethicone, 30 mL, Oral, QID PRN    bisacodyL, 10 mg, Rectal, Daily PRN    dextrose 10%, 12.5 g, Intravenous, PRN    dextrose 10%, 25 g, Intravenous, PRN    diphenhydrAMINE, 50 mg, Oral, On Call Procedure    diphenhydrAMINE, 50 mg, Intravenous, On Call Procedure    glucagon (human recombinant), 1 mg, Intramuscular, PRN    glucose, 16 g, Oral, PRN    glucose, 24 g, Oral, PRN    insulin aspart U-100, 0-5 Units, Subcutaneous, QID (AC + HS) PRN    melatonin, 6 mg, Oral, Nightly PRN    naloxone, 0.02 mg, Intravenous, PRN    ondansetron, 4 mg, Intravenous, Q8H PRN     predniSONE, 50 mg, Oral, On Call Procedure    promethazine, 25 mg, Oral, Q6H PRN    sodium chloride 0.9%, 10 mL, Intravenous, Q12H PRN    Calorie Containing IV Medications: no significant kcals from medications at this time    Nutrition-Related Labs:   Recent Labs   Lab 08/07/24  0734 08/08/24  0500 08/08/24  0516 08/09/24  0447 08/10/24  0748 08/11/24  0640 08/12/24  0504 08/13/24  0457   *  --  130* 134* 132* 138 136 135*   K 4.2  --  4.1 4.3 4.9 4.1 4.6 4.0   CALCIUM 8.4  --  8.2* 8.3* 8.8 9.2 8.9 8.7   MG 2.00  --  1.70*  --   --   --   --   --    CO2 24  --  21 23 19* 23 23 29   BUN 16  --  14 16 17 9 12 17   CREATININE 0.93  --  0.94 0.86 0.72 0.66 0.94 0.79   EGFRNORACEVR >90  --  >90 >90 >90 >90 >90 >90   GLUCOSE 109  --  179* 114 201* 79 116* 121*   BILITOT 0.6  --  0.5 0.4  --   --   --   --    ALKPHOS 223*  --  195* 182*  --   --   --   --    ALT 11  --  10 8  --   --   --   --    AST 15*  --  16* 16*  --   --   --   --    ALBUMIN 2.1*  --  1.9* 1.9*  --   --   --   --    WBC 12.21* 10.71  --  9.23 13.13* 10.54 9.69 10.62   HGB 6.4* 8.0*  --  8.1* 9.3* 8.2* 8.0* 8.2*   HCT 20.6* 24.4*  --  26.4* 30.1* 25.8* 25.9* 26.0*     CrCl:    Lab Value: 90.6    Date: 8/8  CrCl:    Lab Value: 107.8    Date: 8/13     Nutrition Orders:  Diet diabetic Double Protein; 2000 Calorie; Standard Tray      Appetite/Oral Intake: good/% of meals  Factors Affecting Nutritional Intake: none identified  Social Needs Impacting Access to Food:  Case management referred patient to [facility].  Food Insecurity: Food Insecurity Present (8/7/2024)    Hunger Vital Sign     Worried About Running Out of Food in the Last Year: Often true     Ran Out of Food in the Last Year: Often true     Food/Buddhist/Cultural Preferences: Food Preferences: None / Spiritual, Cultural Beliefs, Buddhist Practices, Values that Affect Care:  (None mentioned)  Food Allergies:   Review of patient's allergies indicates:   Allergen Reactions    Dilaudid  "[hydromorphone]     Iodinated contrast media          Skin Integrity: wound  Wound(s):      Wound 24 Other (comment) Right plantar;distal Heel #1-Tissue loss description: Full thickness       Wound 24 Other (comment) Right lateral Calf #2-Tissue loss description: Full thickness     Overview/Hospital Course:    (): Patient reports good appetite now but has been poor for about 2-3 weeks with possible 40# weight loss over 2 months. Per EMR patient weighed 77.5 kg on 23, CBW- 86.9 kg showing a possible 18.7# weight gain. Patient has averaged 100%-2 meals with double protein. GI: WDL except GI symptoms and INCONTINENT/LBM-, : WDL, FUNCTIONAL SCREEN:Eatin - independent, Nutrition: 3-->adequate,Jarrell Score: 17, 4+ RLE EDEMA NOTED, 24 HR I/O: 2469/1800.     (): Patient reports great appetite, eating well and averaged 96%-6 meals and has not been weighed since . Patient did not have any questions regarding diet education. GI: WDL except GI symptoms/LBM-, : WDL except VOIDING abilities and PENILE EDEMA , FUNCTIONAL SCREEN:Eatin - independent, Nutrition: 4-->excellent,Jarrell Score: 18, 3+ LLE EDEMA NOTED, 24 HR I/O: 2240/9000.      Anthropometrics    Height: 5' 10" (177.8 cm) Height Method: Estimated  Last Weight: 86.9 kg (191 lb 8 oz) (24 190) Weight Method: Bed Scale  BMI (Calculated): 27.5  BMI Classification: overweight (BMI 25-29.9)        Ideal Body Weight (IBW), Male: 166 lb     % Ideal Body Weight, Male (lb): 112.23 %                          Usual Weight Provided By: patient and EMR weight history    Wt Readings from Last 5 Encounters:   24 86.9 kg (191 lb 8 oz)   23 77.5 kg (170 lb 13.7 oz)   23 83 kg (183 lb)     Weight Change(s) Since Admission:  Admit Weight: 74.8 kg (165 lb) (24 1358)      Estimated Needs    Weight Used For Calorie Calculations: 78.1 kg (172 lb 2.9 oz) (ABW)  Energy Calorie Requirements (kcal): 7791-8286 KCAL " (24-28 KCAL/KG ABW)  Energy Need Method: Kcal/kg  Weight Used For Protein Calculations: 78.1 kg (172 lb 2.9 oz) (ABW)  Protein Requirements:  GM PRO (1.1-1.3 GM/KG ABW)  Fluid Requirements (mL): 2735 ML H2O (35 ML/KG ABW)  CHO Requirement: 188-219 GM CHO/DAY (40% KCAL FROM CHO)     Enteral Nutrition    Patient not receiving enteral nutrition at this time.    Parenteral Nutrition    Patient not receiving parenteral nutrition support at this time.    Evaluation of Received Nutrient Intake    Calories: meeting estimated needs  Protein: exceeding estimated needs    Patient Education    Not applicable.         Nutrition Diagnosis     PES: Increased nutrient needs (protein) related to increased protein energy demand for wound healing as evidenced by conditions associated with diagnosis or treatment decubitus ulcer . (active)    PES: Inadequate energy intake related to inability to consume sufficient nutrients as evidenced by weight loss. (active)      Nutrition Interventions     Intervention(s): general/healthful diet, modified composition of meals/snacks, multivitamin/mineral supplement therapy, and collaboration with other providers    Goal: Meet Greater than 75% of nutritional needs by discharge. (goal progressing)    Goal: Maintain weight throughout hospitalization. (goal progressing)    Nutrition Goals & Monitoring     Dietitian will monitor: food and beverage intake, energy intake, weight, weight change, and glucose/endocrine profile  Discharge planning:    too early to determine; pending clinical course  Next Date to be Seen by RD: 08/20/24  Please consult if re-assessment needed sooner.

## 2024-08-13 NOTE — ASSESSMENT & PLAN NOTE
An MRI of the right foot shows  osteomyelitis  general surgery for debridement.  proteus and beta hemolytic strep wound culture   Continue iv abx- Rocephin

## 2024-08-13 NOTE — PLAN OF CARE
Problem: Adult Inpatient Plan of Care  Goal: Plan of Care Review  Outcome: Progressing  Goal: Patient-Specific Goal (Individualized)  Outcome: Progressing  Goal: Absence of Hospital-Acquired Illness or Injury  Outcome: Progressing  Goal: Optimal Comfort and Wellbeing  Outcome: Progressing  Intervention: Provide Person-Centered Care  Flowsheets (Taken 8/13/2024 0521)  Trust Relationship/Rapport:   care explained   questions encouraged   questions answered  Goal: Readiness for Transition of Care  Outcome: Progressing     Problem: Diabetes Comorbidity  Goal: Blood Glucose Level Within Targeted Range  Outcome: Progressing  Intervention: Monitor and Manage Glycemia  Flowsheets (Taken 8/13/2024 0521)  Glycemic Management: blood glucose monitored     Problem: Sepsis/Septic Shock  Goal: Optimal Coping  Outcome: Progressing  Goal: Absence of Bleeding  Outcome: Progressing  Goal: Blood Glucose Level Within Targeted Range  Outcome: Progressing  Intervention: Optimize Glycemic Control  Flowsheets (Taken 8/13/2024 0521)  Glycemic Management: blood glucose monitored  Goal: Absence of Infection Signs and Symptoms  Outcome: Progressing  Goal: Optimal Nutrition Intake  Outcome: Progressing     Problem: Wound  Goal: Optimal Coping  Outcome: Progressing  Goal: Optimal Functional Ability  Outcome: Progressing  Goal: Absence of Infection Signs and Symptoms  Outcome: Progressing  Goal: Improved Oral Intake  Outcome: Progressing  Goal: Optimal Pain Control and Function  Outcome: Progressing  Goal: Skin Health and Integrity  Outcome: Progressing  Goal: Optimal Wound Healing  Outcome: Progressing     Problem: Fall Injury Risk  Goal: Absence of Fall and Fall-Related Injury  Outcome: Progressing  Intervention: Identify and Manage Contributors  Flowsheets (Taken 8/13/2024 0521)  Self-Care Promotion: independence encouraged  Medication Review/Management: medications reviewed  Intervention: Promote Injury-Free Environment  Flowsheets (Taken  8/13/2024 0521)  Safety Promotion/Fall Prevention:   assistive device/personal item within reach   bed alarm set   lighting adjusted   instructed to call staff for mobility   Fall Risk reviewed with patient/family     Problem: Skin Injury Risk Increased  Goal: Skin Health and Integrity  Outcome: Progressing     Problem: Fatigue  Goal: Improved Activity Tolerance  Outcome: Progressing     Problem: Diabetes  Goal: Optimal Coping  Outcome: Progressing  Goal: Optimal Functional Ability  Outcome: Progressing  Goal: Blood Glucose Level Within Target Range  Outcome: Progressing  Goal: Minimize Risk of Hypoglycemia  Outcome: Progressing     Problem: Anemia  Goal: Anemia Symptom Improvement  Outcome: Progressing  Intervention: Monitor and Manage Anemia  Flowsheets (Taken 8/13/2024 0521)  Safety Promotion/Fall Prevention:   assistive device/personal item within reach   bed alarm set   lighting adjusted   instructed to call staff for mobility   Fall Risk reviewed with patient/family     Problem: Heart Failure  Goal: Optimal Coping  Outcome: Progressing  Goal: Optimal Cardiac Output  Outcome: Progressing  Goal: Stable Heart Rate and Rhythm  Outcome: Progressing  Goal: Optimal Functional Ability  Outcome: Progressing  Goal: Fluid and Electrolyte Balance  Outcome: Progressing  Goal: Improved Oral Intake  Outcome: Progressing  Goal: Effective Oxygenation and Ventilation  Outcome: Progressing  Goal: Effective Breathing Pattern During Sleep  Outcome: Progressing

## 2024-08-13 NOTE — NURSING
Called [facility] ([place]) to fax over urine culture report, gave fax number and info .

## 2024-08-13 NOTE — PROGRESS NOTES
[facility] Medicine  Progress Note    Patient Name: Honorio Robb  MRN: [id number]  Patient Class: IP- Inpatient   Admission Date: 8/6/2024  Length of Stay: 7 days  Attending Physician: Denis Msoqueda MD  Primary Care Provider: Winston Lopez MD        Subjective:     Principal Problem:Acute blood loss anemia        HPI:  Mr. Robb is a 58 year old  male with a history of type II diabetes mellitus, HTN, chronic diastolic CHF, and hyperlipidemia who presented to the ER today with a 1 day history of generalized weakness. He was in his normal state of health until 2 weeks ago when he noticed right foot swelling due to a right diabetic heel ulcer. He has had bleeding and drainage from his right foot, fevers, and chills but he denies any chest pain, shortness of breath, nausea, vomiting, or abdominal pain. He developed generalized weakness yesterday which worsened today and he presented to [facility] for further evaluation. Of note, he has a history of chronic right heel ulcer with necrotic wound s/p excisional debridement on 5/17/2023 and transmetatarsal amputation of the 4th and 5th digits on 5/24/2023. On arrival to the ER he was febrile with a temperature of 103.2, tachycardic with a heart rate of 126, and tachypneic with a respiratory rate of 30 and his initial labwork was remarkable for a WBC count of 13.49, lactic acid of 2.3, ESR of 28, CRP of 8.3, hemoglobin of 5.1, hematocrit of 17.7, iron of 221, sodium of 130, glucose of 188, magnesium of 1.7, and BNP of 4820.  His CXR showed a borderline enlarged heart with vascular congestion and mild interstitial edema and x-ray of the right foot revealed diffuse soft tissue swelling and absence of visualization of the 4th and 5th metatarsals as well as the talus with only a small residual of the posterior aspect of the calcaneus remaining and remnants of the midfoot articulations associated  with caudal displacement of the distal aspect of the fibula and tibia with surrounding air lucencies extending from the inferior aspect of the tibia and fibula to the sole of the mid and hindfoot. CT of the abdomen and pelvis demonstrated enlarged inguinal and pelvic lymph nodes haziness to the subcutaneous and adipose tissue which can be seen with generalized edema. He has received vancomycin 500 mg IV and magnesium sulfate 1 gm IV x 1 in the ER. He is otherwise in stable condition and he has no other complaints today.     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  Surgery consulted and plans for debridement today  08/08/2024  wound on RLE, anemia hgb was 6, now 8. CIS consulted and does not want to risk stenting due to need for blood thinners which he most likley will not tolerate until source of bleeding has been determined.    08/09/2024 CTA shows significant osteomyelitis right LE, significant blockages, also LAD int he pelvic and iliac areas.  Source of anemia discussed with surgery and pt will have prep and colonoscopy planned for am.  Doubt candidate for vascular intervention given extent of osteomyelitis will likely need AKA so risk/isabelle of vascular intervention probably too high.  08/10/2024 pt given miralax colonoscoyp prep yesterday evening, did not have any bm.  Admitted with hgb 5, needs work up for anemia prior to determining if he could potentially have vascular intervention/stents to RLE.  There is significant osteomyelitis seen in tib fib as well as foot and calcaneous discussed with surgery may end up with AKA and revascularization may not be necessary or helpful.  Continuing iv abx for now.  Pt very active and already had left BKA would like to spare his RLE as he was working and independent prior to this episode.  08/11/2024 H&H has been stable, EGD was negative for source of bleeding, plans  for colonsocopy today.  08/12/2024 H/H stable  Colonoscopy- sigmoid diverticulosis, grade I internal hemorrhoids  Wound culture R foot- proteus and beta hemolytic strep on pen G  Working on LTAC placement   08/13/2024   H/H stable today   Pain controlled   Plan for surgery eval later today considering debdridment, I&D  Cont iv rocephin for proteus and beta hemolytic strep     Interval History:      Review of Systems   Constitutional:  Negative for appetite change, fatigue and fever.   Respiratory:  Negative for cough, shortness of breath and wheezing.    Cardiovascular:  Negative for chest pain and leg swelling.   Gastrointestinal:  Negative for abdominal distention, abdominal pain, constipation, diarrhea, nausea and vomiting.   Skin:  Negative for color change, pallor, rash and wound.   Psychiatric/Behavioral:  Negative for agitation and behavioral problems.      Objective:     Vital Signs (Most Recent):  Temp: 96.8 °F (36 °C) (08/13/24 1103)  Pulse: 97 (08/13/24 1103)  Resp: 20 (08/13/24 1103)  BP: (!) 169/98 (08/13/24 1103)  SpO2: 98 % (08/13/24 1103) Vital Signs (24h Range):  Temp:  [96.8 °F (36 °C)-98 °F (36.7 °C)] 96.8 °F (36 °C)  Pulse:  [] 97  Resp:  [20] 20  SpO2:  [93 %-99 %] 98 %  BP: (149-169)/(86-98) 169/98     Weight: 86.9 kg (191 lb 8 oz)  Body mass index is 27.48 kg/m².    Intake/Output Summary (Last 24 hours) at 8/13/2024 1236  Last data filed at 8/13/2024 1109  Gross per 24 hour   Intake 1880 ml   Output 8250 ml   Net -6370 ml         Physical Exam  Vitals and nursing note reviewed. Exam conducted with a chaperone present.   Constitutional:       General: He is not in acute distress.     Appearance: Normal appearance. He is normal weight. He is not ill-appearing.   Cardiovascular:      Rate and Rhythm: Normal rate and regular rhythm.      Pulses: Normal pulses.      Heart sounds: Normal heart sounds.   Pulmonary:      Effort: Pulmonary effort is normal.      Breath sounds: Normal breath  sounds.   Abdominal:      General: Abdomen is flat.      Palpations: Abdomen is soft.   Musculoskeletal:      Right lower leg: Edema (right) present.      Comments: Aka left  Swollen right with lymphadema and wound, see nurses' notes and wound care notes   Skin:     General: Skin is warm and dry.      Findings: Lesion present. No erythema or rash.      Comments: Wound RLE see nurses' notes and photos   Neurological:      General: No focal deficit present.      Mental Status: He is alert and oriented to person, place, and time. Mental status is at baseline.   Psychiatric:         Mood and Affect: Mood normal.         Behavior: Behavior normal.             Significant Labs: All pertinent labs within the past 24 hours have been reviewed.  CBC:   Recent Labs   Lab 08/12/24  0504 08/13/24  0457   WBC 9.69 10.62   HGB 8.0* 8.2*   HCT 25.9* 26.0*   * 532*     CMP:   Recent Labs   Lab 08/12/24  0504 08/13/24  0457    135*   K 4.6 4.0    106   CO2 23 29   BUN 12 17   CREATININE 0.94 0.79   CALCIUM 8.9 8.7       Significant Imaging: I have reviewed all pertinent imaging results/findings within the past 24 hours.    Assessment/Plan:      * Acute blood loss anemia  S/p 4 units packed RBCs   Repeat CBC in the morning.   Iron panel from 8/6/2024 was consistent with an iron deficiency anemia.  Give zen Duckworth for egd and colonoscopy- diverticulosis sigmoid, grade I int hemorrhoids  H/h stable       Chronic multifocal osteomyelitis of right foot  Acute on chronic  Will need iv abx x 6 weeks      PAD (peripheral artery disease)  US arterial abnormal to RLE  JAMAL 1.2  Likley needs revascularization will have to wait until source of bleeding has been determined per CIS      Generalized weakness  Consult PT.    Type II diabetes mellitus  Continue SSI. Hemoglobin A1c    Lab Results   Component Value Date    HGBA1C <4.0 08/06/2024         Hypomagnesemia  improved    Hyponatremia  Continue IV fluids.      Lactic acidosis  Resolved.      Sepsis    Underlying osteomyelitis right leg/foot  Based on cultures cont iv rocephin    Cellulitis of right foot  Cont iv rocephin    Diabetic ulcer of right heel  An MRI of the right foot shows  osteomyelitis  general surgery for debridement.  proteus and beta hemolytic strep wound culture   Continue iv abx- Rocephin       VTE Risk Mitigation (From admission, onward)           Ordered     enoxaparin injection 40 mg  Daily         08/06/24 1954     IP VTE HIGH RISK PATIENT  Once         08/06/24 1954                    Discharge Planning   MIRYAM: 8/16/2024     Code Status: Full Code   Is the patient medically ready for discharge?:     Reason for patient still in hospital (select all that apply): Patient trending condition, Laboratory test, Treatment, and Consult recommendations  Discharge Plan A: Long-term acute care facility (LTAC)   Discharge Delays: None known at this time              Denis Mosqueda MD  Department of Hospital Medicine   [facility]

## 2024-08-14 ENCOUNTER — ANESTHESIA EVENT (OUTPATIENT)
Dept: SURGERY | Facility: HOSPITAL | Age: 59
End: 2024-08-14
Payer: MEDICAID

## 2024-08-14 ENCOUNTER — ANESTHESIA (OUTPATIENT)
Dept: SURGERY | Facility: HOSPITAL | Age: 59
End: 2024-08-14
Payer: MEDICAID

## 2024-08-14 VITALS
HEIGHT: 70 IN | SYSTOLIC BLOOD PRESSURE: 147 MMHG | WEIGHT: 191.5 LBS | OXYGEN SATURATION: 99 % | RESPIRATION RATE: 20 BRPM | DIASTOLIC BLOOD PRESSURE: 92 MMHG | HEART RATE: 102 BPM | TEMPERATURE: 98 F | BODY MASS INDEX: 27.41 KG/M2

## 2024-08-14 LAB
ABO AND RH: NORMAL
ANION GAP SERPL CALC-SCNC: -2 MEQ/L (ref 2–13)
ANTIBODY SCREEN: NORMAL
BASOPHILS # BLD AUTO: 0.06 X10(3)/MCL (ref 0.01–0.08)
BASOPHILS NFR BLD AUTO: 0.5 % (ref 0.1–1.2)
BUN SERPL-MCNC: 18 MG/DL (ref 7–20)
CALCIUM SERPL-MCNC: 8.6 MG/DL (ref 8.4–10.2)
CHLORIDE SERPL-SCNC: 108 MMOL/L (ref 98–110)
CO2 SERPL-SCNC: 31 MMOL/L (ref 21–32)
CREAT SERPL-MCNC: 0.78 MG/DL (ref 0.66–1.25)
CREAT/UREA NIT SERPL: 23 (ref 12–20)
EOSINOPHIL # BLD AUTO: 0.5 X10(3)/MCL (ref 0.04–0.54)
EOSINOPHIL NFR BLD AUTO: 4.5 % (ref 0.7–7)
ERYTHROCYTE [DISTWIDTH] IN BLOOD BY AUTOMATED COUNT: 20.8 %
GFR SERPLBLD CREATININE-BSD FMLA CKD-EPI: >90 ML/MIN/1.73/M2
GLUCOSE SERPL-MCNC: 123 MG/DL (ref 70–115)
HCT VFR BLD AUTO: 25.8 % (ref 36–52)
HGB BLD-MCNC: 8.1 G/DL (ref 13–18)
IMM GRANULOCYTES # BLD AUTO: 0.15 X10(3)/MCL (ref 0–0.03)
IMM GRANULOCYTES NFR BLD AUTO: 1.4 % (ref 0–0.5)
LYMPHOCYTES # BLD AUTO: 1.61 X10(3)/MCL (ref 1.32–3.57)
LYMPHOCYTES NFR BLD AUTO: 14.5 % (ref 20–55)
MCH RBC QN AUTO: 26.4 PG (ref 27–34)
MCHC RBC AUTO-ENTMCNC: 31.4 G/DL (ref 31–37)
MCV RBC AUTO: 84 FL (ref 79–99)
MONOCYTES # BLD AUTO: 0.69 X10(3)/MCL (ref 0.3–0.82)
MONOCYTES NFR BLD AUTO: 6.2 % (ref 4.7–12.5)
NEUTROPHILS # BLD AUTO: 8.06 X10(3)/MCL (ref 1.78–5.38)
NEUTROPHILS NFR BLD AUTO: 72.9 % (ref 37–73)
NRBC BLD AUTO-RTO: 0 %
PLATELET # BLD AUTO: 534 X10(3)/MCL (ref 140–371)
PMV BLD AUTO: 12.5 FL (ref 9.4–12.4)
POCT GLUCOSE: 108 MG/DL (ref 70–110)
POCT GLUCOSE: 127 MG/DL (ref 70–110)
POCT GLUCOSE: 132 MG/DL (ref 70–110)
POTASSIUM SERPL-SCNC: 4.5 MMOL/L (ref 3.5–5.1)
RBC # BLD AUTO: 3.07 X10(6)/MCL (ref 4–6)
SODIUM SERPL-SCNC: 137 MMOL/L (ref 136–145)
SPECIMEN OUTDATE: NORMAL
WBC # BLD AUTO: 11.07 X10(3)/MCL (ref 4–11.5)

## 2024-08-14 PROCEDURE — 80048 BASIC METABOLIC PNL TOTAL CA: CPT | Performed by: SURGERY

## 2024-08-14 PROCEDURE — P9016 RBC LEUKOCYTES REDUCED: HCPCS | Performed by: SURGERY

## 2024-08-14 PROCEDURE — 36000707: Performed by: SURGERY

## 2024-08-14 PROCEDURE — 36430 TRANSFUSION BLD/BLD COMPNT: CPT

## 2024-08-14 PROCEDURE — 97110 THERAPEUTIC EXERCISES: CPT

## 2024-08-14 PROCEDURE — 0QBL0ZX EXCISION OF RIGHT TARSAL, OPEN APPROACH, DIAGNOSTIC: ICD-10-PCS | Performed by: SURGERY

## 2024-08-14 PROCEDURE — 86850 RBC ANTIBODY SCREEN: CPT | Performed by: SURGERY

## 2024-08-14 PROCEDURE — 25000003 PHARM REV CODE 250: Performed by: FAMILY MEDICINE

## 2024-08-14 PROCEDURE — 25000003 PHARM REV CODE 250: Performed by: SURGERY

## 2024-08-14 PROCEDURE — 63600175 PHARM REV CODE 636 W HCPCS: Performed by: NURSE ANESTHETIST, CERTIFIED REGISTERED

## 2024-08-14 PROCEDURE — 87205 SMEAR GRAM STAIN: CPT | Performed by: SURGERY

## 2024-08-14 PROCEDURE — 63600175 PHARM REV CODE 636 W HCPCS: Performed by: SURGERY

## 2024-08-14 PROCEDURE — 87181 SC STD AGAR DILUTION PER AGT: CPT | Performed by: SURGERY

## 2024-08-14 PROCEDURE — 37000008 HC ANESTHESIA 1ST 15 MINUTES: Performed by: SURGERY

## 2024-08-14 PROCEDURE — C1751 CATH, INF, PER/CENT/MIDLINE: HCPCS

## 2024-08-14 PROCEDURE — 63600175 PHARM REV CODE 636 W HCPCS: Mod: JZ,JG | Performed by: SURGERY

## 2024-08-14 PROCEDURE — 87075 CULTR BACTERIA EXCEPT BLOOD: CPT | Performed by: SURGERY

## 2024-08-14 PROCEDURE — 86923 COMPATIBILITY TEST ELECTRIC: CPT | Mod: 91 | Performed by: SURGERY

## 2024-08-14 PROCEDURE — 25000003 PHARM REV CODE 250: Performed by: NURSE ANESTHETIST, CERTIFIED REGISTERED

## 2024-08-14 PROCEDURE — 85025 COMPLETE CBC W/AUTO DIFF WBC: CPT | Performed by: SURGERY

## 2024-08-14 PROCEDURE — 37000009 HC ANESTHESIA EA ADD 15 MINS: Performed by: SURGERY

## 2024-08-14 PROCEDURE — 36000706: Performed by: SURGERY

## 2024-08-14 PROCEDURE — 63600175 PHARM REV CODE 636 W HCPCS: Performed by: FAMILY MEDICINE

## 2024-08-14 PROCEDURE — 86900 BLOOD TYPING SEROLOGIC ABO: CPT | Performed by: SURGERY

## 2024-08-14 PROCEDURE — 36415 COLL VENOUS BLD VENIPUNCTURE: CPT | Performed by: SURGERY

## 2024-08-14 PROCEDURE — S5010 5% DEXTROSE AND 0.45% SALINE: HCPCS | Performed by: NURSE ANESTHETIST, CERTIFIED REGISTERED

## 2024-08-14 PROCEDURE — 86901 BLOOD TYPING SEROLOGIC RH(D): CPT | Performed by: SURGERY

## 2024-08-14 PROCEDURE — 94761 N-INVAS EAR/PLS OXIMETRY MLT: CPT

## 2024-08-14 PROCEDURE — 36569 INSJ PICC 5 YR+ W/O IMAGING: CPT

## 2024-08-14 PROCEDURE — 87077 CULTURE AEROBIC IDENTIFY: CPT | Performed by: SURGERY

## 2024-08-14 RX ORDER — HYDROCODONE BITARTRATE AND ACETAMINOPHEN 500; 5 MG/1; MG/1
TABLET ORAL
OUTPATIENT
Start: 2024-08-14

## 2024-08-14 RX ORDER — NALOXONE HCL 0.4 MG/ML
0.02 VIAL (ML) INJECTION
Status: CANCELLED | OUTPATIENT
Start: 2024-08-14

## 2024-08-14 RX ORDER — TALC
6 POWDER (GRAM) TOPICAL NIGHTLY PRN
Status: CANCELLED | OUTPATIENT
Start: 2024-08-14

## 2024-08-14 RX ORDER — KETAMINE HYDROCHLORIDE 10 MG/ML
INJECTION, SOLUTION INTRAMUSCULAR; INTRAVENOUS
Status: DISCONTINUED | OUTPATIENT
Start: 2024-08-14 | End: 2024-08-14

## 2024-08-14 RX ORDER — DIPHENHYDRAMINE HCL 25 MG
50 CAPSULE ORAL
OUTPATIENT
Start: 2024-08-14

## 2024-08-14 RX ORDER — DIPHENHYDRAMINE HYDROCHLORIDE 50 MG/ML
50 INJECTION INTRAMUSCULAR; INTRAVENOUS
OUTPATIENT
Start: 2024-08-14

## 2024-08-14 RX ORDER — GLYCOPYRROLATE 0.2 MG/ML
0.2 INJECTION INTRAMUSCULAR; INTRAVENOUS
Status: COMPLETED | OUTPATIENT
Start: 2024-08-14 | End: 2024-08-14

## 2024-08-14 RX ORDER — ENOXAPARIN SODIUM 100 MG/ML
40 INJECTION SUBCUTANEOUS EVERY 24 HOURS
Status: CANCELLED | OUTPATIENT
Start: 2024-08-14

## 2024-08-14 RX ORDER — ALUMINUM HYDROXIDE, MAGNESIUM HYDROXIDE, AND SIMETHICONE 1200; 120; 1200 MG/30ML; MG/30ML; MG/30ML
30 SUSPENSION ORAL 4 TIMES DAILY PRN
Status: CANCELLED | OUTPATIENT
Start: 2024-08-14

## 2024-08-14 RX ORDER — LIDOCAINE HYDROCHLORIDE 20 MG/ML
INJECTION INTRAVENOUS
Status: DISCONTINUED | OUTPATIENT
Start: 2024-08-14 | End: 2024-08-14

## 2024-08-14 RX ORDER — ACETAMINOPHEN 325 MG/1
650 TABLET ORAL EVERY 8 HOURS PRN
Status: CANCELLED | OUTPATIENT
Start: 2024-08-14

## 2024-08-14 RX ORDER — PROMETHAZINE HYDROCHLORIDE 25 MG/1
25 TABLET ORAL EVERY 6 HOURS PRN
Status: CANCELLED | OUTPATIENT
Start: 2024-08-14

## 2024-08-14 RX ORDER — GLUCAGON 1 MG
1 KIT INJECTION
Status: CANCELLED | OUTPATIENT
Start: 2024-08-14

## 2024-08-14 RX ORDER — FENTANYL CITRATE 50 UG/ML
INJECTION, SOLUTION INTRAMUSCULAR; INTRAVENOUS
Status: DISCONTINUED | OUTPATIENT
Start: 2024-08-14 | End: 2024-08-14

## 2024-08-14 RX ORDER — FAMOTIDINE 20 MG/1
20 TABLET, FILM COATED ORAL
Status: COMPLETED | OUTPATIENT
Start: 2024-08-14 | End: 2024-08-14

## 2024-08-14 RX ORDER — PROPOFOL 10 MG/ML
VIAL (ML) INTRAVENOUS
Status: DISCONTINUED | OUTPATIENT
Start: 2024-08-14 | End: 2024-08-14

## 2024-08-14 RX ORDER — MIDAZOLAM HYDROCHLORIDE 1 MG/ML
2 INJECTION INTRAMUSCULAR; INTRAVENOUS
Status: COMPLETED | OUTPATIENT
Start: 2024-08-14 | End: 2024-08-14

## 2024-08-14 RX ORDER — BUPIVACAINE HYDROCHLORIDE 5 MG/ML
INJECTION, SOLUTION EPIDURAL; INTRACAUDAL
Status: DISCONTINUED | OUTPATIENT
Start: 2024-08-14 | End: 2024-08-14 | Stop reason: HOSPADM

## 2024-08-14 RX ORDER — ONDANSETRON HYDROCHLORIDE 2 MG/ML
4 INJECTION, SOLUTION INTRAVENOUS EVERY 8 HOURS PRN
Status: CANCELLED | OUTPATIENT
Start: 2024-08-14

## 2024-08-14 RX ORDER — INSULIN ASPART 100 [IU]/ML
0-5 INJECTION, SOLUTION INTRAVENOUS; SUBCUTANEOUS
Status: CANCELLED | OUTPATIENT
Start: 2024-08-14

## 2024-08-14 RX ORDER — IBUPROFEN 200 MG
24 TABLET ORAL
Status: CANCELLED | OUTPATIENT
Start: 2024-08-14

## 2024-08-14 RX ORDER — LIDOCAINE HYDROCHLORIDE AND EPINEPHRINE 10; 10 MG/ML; UG/ML
INJECTION, SOLUTION INFILTRATION; PERINEURAL
Status: DISCONTINUED | OUTPATIENT
Start: 2024-08-14 | End: 2024-08-14 | Stop reason: HOSPADM

## 2024-08-14 RX ORDER — BISACODYL 10 MG/1
10 SUPPOSITORY RECTAL DAILY PRN
Status: CANCELLED | OUTPATIENT
Start: 2024-08-14

## 2024-08-14 RX ORDER — DEXTROSE MONOHYDRATE AND SODIUM CHLORIDE 5; .45 G/100ML; G/100ML
INJECTION, SOLUTION INTRAVENOUS CONTINUOUS PRN
Status: DISCONTINUED | OUTPATIENT
Start: 2024-08-14 | End: 2024-08-14

## 2024-08-14 RX ORDER — IBUPROFEN 200 MG
16 TABLET ORAL
Status: CANCELLED | OUTPATIENT
Start: 2024-08-14

## 2024-08-14 RX ORDER — SODIUM CHLORIDE 0.9 % (FLUSH) 0.9 %
10 SYRINGE (ML) INJECTION EVERY 12 HOURS PRN
Status: CANCELLED | OUTPATIENT
Start: 2024-08-14

## 2024-08-14 RX ADMIN — DEXTROSE MONOHYDRATE 2 G: 5 INJECTION INTRAVENOUS at 10:08

## 2024-08-14 RX ADMIN — GLYCOPYRROLATE 0.2 MG: 0.2 INJECTION INTRAMUSCULAR; INTRAVENOUS at 12:08

## 2024-08-14 RX ADMIN — DEXTROSE MONOHYDRATE 5 MILLION UNITS: 5 INJECTION INTRAVENOUS at 04:08

## 2024-08-14 RX ADMIN — DEXTROSE MONOHYDRATE 5 MILLION UNITS: 5 INJECTION INTRAVENOUS at 12:08

## 2024-08-14 RX ADMIN — DEXTROSE AND SODIUM CHLORIDE: 5; 450 INJECTION, SOLUTION INTRAVENOUS at 01:08

## 2024-08-14 RX ADMIN — FENTANYL CITRATE 50 MCG: 50 INJECTION, SOLUTION INTRAMUSCULAR; INTRAVENOUS at 01:08

## 2024-08-14 RX ADMIN — ONDANSETRON 4 MG: 2 INJECTION INTRAMUSCULAR; INTRAVENOUS at 01:08

## 2024-08-14 RX ADMIN — FAMOTIDINE 20 MG: 20 TABLET, FILM COATED ORAL at 12:08

## 2024-08-14 RX ADMIN — SODIUM CHLORIDE 150 ML: 9 INJECTION, SOLUTION INTRAVENOUS at 01:08

## 2024-08-14 RX ADMIN — KETAMINE HYDROCHLORIDE 25 MG: 10 INJECTION INTRAMUSCULAR; INTRAVENOUS at 01:08

## 2024-08-14 RX ADMIN — FENTANYL CITRATE 100 MCG: 50 INJECTION, SOLUTION INTRAMUSCULAR; INTRAVENOUS at 01:08

## 2024-08-14 RX ADMIN — LIDOCAINE HYDROCHLORIDE 100 MG: 20 INJECTION, SOLUTION INTRAVENOUS at 01:08

## 2024-08-14 RX ADMIN — MIDAZOLAM HYDROCHLORIDE 2 MG: 1 INJECTION, SOLUTION INTRAMUSCULAR; INTRAVENOUS at 01:08

## 2024-08-14 RX ADMIN — PROPOFOL 110 MG: 10 INJECTION, EMULSION INTRAVENOUS at 01:08

## 2024-08-14 RX ADMIN — DEXTROSE MONOHYDRATE 5 MILLION UNITS: 5 INJECTION INTRAVENOUS at 09:08

## 2024-08-14 NOTE — NURSING TRANSFER
Nursing Transfer Note      8/14/2024   3:24 PM    Nurse giving handoff:SOLEDAD GARCIA LPN  Nurse receiving handoff:SHOAIB MOREIRA    Reason patient is being transferred: ANTIBIOTIC WOUND CARE    Transfer From: [facility]    Transfer via stretcher    Transfer with cardiac monitoring    Transported by [facility]    Transfer Vital Signs:  Blood Pressure:147/92  Heart Rate:102  O2:99  Temperature:97.9  Respirations:20      Telemetry: Rate 95  Order for Tele Monitor? Yes    Additional Lines:    4eyes on Skin: yes    Medicines sent: NO    Any special needs or follow-up needed: ELEVATE RT LEG    Patient belongings transferred with patient: Yes    Chart send with patient: No    Notified:     Patient reassessed at:  (date, time)  1  Upon arrival to floor: call bell in reach

## 2024-08-14 NOTE — ANESTHESIA POSTPROCEDURE EVALUATION
Anesthesia Post Evaluation    Patient: Honorio Robb    Procedure(s) Performed: Procedure(s) (LRB):  DEBRIDEMENT, WOUND (Right)    Final Anesthesia Type: MAC      Patient location during evaluation: floor  Patient participation: Yes- Able to Participate  Level of consciousness: awake and alert, awake and oriented  Post-procedure vital signs: reviewed and stable  Pain management: adequate  Airway patency: patent    PONV status at discharge: No PONV  Anesthetic complications: no      Cardiovascular status: blood pressure returned to baseline  Respiratory status: unassisted, room air and spontaneous ventilation  Hydration status: euvolemic  Follow-up not needed.              Vitals Value Taken Time   /84 08/14/24 1143   Temp 36.7 °C (98.1 °F) 08/14/24 1143   Pulse 101 08/14/24 1143   Resp 18 08/14/24 1143   SpO2 100 % 08/14/24 1143         No case tracking events are documented in the log.      Pain/Julio Score: No data recorded

## 2024-08-14 NOTE — PLAN OF CARE
Problem: Physical Therapy  Goal: Physical Therapy Goal  Description: Goals to be met by: discharge     Patient will increase functional independence with mobility by performin. Supine to sit with Contact Guard Assistance  2. Sit to supine with Contact Guard Assistance  3. Bed to chair transfer with Minimal Assistance using Slideboard    Outcome: Adequate for Care Transition

## 2024-08-14 NOTE — DISCHARGE SUMMARY
Hospital Medicine  Discharge Summary    Patient Name: Honorio Robb  MRN: [id number]  Admit Date: 8/6/2024  Discharge Date:  8/14/2024  Status: IP- Inpatient   Length of Stay: 8      PHYSICIANS   Admitting Physician: Tahir Ha MD  Discharging Physician: Denis Mosqueda MD.  Primary Care Physician: Winston Lopez MD        DISCHARGE DIAGNOSES    Acute blood loss anemia  S/p 4 units packed RBCs   Repeat CBC in the morning.   Iron panel from 8/6/2024 was consistent with an iron deficiency anemia.  Give zen Duckworth for egd and colonoscopy- diverticulosis sigmoid, grade I int hemorrhoids  H/h stable         Chronic multifocal osteomyelitis of right foot  Acute on chronic  Will need iv abx x 6 weeks        PAD (peripheral artery disease)  US arterial abnormal to RLE  JAMAL 1.2  Likley needs revascularization will have to wait until source of bleeding has been determined per [facility]        Generalized weakness  Consult PT.     Type II diabetes mellitus  Continue SSI. Hemoglobin A1c          Lab Results   Component Value Date     HGBA1C <4.0 08/06/2024            Hypomagnesemia  improved     Hyponatremia  Continue IV fluids.      Lactic acidosis  Resolved.       Sepsis     Underlying osteomyelitis right leg/foot  Based on cultures cont iv rocephin     Cellulitis of right foot  Cont iv rocephin     Diabetic ulcer of right heel  An MRI of the right foot shows  osteomyelitis  general surgery for debridement.  proteus and beta hemolytic strep wound culture   Continue iv abx- Rocephin   Npo for surgery today   POST op I&D      PROCEDURES   DEBRIDEMENT, WOUND (Right)         HOSPITAL COURSE    Principal Problem:Acute blood loss anemia           HPI:  Mr. Robb is a 58 year old  male with a history of type II diabetes mellitus, HTN, chronic diastolic CHF, and hyperlipidemia who presented to the ER today with a 1 day history of generalized weakness. He was in his normal state of health until  2 weeks ago when he noticed right foot swelling due to a right diabetic heel ulcer. He has had bleeding and drainage from his right foot, fevers, and chills but he denies any chest pain, shortness of breath, nausea, vomiting, or abdominal pain. He developed generalized weakness yesterday which worsened today and he presented to [facility] for further evaluation. Of note, he has a history of chronic right heel ulcer with necrotic wound s/p excisional debridement on 5/17/2023 and transmetatarsal amputation of the 4th and 5th digits on 5/24/2023. On arrival to the ER he was febrile with a temperature of 103.2, tachycardic with a heart rate of 126, and tachypneic with a respiratory rate of 30 and his initial labwork was remarkable for a WBC count of 13.49, lactic acid of 2.3, ESR of 28, CRP of 8.3, hemoglobin of 5.1, hematocrit of 17.7, iron of 221, sodium of 130, glucose of 188, magnesium of 1.7, and BNP of 4820.  His CXR showed a borderline enlarged heart with vascular congestion and mild interstitial edema and x-ray of the right foot revealed diffuse soft tissue swelling and absence of visualization of the 4th and 5th metatarsals as well as the talus with only a small residual of the posterior aspect of the calcaneus remaining and remnants of the midfoot articulations associated with caudal displacement of the distal aspect of the fibula and tibia with surrounding air lucencies extending from the inferior aspect of the tibia and fibula to the sole of the mid and hindfoot. CT of the abdomen and pelvis demonstrated enlarged inguinal and pelvic lymph nodes haziness to the subcutaneous and adipose tissue which can be seen with generalized edema. He has received vancomycin 500 mg IV and magnesium sulfate 1 gm IV x 1 in the ER. He is otherwise in stable condition and he has no other complaints today.      Overview/Hospital Course:  08/07/2024 pt admitted with wounds on LE right h/o BKA on left, states  swelling RLE started last 2-3 weeks much worse.  Surgery consulted and plans for debridement today  08/08/2024  wound on RLE, anemia hgb was 6, now 8. [facility] consulted and does not want to risk stenting due to need for blood thinners which he most likley will not tolerate until source of bleeding has been determined.    08/09/2024 CTA shows significant osteomyelitis right LE, significant blockages, also LAD int he pelvic and iliac areas.  Source of anemia discussed with surgery and pt will have prep and colonoscopy planned for am.  Doubt candidate for vascular intervention given extent of osteomyelitis will likely need AKA so risk/isabelle of vascular intervention probably too high.  08/10/2024 pt given miralax colonoscoyp prep yesterday evening, did not have any bm.  Admitted with hgb 5, needs work up for anemia prior to determining if he could potentially have vascular intervention/stents to RLE.  There is significant osteomyelitis seen in tib fib as well as foot and calcaneous discussed with surgery may end up with AKA and revascularization may not be necessary or helpful.  Continuing iv abx for now.  Pt very active and already had left BKA would like to spare his RLE as he was working and independent prior to this episode.  08/11/2024 H&H has been stable, EGD was negative for source of bleeding, plans for colonsocopy today.  08/12/2024 H/H stable  Colonoscopy- sigmoid diverticulosis, grade I internal hemorrhoids  Wound culture R foot- proteus and beta hemolytic strep on pen G  Working on LTAC placement   08/13/2024   H/H stable today   Pain controlled   Plan for surgery eval later today considering debdridment, I&D  Cont iv rocephin for proteus and beta hemolytic strep   08/14/2024  Post op I&D  H/H stable   PICC line for long term iv abx  D/c to LTAC today      STATUS  Improved, Stable    DISPOSITION  Discharge to LTAC    DIET  Cardiac     ACTIVITY  As tolerated      FOLLOW-UP         DISCHARGE MEDICATION  RECONCILIATION        Medication List      You have not been prescribed any medications.           PHYSICAL EXAM   VITALS: T 98.1 °F (36.7 °C)   BP (!) 147/84   P 101   RR 18   O2 100 %        Constitutional:       General: He is not in acute distress.     Appearance: Normal appearance. He is normal weight. He is not ill-appearing.   Cardiovascular:      Rate and Rhythm: Normal rate and regular rhythm.      Pulses: Normal pulses.      Heart sounds: Normal heart sounds.   Pulmonary:      Effort: Pulmonary effort is normal.      Breath sounds: Normal breath sounds.   Abdominal:      General: Abdomen is flat.      Palpations: Abdomen is soft.   Musculoskeletal:      Right lower leg: Edema (right) present.      Comments: Aka left  Swollen right with lymphadema and wound, see nurses' notes and wound care notes   Skin:     General: Skin is warm and dry.      Findings: Lesion present. No erythema or rash.      Comments: Wound RLE see nurses' notes and photos   Neurological:      General: No focal deficit present.      Mental Status: He is alert and oriented to person, place, and time. Mental status is at baseline.   Psychiatric:         Mood and Affect: Mood normal.         Behavior: Behavior normal.       DIAGNOSITCS   CBC:   Recent Labs   Lab 08/12/24  0504 08/13/24  0457 08/14/24  0501   WBC 9.69 10.62 11.07   HGB 8.0* 8.2* 8.1*   HCT 25.9* 26.0* 25.8*   * 532* 534*       CMP:   Recent Labs   Lab 08/08/24  0516 08/09/24  0447 08/10/24  0748 08/12/24  0504 08/13/24  0457 08/14/24  0501   CALCIUM 8.2* 8.3*   < > 8.9 8.7 8.6   ALBUMIN 1.9* 1.9*  --   --   --   --    * 134*   < > 136 135* 137   K 4.1 4.3   < > 4.6 4.0 4.5   CO2 21 23   < > 23 29 31    109   < > 109 106 108   BUN 14 16   < > 12 17 18   CREATININE 0.94 0.86   < > 0.94 0.79 0.78   ALKPHOS 195* 182*  --   --   --   --    ALT 10 8  --   --   --   --    AST 16* 16*  --   --   --   --    BILITOT 0.5 0.4  --   --   --   --    MG 1.70*  --   --    "--   --   --     < > = values in this interval not displayed.     Estimated Creatinine Clearance: 109.2 mL/min (based on SCr of 0.78 mg/dL).    Labs within the past 24 hours have been reviewed.     COAG:  No results for input(s): "APTT", "INR", "PTT" in the last 168 hours.    CARDIAC ENZYMES: No results for input(s): "TROPONINI", "CPK", "CKMB" in the last 72 hours.     No results for input(s): "AMYLASE", "LIPASE" in the last 168 hours.    Recent Labs     08/13/24  0521 08/13/24  1051 08/13/24  1538 08/14/24  0021 08/14/24  0508 08/14/24  1237   POCTGLUCOSE 146* 165* 112* 127* 132* 108        Microbiology Results (last 7 days)       Procedure Component Value Units Date/Time    Tissue Culture - Aerobic [[id number]]     Order Status: Sent Specimen: Tissue from Heel     Body Fluid Culture [[id number]]     Order Status: Sent Specimen: Body Fluid from Heel     Gram Stain [[id number]]     Order Status: Sent Specimen: Body Fluid from Heel     Anaerobic Culture [[id number]]     Order Status: Sent Specimen: Body Fluid from Heel     Blood Culture #1 **CANNOT BE ORDERED STAT** [[id number]] Collected: 08/06/24 1355    Order Status: Completed Specimen: Blood from Arm, Left Updated: 08/11/24 1501     Blood Culture No Growth at 5 days    Blood Culture #2 **CANNOT BE ORDERED STAT** [[id number]] Collected: 08/06/24 1418    Order Status: Completed Specimen: Blood from Antecubital, Right Updated: 08/11/24 1501     Blood Culture No Growth at 5 days    Wound Culture [[id number]]  (Abnormal)  (Susceptibility) Collected: 08/06/24 1407    Order Status: Completed Specimen: Drainage from Foot, Right Updated: 08/09/24 1103     Wound Culture Moderate Proteus mirabilis      Moderate Beta hemolytic streptococci    Narrative:      Due to overgrowth of Proteus identification and susceptibility cannot be performed on Beta streptococcus.             No results for input(s): "CHOL", "TRIG", "HDL", "LDL" in the last 72 hours.   Lab Results   Component " Value Date    HGBA1C <4.0 08/06/2024        Colonoscopy    Result Date: 8/11/2024  Table formatting from the original result was not included. Procedure Date 8/11/24 Impression Overall       Patient was a 58-year-old  male with a history of severe anemia and acute blood loss etiology unclear.  Patient does have ischemic gangrenous changes and osteomyelitis with chronic disease on the right foot and leg.  Patient was scheduled for colonoscopy today. The terminal ileum appeared normal. Normal terminal ileum up to 20 cm Normal cecum ascending transverse and descending colon Mild diverticulosis of the sigmoid colon Grade 1 internal hemorrhoids with good tone no masses Recommendation Follow up with me in clinic Follow up in a week to discuss results Discuss possible below or above-knee amputation of the right lower leg. Check bone marrow studies Nutritional supplementation with iron , thiamine, B12 , folate Indication None Providers Oliva Barajas, LILLIE Circulator Tyrone Franks CRNA CRNA Sabbaghian, Bahman, MD Proceduralist Daija Bowen ST Technician Medications General anesthesia. I have reviewed and agree with anesthesia's plan. See anesthesia record for details. Preprocedure A history and physical has been performed, and patient medication allergies have been reviewed. The patient's tolerance of previous anesthesia has been reviewed. The risks and benefits of the procedure and the sedation options and risks were discussed with the patient. All questions were answered and informed consent obtained. ASA Score: ASA 3 - Patient with moderate systemic disease with functional limitations Mallampati Airway Score: III (soft and hard palate and base of uvula visible) Details of the Procedure The patient underwent monitored anesthesia care, which was administered by an anesthesia professional. The patient's heart rate, blood pressure, level of consciousness, respirations, oxygen, ECG and ETCO2 were  monitored throughout the procedure. A digital rectal exam was performed. A perianal exam was performed. The scope was introduced through the anus and advanced to the terminal ileum. Retroflexion was performed in the rectum. The quality of bowel preparation was evaluated using the Harrold Bowel Preparation Scale with scores of: right colon = 3, transverse colon = 3, left colon = 3. The total BBPS score was 9. Bowel prep was adequate. The patient's estimated blood loss was minimal (<5 mL). The procedure was not difficult. The patient tolerated the procedure well. There were no apparent adverse events. Scope: Gastroscope, Colonoscope Scope Serial: [id number], [id number] Events Procedure Events Event Event Time Procedure Events Event Event Time ENDO SCOPE IN TIME 8/11/2024  1:58 PM ENDO CECUM REACHED 8/11/2024  2:01 PM ENDO SCOPE OUT TIME 8/11/2024  2:10 PM CECAL WITHDRAWAL TIME: 9m 00s Findings The terminal ileum appeared normal. Normal terminal ileum up to 20 cm Normal cecum ascending transverse and descending colon Mild diverticulosis of the sigmoid colon Grade 1 internal hemorrhoids with good tone no masses     EGD    Addendum Date:     Procedure Date 8/10/24 Impression Overall Impression:  Patient was a 58-year-old  male with a history of anemia severe hemoglobin 5.1 hematocrit 17.7 with a white count of 13.49 and a temperature of 103°.  Patient was admitted to the [facility] on 08/06/2024 2 units packed cells were given and he was started on Zosyn and vancomycin.  It was believed that his infection and chronic anemia was emanating from his right foot which appears to have osteomyelitis.  He was noted to have cultures on 08/06/2024 that demonstrated Proteus and beta-hemolytic strep.  Patient was presently on Rocephin which covers his Proteus and I added penicillin G5 million units q.6 to cover his beta-hemolytic strep.  Patient in addition to this had ultrasound with ABIs showed a 1.22 index  consistent with microvascular calcification patient had an MRI and x-rays of the foot that showed midfoot bony destruction of the talus calcaneus navicular cuneiform bones as well as the 3rd metatarsal consistent with osteomyelitis.  Patient was referred to me after 2 unit transfusion and then another 2 units on 08/07/2024 for a total of 4 units with persistent anemia etiology unclear.  Patient was was scheduled for EGD today.  Patient has a prep in progress.  He is slow to move his bowels although he has had a couple of bowel movement they are still solid.  Colonoscopy probably be done tomorrow. Duodenal in all 4 portions and into the jejunum Normal antrum fundus and cardia of the stomach Biopsy of the antrum taken for histo path and H pylori Z-line at 42 cm Biopsy of the Z-line taken for histo path 1 cm hiatal hernia Normal esophagus cricopharyngeus muscle vocal Recommendation Follow up with me in clinic Follow up in a week to discuss biopsy results of the antrum and GE junction Continue prep to check colonoscopy tomorrow morning Indication None Providers Oliva Barajas RN Circulator Tyrone Franks CRNA CRNA Sabbaghian, Bahman, MD Proceduralist Daija Bowen ST Technician Medications General anesthesia. I have reviewed and agree with anesthesia's plan. See anesthesia record for details. Preprocedure A history and physical has been performed, and patient medication allergies have been reviewed. The patient's tolerance of previous anesthesia has been reviewed. The risks and benefits of the procedure and the sedation options and risks were discussed with the patient. All questions were answered and informed consent obtained. ASA Score: ASA 2 - Patient with mild systemic disease with no functional limitations Mallampati Airway Score: II (hard and soft palate, upper portion of tonsils anduvula visible) Details of the Procedure The patient underwent monitored anesthesia care, which was administered by an  anesthesia professional. The patient's blood pressure, heart rate, level of consciousness, oxygen, respirations, ECG and ETCO2 were monitored throughout the procedure. The scope was introduced through the mouth and advanced to the third part of the duodenum. Retroflexion was performed in the cardia. Prior to the procedure, the patient's H. Pylori status was unknown. The patient's estimated blood loss was minimal (<5 mL). The procedure was not difficult. The patient tolerated the procedure well. There were no apparent adverse events. Scope: Gastroscope Scope Serial: [id number] Events Procedure Events Event Event Time Procedure Events Event Event Time SCOPE IN 8/10/2024  6:11 PM SCOPE OUT 8/10/2024  6:17 PM Findings Duodenal in all 4 portions and into the jejunum Normal antrum fundus and cardia of the stomach Biopsy of the antrum taken for histo path and H pylori Z-line at 42 cm Biopsy of the Z-line taken for histo path 1 cm hiatal hernia Normal esophagus cricopharyngeus muscle vocal     Result Date: 8/10/2024  Table formatting from the original result was not included. Procedure Date 8/10/24 Impression Overall       Patient was a 58-year-old  male with a history of anemia severe hemoglobin 5.1 hematocrit 17.7 with a white count of 13.49 and a temperature of 103°.  Patient was [facility] on 08/06/2024 2 units packed cells were given and he was started on Zosyn and vancomycin.  It was believed that his infection and chronic anemia was emanating from his right foot which appears to have osteomyelitis.  He was noted to have cultures on 08/06/2024 that demonstrated Proteus and beta-hemolytic strep.  Patient was presently on Rocephin which covers his Proteus and I added penicillin G5 million units q.6 to cover his beta-hemolytic strep.  Patient in addition to this had ultrasound with ABIs showed a 1.22 index consistent with microvascular calcification patient had an MRI and x-rays of the  foot that showed midfoot bony destruction of the talus calcaneus navicular cuneiform bones as well as the 3rd metatarsal consistent with osteomyelitis.  Patient was referred to me after 2 unit transfusion and then another 2 units on 08/07/2024 for a total of 4 units with persistent anemia etiology unclear.  Patient was was scheduled for EGD today.  Patient has a prep in progress.  He is slow to move his bowels although he has had a couple of bowel movement they are still solid.  Colonoscopy probably be done tomorrow. Duodenal in all 4 portions and into the jejunum Normal antrum fundus and cardia of the stomach Biopsy of the antrum taken for histo path and H pylori Z-line at 42 cm Biopsy of the Z-line taken for histo path 1 cm hiatal hernia Normal esophagus cricopharyngeus muscle vocal Recommendation Follow up with me in clinic Follow up in a week to discuss biopsy results of the antrum and GE junction Continue prep to check colonoscopy tomorrow morning Indication None Providers Oliva Barajas RN Circulator Tyrone Franks CRNA CRNA Max Duckworth MD Proceduralist Daija Bowen ST Technician Medications General anesthesia. I have reviewed and agree with anesthesia's plan. See anesthesia record for details. Preprocedure A history and physical has been performed, and patient medication allergies have been reviewed. The patient's tolerance of previous anesthesia has been reviewed. The risks and benefits of the procedure and the sedation options and risks were discussed with the patient. All questions were answered and informed consent obtained. ASA Score: ASA 2 - Patient with mild systemic disease with no functional limitations Mallampati Airway Score: II (hard and soft palate, upper portion of tonsils anduvula visible) Details of the Procedure The patient underwent monitored anesthesia care, which was administered by an anesthesia professional. The patient's blood pressure, heart rate, level of  consciousness, oxygen, respirations, ECG and ETCO2 were monitored throughout the procedure. The scope was introduced through the mouth and advanced to the third part of the duodenum. Retroflexion was performed in the cardia. Prior to the procedure, the patient's H. Pylori status was unknown. The patient's estimated blood loss was minimal (<5 mL). The procedure was not difficult. The patient tolerated the procedure well. There were no apparent adverse events. Scope: Gastroscope Scope Serial: [id number] Events Procedure Events Event Event Time Procedure Events Event Event Time SCOPE IN 8/10/2024  6:11 PM SCOPE OUT 8/10/2024  6:17 PM Findings Duodenal in all 4 portions and into the jejunum Normal antrum fundus and cardia of the stomach Biopsy of the antrum taken for histo path and H pylori Z-line at 42 cm Biopsy of the Z-line taken for histo path 1 cm hiatal hernia Normal esophagus cricopharyngeus muscle vocal     CTA Runoff ABD PEL Bilat Lower Ext    Result Date: 8/9/2024  EXAMINATION: CTA RUNOFF ABD PEL BILAT LOWER EXT CLINICAL HISTORY: Claudication or leg ischemia; TECHNIQUE: Axial images of the abdomen pelvis and lower extremities were obtained with IV contrast administration.  Coronal and sagittal reconstructions were provided.  Three dimensional and MIP images were obtained and evaluated.  Total DLP was 966.8 mGy-cm. Dose lowering technique and automated exposure control were utilized for this exam. COMPARISON: CT of the abdomen and pelvis 08/06/2024. FINDINGS: Vascular findings: The distal descending thoracic aorta is nonaneurysmal.  The abdominal aorta is nonaneurysmal.  The splenic artery arises directly off the aortic arch.  The common hepatic artery origin is widely patent.  The SMA is widely patent.  The single bilateral renal arteries are widely patent.  The CHRIS is patent.  There is no significant iliofemoral disease. The right common femoral artery is normal.  The right superficial femoral artery is  diminutive.  There is collateralization via a hypertrophied right internal iliac artery collateral through the posterior right thigh musculature.  This collateral is intact with no stenosis.  The right popliteal artery is normal.  The trifurcation is patent on the right.  The right anterior and posterior tibial arteries are normal.  There are patent plantar branches in the right foot. The left common femoral artery is normal.  The left SFA is normal.  The left popliteal artery is diminutive.  There are postoperative changes following left below the knee amputation. Nonvascular findings: The bilateral lung bases are clear.  The heart is not well visualized.  The visualized liver is homogeneous.  The gallbladder is contracted.  The spleen, pancreas, and adrenal glands are normal.  The bilateral kidneys are normal.  The stomach and small bowel are decompressed.  The appendix is normal.  The colon is normal.  The urinary bladder is normal.  There is enlarged right external iliac adenopathy and right common femoral adenopathy.  The largest right inguinal lymph node measures 2.8 x 2.1 cm.  There is subcutaneous inflammatory stranding in the bilateral lower extremities.  There are postoperative changes following left below the knee amputation.  There are destructive changes in the right foot with extensive soft tissue swelling and a peripherally enhancing fluid collection measuring 19 x 15 mm best visualized on series 2, image 242.  There is osseous destruction of the distal tibia fibula, calcaneus, and entirety of the foot.  There are large ulcers which abut the distal fibula.     1. Extensive right lower extremity subcutaneous swelling with a large ulceration which abuts the distal fibula.  There is osseous destruction of the distal tibia fibula and entirety of the visualized foot.  There is a focal abscess deep to the right Achilles tendon as detailed above.  This consistent with extensive osteomyelitis. 2. Diminutive  right superficial femoral artery with a hypertrophied collateral fee of the right internal iliac artery which supplies the normal appearing right popliteal artery. 3. Enlarged lymphadenopathy in the right external iliac chain and right inguinal region which may be reactive, however metastatic adenopathy cannot be excluded. Electronically signed by: Nando Hernandez MD Date:    08/09/2024 Time:    10:22    US Lower Extrem Arteries Right with JAMAL (xpd)    Result Date: 8/7/2024  EXAMINATION: US ARTERIAL LOWER EXTREMITY RIGHT WITH JAMAL (XPD) CLINICAL HISTORY: wound; COMPARISON: None FINDINGS: Waveforms and peak systollic velocities are as follows: RIGHT LOWER EXTREMITY: Common femoral artery:  Biphasic 142cm/s Profunda: Biphasic 68cm/s Proximal superficial femoral artery: Biphasic 86cm/s Mid superficial femoral artery: Biphasic 85cm/s Distal superficial femoral artery: Monophasic 120cm/s Proximal popliteal: Continuous waveforms with a peak systolic velocity of 118cm/s and end-diastolic flow of 20cm/s Distal popliteal: Continuous waveforms with a peak systolic velocity of 145cm/s and end-diastolic flow of 25 cm/s Peroneal: Continuous waveforms with a peak systolic velocity of 141cm/s and an end-diastolic flow of 32cm/s Posterior tibial: Continuous waveforms with a peak systolic velocity of 166cm/s and an end-diastolic flow of 28 cm/s Anterior tibial: Continues waveforms with a peak systolic velocity of 50cm/s and an end-diastolic flow of 10 cm/s Dorsalis pedis: cm/s (not visualized) Ankle-brachial indices  1.22     1. Biphasic flow in the arteries of the right lower extremity from the right common femoral to the distal right superficial femoral artery.  Continuous waveforms from the right popliteal to the right anterior tibial artery.  Ankle brachial index 1.22. Electronically signed by: Gregor Rodriguez Date:    08/07/2024 Time:    18:19    MRI Foot Toes WO Contrast RT    Result Date: 8/7/2024  EXAMINATION: MRI FOOT TOES WO  CONTRAST RT CLINICAL HISTORY: Osteomyelitis, foot; TECHNIQUE: MRI of the right forefoot performed without contrast using routine protocol. COMPARISON: Radiograph 08/06/2024 FINDINGS: Markedly advanced changes related to neuropathic joint including extensive bone destruction of the hindfoot and midfoot bones, disarticulation of the hindfoot with essentially complete destruction of the talus and anterior calcaneus.  There is a large ulcer in the calcaneal region which appears to approach the inferior cortical margin of the distal tibia throughout the distal.  There is suspected severe edema tibial diaphysis and metaphyseal region consistent with osteomyelitis.  Similar signal changes noted involving the distal fibula.  Sclerosis and edema also noted involving residual tarsal bone fragments including the navicular, cuneiform bones more significant laterally, proximal 3rd metatarsal.  4th metatarsa normal signal.  L. Distal phalanges appear to demonstrate Extensive severe generalized soft tissue edema.     Large ulcer in the heel region likely extending to the cortical margin of the inferior tibia and fibula with severe edema involving the distal tibia and fibula and suspected marginal erosive changes consistent with osteomyelitis. Partial destruction of the midfoot bones secondary to chronic infection versus neuropathic joint.  Similar appearing edema throughout the residual tarsal bone fragments including the navicular, cuneiform bones, and proximal 3rd metatarsal also suspicious for osteomyelitis. Electronically signed by: Kianna Becker MD Date:    08/07/2024 Time:    09:13    X-Ray Foot Complete Right    Result Date: 8/6/2024  EXAMINATION: XR FOOT COMPLETE 3 VIEW RIGHT CLINICAL HISTORY: . Osteomyelitis, unspecified TECHNIQUE: AP, lateral, and oblique views of the right foot were performed. COMPARISON: 05/16/2023 FINDINGS: Diffuse soft tissue swelling is present.  There is absence of visualization of the 4th and  5th metatarsals as well as the talus with only a small residual of the posterior aspect of the calcaneus remaining and remnants of the midfoot articulations associated with caudal displacement of the distal aspect of the fibula and tibia with surrounding air lucencies extending from the inferior aspect of the tibia and fibula to the sole of the mid and hindfoot.  I suspect the findings to be related to active osteomyelitis and cellulitis and possible abscess and/or a gangrenous process with probable active osteomyelitis superimposed a chronic osteomyelitis and postsurgical findings and probable endstage the Charcot findings.     Extensive findings as described above for which clinical correlation is recommended along with surgical consultation. Electronically signed by: Gregor Rodriguez Date:    08/06/2024 Time:    17:08    X-Ray Chest 1 View    Result Date: 8/6/2024  EXAMINATION: XR CHEST 1 VIEW CLINICAL HISTORY: Fever, unspecified TECHNIQUE: Single frontal view of the chest was performed. COMPARISON: 05/26/2023 FINDINGS: Mild interstitial edema is present.  A skinfold is noted overlying the left apex. The cardiac silhouette is borderline enlarged with vascular congestion.  The hilar and mediastinal contours are unremarkable. Bones are intact.     Borderline enlarged heart with vascular congestion and mild interstitial edema. Electronically signed by: Gregor Rodriguez Date:    08/06/2024 Time:    16:47    CT Abdomen Pelvis  Without Contrast    Result Date: 8/6/2024  EXAMINATION: CT ABDOMEN PELVIS WITHOUT CONTRAST CLINICAL HISTORY: GI bleed;With GI bleed; TECHNIQUE: Low dose axial images, sagittal and coronal reformations were obtained from the lung bases to the pubic symphysis.  Oral contrast was not administered. COMPARISON: None FINDINGS: Liver:  No clinically significant abnormalities noted. Gallbladder/Biliary System:  No clinically significant abnormalities noted. Spleen:  No clinically significant abnormalities  noted. Adrenal glands:  No clinically significant abnormalities noted. Pancreas:  No clinically significant abnormalities noted. Kidneys/Urinary Tract:  No clinically significant abnormalities noted. Urinary bladder:  No clinically significant abnormalities noted. Prostate gland/uterus and ovaries:  No clinically significant abnormalities noted. GI tract:  Fluid is present within the gastric lumen. Vascular structures:  No clinically significant abnormalities noted. Musculoskeletal structures:  No clinically significant abnormalities noted. Miscellaneous: There is haziness to the subcutaneous and adipose tissue which can be seen with generalized edema.  Enlarged lymph nodes are present in the inguinal and pelvic region.     1. Enlarged inguinal and pelvic lymph nodes. 2.  Haziness to the subcutaneous and adipose tissue which can be seen with generalized edema. n/a CATEGORY: n/a The following dose reduction techniques are used for all CT at [facility]: 1.   Automated exposure control. 2.   Adjustment of the mA and/or kV according to patient size. 3.   Use of iterative reconstruction technique. Electronically signed by: Gregor Rodriguez Date:    08/06/2024 Time:    16:25      N/A     Patient Screened for food insecurity, housing instability, transportation needs, utility difficulties, and interpersonal safety.  No needs identified    Discharge time: 33 minutes         Denis Mosqueda MD  [facility] Medicine

## 2024-08-14 NOTE — PROGRESS NOTES
[facility] Medicine  Progress Note    Patient Name: Honorio Robb  MRN: [id number]  Patient Class: IP- Inpatient   Admission Date: 8/6/2024  Length of Stay: 8 days  Attending Physician: Denis Mosqueda MD  Primary Care Provider: Winston Lopez MD        Subjective:     Principal Problem:Acute blood loss anemia        HPI:  Mr. Robb is a 58 year old  male with a history of type II diabetes mellitus, HTN, chronic diastolic CHF, and hyperlipidemia who presented to the ER today with a 1 day history of generalized weakness. He was in his normal state of health until 2 weeks ago when he noticed right foot swelling due to a right diabetic heel ulcer. He has had bleeding and drainage from his right foot, fevers, and chills but he denies any chest pain, shortness of breath, nausea, vomiting, or abdominal pain. He developed generalized weakness yesterday which worsened today and he presented to [facility] for further evaluation. Of note, he has a history of chronic right heel ulcer with necrotic wound s/p excisional debridement on 5/17/2023 and transmetatarsal amputation of the 4th and 5th digits on 5/24/2023. On arrival to the ER he was febrile with a temperature of 103.2, tachycardic with a heart rate of 126, and tachypneic with a respiratory rate of 30 and his initial labwork was remarkable for a WBC count of 13.49, lactic acid of 2.3, ESR of 28, CRP of 8.3, hemoglobin of 5.1, hematocrit of 17.7, iron of 221, sodium of 130, glucose of 188, magnesium of 1.7, and BNP of 4820.  His CXR showed a borderline enlarged heart with vascular congestion and mild interstitial edema and x-ray of the right foot revealed diffuse soft tissue swelling and absence of visualization of the 4th and 5th metatarsals as well as the talus with only a small residual of the posterior aspect of the calcaneus remaining and remnants of the midfoot articulations associated  with caudal displacement of the distal aspect of the fibula and tibia with surrounding air lucencies extending from the inferior aspect of the tibia and fibula to the sole of the mid and hindfoot. CT of the abdomen and pelvis demonstrated enlarged inguinal and pelvic lymph nodes haziness to the subcutaneous and adipose tissue which can be seen with generalized edema. He has received vancomycin 500 mg IV and magnesium sulfate 1 gm IV x 1 in the ER. He is otherwise in stable condition and he has no other complaints today.     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  Surgery consulted and plans for debridement today  08/08/2024  wound on RLE, anemia hgb was 6, now 8. [facility] consulted and does not want to risk stenting due to need for blood thinners which he most likley will not tolerate until source of bleeding has been determined.    08/09/2024 CTA shows significant osteomyelitis right LE, significant blockages, also LAD int he pelvic and iliac areas.  Source of anemia discussed with surgery and pt will have prep and colonoscopy planned for am.  Doubt candidate for vascular intervention given extent of osteomyelitis will likely need AKA so risk/isabelle of vascular intervention probably too high.  08/10/2024 pt given miralax colonoscoyp prep yesterday evening, did not have any bm.  Admitted with hgb 5, needs work up for anemia prior to determining if he could potentially have vascular intervention/stents to RLE.  There is significant osteomyelitis seen in tib fib as well as foot and calcaneous discussed with surgery may end up with AKA and revascularization may not be necessary or helpful.  Continuing iv abx for now.  Pt very active and already had left BKA would like to spare his RLE as he was working and independent prior to this episode.  08/11/2024 H&H has been stable, EGD was negative for source of bleeding, plans  for colonsocopy today.  08/12/2024 H/H stable  Colonoscopy- sigmoid diverticulosis, grade I internal hemorrhoids  Wound culture R foot- proteus and beta hemolytic strep on pen G  Working on LTAC placement   08/13/2024   H/H stable today   Pain controlled   Plan for surgery eval later today considering debdridment, I&D  Cont iv rocephin for proteus and beta hemolytic strep   08/14/2024  Npo for surgery plan for I&D  PICC line for long term iv abx    Interval History:      Review of Systems   Constitutional:  Negative for appetite change, fatigue and fever.   Respiratory:  Negative for cough, shortness of breath and wheezing.    Cardiovascular:  Negative for chest pain and leg swelling.   Gastrointestinal:  Negative for abdominal distention, abdominal pain, constipation, diarrhea, nausea and vomiting.   Skin:  Negative for color change, pallor, rash and wound.   Psychiatric/Behavioral:  Negative for agitation and behavioral problems.      Objective:     Vital Signs (Most Recent):  Temp: 98.1 °F (36.7 °C) (08/14/24 1143)  Pulse: 101 (08/14/24 1143)  Resp: 18 (08/14/24 1143)  BP: (!) 147/84 (08/14/24 1143)  SpO2: 100 % (08/14/24 1143) Vital Signs (24h Range):  Temp:  [97 °F (36.1 °C)-98.7 °F (37.1 °C)] 98.1 °F (36.7 °C)  Pulse:  [] 101  Resp:  [16-20] 18  SpO2:  [97 %-100 %] 100 %  BP: (137-157)/(81-98) 147/84     Weight: 86.9 kg (191 lb 8 oz)  Body mass index is 27.48 kg/m².    Intake/Output Summary (Last 24 hours) at 8/14/2024 1246  Last data filed at 8/14/2024 1244  Gross per 24 hour   Intake 2160 ml   Output 5200 ml   Net -3040 ml         Physical Exam  Vitals and nursing note reviewed. Exam conducted with a chaperone present.   Constitutional:       General: He is not in acute distress.     Appearance: Normal appearance. He is normal weight. He is not ill-appearing.   Cardiovascular:      Rate and Rhythm: Normal rate and regular rhythm.      Pulses: Normal pulses.      Heart sounds: Normal heart sounds.    Pulmonary:      Effort: Pulmonary effort is normal.      Breath sounds: Normal breath sounds.   Abdominal:      General: Abdomen is flat.      Palpations: Abdomen is soft.   Musculoskeletal:      Right lower leg: Edema (right) present.      Comments: Aka left  Swollen right with lymphadema and wound, see nurses' notes and wound care notes   Skin:     General: Skin is warm and dry.      Findings: Lesion present. No erythema or rash.      Comments: Wound RLE see nurses' notes and photos   Neurological:      General: No focal deficit present.      Mental Status: He is alert and oriented to person, place, and time. Mental status is at baseline.   Psychiatric:         Mood and Affect: Mood normal.         Behavior: Behavior normal.             Significant Labs: All pertinent labs within the past 24 hours have been reviewed.  CBC:   Recent Labs   Lab 08/13/24  0457 08/14/24  0501   WBC 10.62 11.07   HGB 8.2* 8.1*   HCT 26.0* 25.8*   * 534*     CMP:   Recent Labs   Lab 08/13/24 0457 08/14/24  0501   * 137   K 4.0 4.5    108   CO2 29 31   BUN 17 18   CREATININE 0.79 0.78   CALCIUM 8.7 8.6       Significant Imaging: I have reviewed all pertinent imaging results/findings within the past 24 hours.    Assessment/Plan:      * Acute blood loss anemia  S/p 4 units packed RBCs   Repeat CBC in the morning.   Iron panel from 8/6/2024 was consistent with an iron deficiency anemia.  Give zen Duckworth for egd and colonoscopy- diverticulosis sigmoid, grade I int hemorrhoids  H/h stable       Chronic multifocal osteomyelitis of right foot  Acute on chronic  Will need iv abx x 6 weeks      PAD (peripheral artery disease)  US arterial abnormal to RLE  JAMAL 1.2  Likley needs revascularization will have to wait until source of bleeding has been determined per CIS      Generalized weakness  Consult PT.    Type II diabetes mellitus  Continue SSI. Hemoglobin A1c    Lab Results   Component Value Date    HGBA1C <4.0  08/06/2024         Hypomagnesemia  improved    Hyponatremia  Continue IV fluids.     Lactic acidosis  Resolved.      Sepsis    Underlying osteomyelitis right leg/foot  Based on cultures cont iv rocephin    Cellulitis of right foot  Cont iv rocephin    Diabetic ulcer of right heel  An MRI of the right foot shows  osteomyelitis  general surgery for debridement.  proteus and beta hemolytic strep wound culture   Continue iv abx- Rocephin   Npo for surgery today       VTE Risk Mitigation (From admission, onward)           Ordered     enoxaparin injection 40 mg  Daily         08/06/24 1954     IP VTE HIGH RISK PATIENT  Once         08/06/24 1954                    Discharge Planning   MIRYAM: 8/15/2024     Code Status: Full Code   Is the patient medically ready for discharge?:     Reason for patient still in hospital (select all that apply): Patient trending condition, Laboratory test, Treatment, and Consult recommendations  Discharge Plan A: Long-term acute care facility (LTAC)   Discharge Delays: None known at this time              Denis Mosqueda MD  Department of Hospital Medicine   [facility]

## 2024-08-14 NOTE — INTERVAL H&P NOTE
The patient has been examined and the H&P has been reviewed:    I concur with the findings and no changes have occurred since H&P was written.    Anesthesia/Surgery risks, benefits and alternative options discussed and understood by patient/family.          Active Hospital Problems    Diagnosis  POA    *Acute blood loss anemia [D62]  Yes    PAD (peripheral artery disease) [I73.9]  Yes    Chronic multifocal osteomyelitis of right foot [M86.371]  Yes    Cellulitis of right foot [L03.115]  Yes    Sepsis [A41.9]  Yes    Lactic acidosis [E87.20]  Yes    Hyponatremia [E87.1]  Yes    Hypomagnesemia [E83.42]  Yes    Type II diabetes mellitus [E11.9]  Yes    Generalized weakness [R53.1]  Yes    Diabetic ulcer of right heel [E11.621, L97.419]  Yes      Resolved Hospital Problems   No resolved problems to display.

## 2024-08-14 NOTE — ANESTHESIA PREPROCEDURE EVALUATION
08/14/2024  Honorio Robb is a 58 y.o., male.    Surgical History    Procedure Laterality Date Comment Source   BELOW THE KNEE AMPUTATION Left      FOOT AMPUTATION THROUGH ANKLE Left      FOOT AMPUTATION THROUGH METATARSAL Right 05/24/2023 Procedure: AMPUTATION, FOOT, TRANSMETATARSAL;  Surgeon: Max Duckworth MD;  Location: [facility];  Service: General;  Laterality: Right;  4th and 5th toes    WOUND DEBRIDEMENT Right 05/17/2023 Procedure: DEBRIDEMENT, WOUND;  Surgeon: Max Duckworth MD;  Location: [facility] OR;  Service: General;  Laterality: Right;      Medical History    Diagnosis Date Comment Source   Chronic diastolic CHF (congestive heart failure)      Gas gangrene of left lower extremity      History of osteomyelitis of left lower extremity      Hyperlipidemia      Hypertension      Iron deficiency anemia      Type II diabetes mellitus      Wet gangrene of right lower extremity        Pre-op Assessment    I have reviewed the Patient Summary Reports.     I have reviewed the Nursing Notes. I have reviewed the NPO Status.   I have reviewed the Medications.     Review of Systems  Anesthesia Hx:  No problems with previous Anesthesia             Denies Family Hx of Anesthesia complications.    Denies Personal Hx of Anesthesia complications.                    Social:  Non-Smoker       Hematology/Oncology:    Oncology Normal    -- Anemia:               Hematology Comments: Recent blood transfusion                    EENT/Dental:  EENT/Dental Normal           Cardiovascular:  Exercise tolerance: poor   Hypertension   CAD       CHF   PVD hyperlipidemia                             Pulmonary:  Pulmonary Normal                       Renal/:  Renal/ Normal                 Hepatic/GI:  Hepatic/GI Normal                 Musculoskeletal:  Musculoskeletal Normal                Neurological:  Neurology Normal                                       Endocrine:  Diabetes, poorly controlled           Dermatological:  Skin Normal    Psych:  Psychiatric Normal                    Physical Exam  General: Well nourished, Cooperative, Alert and Oriented    Airway:  Mallampati: II / II  Mouth Opening: Normal  TM Distance: Normal  Tongue: Large  Neck ROM: Extension Decreased, Flexion Decreased    Dental:  Intact        Anesthesia Plan  Type of Anesthesia, risks & benefits discussed:    Anesthesia Type: Gen ETT, Gen Supraglottic Airway, MAC  Intra-op Monitoring Plan: Standard ASA Monitors  Post Op Pain Control Plan: multimodal analgesia  Induction:  IV  Airway Plan: Direct  Informed Consent: Informed consent signed with the Patient and all parties understand the risks and agree with anesthesia plan.  All questions answered. Patient consented to blood products? Yes  ASA Score: 4  Day of Surgery Review of History & Physical: H&P Update referred to the surgeon/provider.I have interviewed and examined the patient. I have reviewed the patient's H&P dated: There are no significant changes.     Ready For Surgery From Anesthesia Perspective.     .

## 2024-08-14 NOTE — PLAN OF CARE
08/14/24 1451   Final Note   Assessment Type Final Discharge Note   Anticipated Discharge Disposition LTAC   What phone number can be called within the next 1-3 days to see how you are doing after discharge? [phone]   Hospital Resources/Appts/Education Provided Provided patient/caregiver with written discharge plan information   Post-Acute Status   Post-Acute Authorization Placement  ([facility])   Post-Acute Placement Status Set-up Complete/Auth obtained   Coverage Healthy Blue MELISSA   Discharge Delays None known at this time

## 2024-08-14 NOTE — PT/OT/SLP PROGRESS
Physical Therapy Treatment    Patient Name:  Honorio Robb   MRN:  [id number]    Recommendations:     Discharge Recommendations:  (Continue to assess)  Discharge Equipment Recommendations: none  Barriers to discharge:  current medical status    Assessment:     Honorio Robb is a 58 y.o. male admitted with a medical diagnosis of Acute blood loss anemia.  He presents with the following impairments/functional limitations: weakness, impaired functional mobility, impaired balance     Pt found with HOB elevated. He is agreeable to PT tx today. Patient tolerated all exercises on HEP using red TB. Patient educated on proper technique and safety with all exercises. Patient to have debridement today.     Rehab Prognosis: Fair; patient would benefit from acute skilled PT services to address these deficits and reach maximum level of function.    Recent Surgery: * No procedures listed * 5 Days Post-Op    Plan:     During this hospitalization, patient to be seen 5 x/week (5-6x weekly/1-2x daily) to address the identified rehab impairments via gait training, therapeutic activities, therapeutic exercises and progress toward the following goals:    Plan of Care Expires:  09/06/24    Subjective     Chief Complaint: hungry  Patient/Family Comments/goals: to get better  Pain/Comfort:         Objective:     Communicated with nursing prior to session.  Patient found HOB elevated with peripheral IV upon PT entry to room.     General Precautions: Standard, fall, contact  Orthopedic Precautions: N/A  Braces: N/A  Respiratory Status: Room air     Functional Mobility:  Bed Mobility:     Supine to Sit: stand by assistance  Sit to Supine: stand by assistance      AM-PAC 6 CLICK MOBILITY          Treatment & Education:  See above    Patient left HOB elevated with all lines intact, call button in reach, bed alarm on, and nurse notified..    GOALS:   Multidisciplinary Problems       Physical Therapy Goals          Problem: Physical Therapy     Goal Priority Disciplines Outcome Goal Variances Interventions   Physical Therapy Goal     PT, PT/OT Progressing     Description: Goals to be met by: discharge     Patient will increase functional independence with mobility by performin. Supine to sit with Contact Guard Assistance  2. Sit to supine with Contact Guard Assistance  3. Bed to chair transfer with Minimal Assistance using Slideboard                         Time Tracking:     PT Received On: 24  PT Start Time: 0950     PT Stop Time: 1005  PT Total Time (min): 15 min     Billable Minutes: Therapeutic Exercise 15    Treatment Type: Treatment  PT/PTA: PT           2024

## 2024-08-14 NOTE — PLAN OF CARE
Problem: Adult Inpatient Plan of Care  Goal: Plan of Care Review  Outcome: Progressing  Goal: Patient-Specific Goal (Individualized)  Outcome: Progressing  Goal: Absence of Hospital-Acquired Illness or Injury  Outcome: Progressing  Goal: Optimal Comfort and Wellbeing  Outcome: Progressing  Goal: Readiness for Transition of Care  Outcome: Progressing     Problem: Diabetes Comorbidity  Goal: Blood Glucose Level Within Targeted Range  Outcome: Progressing     Problem: Sepsis/Septic Shock  Goal: Optimal Coping  Outcome: Progressing  Goal: Absence of Bleeding  Outcome: Progressing  Goal: Blood Glucose Level Within Targeted Range  Outcome: Progressing  Goal: Absence of Infection Signs and Symptoms  Outcome: Progressing  Goal: Optimal Nutrition Intake  Outcome: Progressing     Problem: Wound  Goal: Optimal Coping  Outcome: Progressing  Goal: Optimal Functional Ability  Outcome: Progressing  Goal: Absence of Infection Signs and Symptoms  Outcome: Progressing  Goal: Improved Oral Intake  Outcome: Progressing  Goal: Optimal Pain Control and Function  Outcome: Progressing  Goal: Skin Health and Integrity  Outcome: Progressing  Goal: Optimal Wound Healing  Outcome: Progressing     Problem: Fall Injury Risk  Goal: Absence of Fall and Fall-Related Injury  Outcome: Progressing     Problem: Skin Injury Risk Increased  Goal: Skin Health and Integrity  Outcome: Progressing     Problem: Fatigue  Goal: Improved Activity Tolerance  Outcome: Progressing     Problem: Diabetes  Goal: Optimal Coping  Outcome: Progressing  Goal: Optimal Functional Ability  Outcome: Progressing  Goal: Blood Glucose Level Within Target Range  Outcome: Progressing  Goal: Minimize Risk of Hypoglycemia  Outcome: Progressing     Problem: Anemia  Goal: Anemia Symptom Improvement  Outcome: Progressing     Problem: Heart Failure  Goal: Optimal Coping  Outcome: Progressing  Goal: Optimal Cardiac Output  Outcome: Progressing  Goal: Stable Heart Rate and  Rhythm  Outcome: Progressing  Goal: Optimal Functional Ability  Outcome: Progressing  Goal: Fluid and Electrolyte Balance  Outcome: Progressing  Goal: Improved Oral Intake  Outcome: Progressing  Goal: Effective Oxygenation and Ventilation  Outcome: Progressing  Goal: Effective Breathing Pattern During Sleep  Outcome: Progressing     Problem: Infection  Goal: Absence of Infection Signs and Symptoms  Outcome: Progressing

## 2024-08-14 NOTE — PLAN OF CARE
I&D completed.  placed order to discharge to [facility]. Milton at [facility] notified of discharge today. Milton stated they will call nursing for report and arrange transportation for transfer.

## 2024-08-14 NOTE — PT/OT/SLP DISCHARGE
Physical Therapy Discharge Summary    Name: Honorio Robb  MRN: [id number]   Principal Problem: Acute blood loss anemia     Patient Discharged from acute Physical Therapy on 24.  Please refer to prior PT noted date on 24 for functional status.     Assessment:     Patient appropriate for care in another setting.    Objective:     GOALS:   Multidisciplinary Problems       Physical Therapy Goals          Problem: Physical Therapy    Goal Priority Disciplines Outcome Goal Variances Interventions   Physical Therapy Goal     PT, PT/OT Adequate for Care Transition     Description: Goals to be met by: discharge     Patient will increase functional independence with mobility by performin. Supine to sit with Contact Guard Assistance  2. Sit to supine with Contact Guard Assistance  3. Bed to chair transfer with Minimal Assistance using Slideboard                         Reasons for Discontinuation of Therapy Services  Transfer to alternate level of care.      Plan:     Patient Discharged to: Long Term Acute Care.      2024

## 2024-08-14 NOTE — SUBJECTIVE & OBJECTIVE
Interval History:      Review of Systems   Constitutional:  Negative for appetite change, fatigue and fever.   Respiratory:  Negative for cough, shortness of breath and wheezing.    Cardiovascular:  Negative for chest pain and leg swelling.   Gastrointestinal:  Negative for abdominal distention, abdominal pain, constipation, diarrhea, nausea and vomiting.   Skin:  Negative for color change, pallor, rash and wound.   Psychiatric/Behavioral:  Negative for agitation and behavioral problems.      Objective:     Vital Signs (Most Recent):  Temp: 98.1 °F (36.7 °C) (08/14/24 1143)  Pulse: 101 (08/14/24 1143)  Resp: 18 (08/14/24 1143)  BP: (!) 147/84 (08/14/24 1143)  SpO2: 100 % (08/14/24 1143) Vital Signs (24h Range):  Temp:  [97 °F (36.1 °C)-98.7 °F (37.1 °C)] 98.1 °F (36.7 °C)  Pulse:  [] 101  Resp:  [16-20] 18  SpO2:  [97 %-100 %] 100 %  BP: (137-157)/(81-98) 147/84     Weight: 86.9 kg (191 lb 8 oz)  Body mass index is 27.48 kg/m².    Intake/Output Summary (Last 24 hours) at 8/14/2024 1246  Last data filed at 8/14/2024 1244  Gross per 24 hour   Intake 2160 ml   Output 5200 ml   Net -3040 ml         Physical Exam  Vitals and nursing note reviewed. Exam conducted with a chaperone present.   Constitutional:       General: He is not in acute distress.     Appearance: Normal appearance. He is normal weight. He is not ill-appearing.   Cardiovascular:      Rate and Rhythm: Normal rate and regular rhythm.      Pulses: Normal pulses.      Heart sounds: Normal heart sounds.   Pulmonary:      Effort: Pulmonary effort is normal.      Breath sounds: Normal breath sounds.   Abdominal:      General: Abdomen is flat.      Palpations: Abdomen is soft.   Musculoskeletal:      Right lower leg: Edema (right) present.      Comments: Aka left  Swollen right with lymphadema and wound, see nurses' notes and wound care notes   Skin:     General: Skin is warm and dry.      Findings: Lesion present. No erythema or rash.      Comments: Wound  RLE see nurses' notes and photos   Neurological:      General: No focal deficit present.      Mental Status: He is alert and oriented to person, place, and time. Mental status is at baseline.   Psychiatric:         Mood and Affect: Mood normal.         Behavior: Behavior normal.             Significant Labs: All pertinent labs within the past 24 hours have been reviewed.  CBC:   Recent Labs   Lab 08/13/24 0457 08/14/24  0501   WBC 10.62 11.07   HGB 8.2* 8.1*   HCT 26.0* 25.8*   * 534*     CMP:   Recent Labs   Lab 08/13/24  0457 08/14/24  0501   * 137   K 4.0 4.5    108   CO2 29 31   BUN 17 18   CREATININE 0.79 0.78   CALCIUM 8.7 8.6       Significant Imaging: I have reviewed all pertinent imaging results/findings within the past 24 hours.

## 2024-08-14 NOTE — PLAN OF CARE
Problem: Adult Inpatient Plan of Care  Goal: Plan of Care Review  Outcome: Progressing  Goal: Patient-Specific Goal (Individualized)  Outcome: Progressing  Goal: Absence of Hospital-Acquired Illness or Injury  Outcome: Progressing  Goal: Optimal Comfort and Wellbeing  Outcome: Progressing  Intervention: Provide Person-Centered Care  Flowsheets (Taken 8/14/2024 0600)  Trust Relationship/Rapport:   care explained   questions encouraged   questions answered  Goal: Readiness for Transition of Care  Outcome: Progressing     Problem: Diabetes Comorbidity  Goal: Blood Glucose Level Within Targeted Range  Outcome: Progressing  Intervention: Monitor and Manage Glycemia  Flowsheets (Taken 8/14/2024 0600)  Glycemic Management: blood glucose monitored     Problem: Sepsis/Septic Shock  Goal: Optimal Coping  Outcome: Progressing  Goal: Absence of Bleeding  Outcome: Progressing  Goal: Blood Glucose Level Within Targeted Range  Outcome: Progressing  Goal: Absence of Infection Signs and Symptoms  Outcome: Progressing  Goal: Optimal Nutrition Intake  Outcome: Progressing     Problem: Wound  Goal: Optimal Coping  Outcome: Progressing  Goal: Optimal Functional Ability  Outcome: Progressing  Goal: Absence of Infection Signs and Symptoms  Outcome: Progressing  Goal: Improved Oral Intake  Outcome: Progressing  Goal: Optimal Pain Control and Function  Outcome: Progressing  Goal: Skin Health and Integrity  Outcome: Progressing  Goal: Optimal Wound Healing  Outcome: Progressing     Problem: Fall Injury Risk  Goal: Absence of Fall and Fall-Related Injury  Outcome: Progressing  Intervention: Identify and Manage Contributors  Flowsheets (Taken 8/14/2024 0600)  Self-Care Promotion: independence encouraged  Medication Review/Management: medications reviewed     Problem: Skin Injury Risk Increased  Goal: Skin Health and Integrity  Outcome: Progressing  Intervention: Optimize Skin Protection  Flowsheets (Taken 8/14/2024 0600)  Pressure Reduction  Techniques: frequent weight shift encouraged  Pressure Reduction Devices: pressure-redistributing mattress utilized  Skin Protection: incontinence pads utilized     Problem: Fatigue  Goal: Improved Activity Tolerance  Outcome: Progressing     Problem: Diabetes  Goal: Optimal Coping  Outcome: Progressing  Goal: Optimal Functional Ability  Outcome: Progressing  Goal: Blood Glucose Level Within Target Range  Outcome: Progressing  Intervention: Optimize Glycemic Control  Flowsheets (Taken 8/14/2024 0600)  Hyperglycemia Management: blood glucose monitored  Goal: Minimize Risk of Hypoglycemia  Outcome: Progressing     Problem: Anemia  Goal: Anemia Symptom Improvement  Outcome: Progressing     Problem: Heart Failure  Goal: Optimal Coping  Outcome: Progressing  Goal: Optimal Cardiac Output  Outcome: Progressing  Goal: Stable Heart Rate and Rhythm  Outcome: Progressing  Goal: Optimal Functional Ability  Outcome: Progressing  Goal: Fluid and Electrolyte Balance  Outcome: Progressing  Goal: Improved Oral Intake  Outcome: Progressing  Goal: Effective Oxygenation and Ventilation  Outcome: Progressing  Goal: Effective Breathing Pattern During Sleep  Outcome: Progressing

## 2024-08-14 NOTE — PLAN OF CARE
08/14/24 1153   Discharge Reassessment   Assessment Type Discharge Planning Reassessment   Did the patient's condition or plan change since previous assessment? No   Discharge Plan discussed with: Patient   Communicated MIRYAM with patient/caregiver Yes   Discharge Plan A Long-term acute care facility (LTAC)   DME Needed Upon Discharge  none   Transition of Care Barriers None   Why the patient remains in the hospital Requires continued medical care   Post-Acute Status   Post-Acute Authorization Placement  ([facility])   Post-Acute Placement Status Set-up Complete/Auth obtained   Coverage Healthy Blue MELISSA   Discharge Delays None known at this time

## 2024-08-14 NOTE — PLAN OF CARE
Milton from [facility] notified that patient will have I&D today and will be ready for discharge tomorrow

## 2024-08-14 NOTE — ASSESSMENT & PLAN NOTE
An MRI of the right foot shows  osteomyelitis  general surgery for debridement.  proteus and beta hemolytic strep wound culture   Continue iv abx- Rocephin   Npo for surgery today

## 2024-08-14 NOTE — OP NOTE
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  Patient has a disconnect between his metatarsals and is calcaneus and talus bones causing a free-floating floppy foot.  Patient was essentially walking on his remaining calcaneus and talus bone that is full of osteomyelitis.      The bony infection is soft and I biopsied it with a biting rongeur several times sent it off for histo path as well as culture aerobic anaerobic Gram stain.  Patient in addition to this had debridement of the 3 remaining toenails that he had on his foot.  Patient needs a below-knee amputation.  He has been treated with antibiotics and has had substantial improvement in his leg morphology.  The edema substantially reduced in his feeling is much better.  For sure long-term IV antibiotics with pen G IV along with Rocephin I think has been very helpful given his cultures that a been sensitive to both antibiotics.        Description of Technical Procedures:  Noted       Significant Surgical Tasks Conducted by the Assistant(s), if Applicable:  None       Estimated Blood Loss (EBL): * No values recorded between 8/14/2024 12:00 AM and 8/14/2024  2:16 PM *           Implants: * No implants in log *    Specimens:   Specimen (24h ago, onward)        Start     Ordered    08/14/24 1414  Specimen to Pathology  RELEASE UPON ORDERING        References:    Click here for ordering Quick Tip   Question:  Release to patient  Answer:  Immediate    08/14/24 1414    08/14/24 1413  Specimen to Pathology General Surgery  Once        References:    Click here for ordering Quick Tip   Question Answer Comment   Procedure Type: General Surgery    Specimen Source Foot, Heel Right    Specimen Class: Complex case/Special    Procedure Type: Excision    Clinical Information: histopathology right callcaneous bone    Release to patient Immediate        08/14/24 1413                            Condition: Good    Disposition: PACU - hemodynamically stable.    Attestation: I was present and scrubbed for the entire procedure.    Discharge Note    OUTCOME: Patient tolerated treatment/procedure well without complication and is now ready for discharge.    DISPOSITION: Home or Self Care    FINAL DIAGNOSIS:  Acute blood loss anemia  Osteomyelitis of the distal foot status post biopsy of the calcaneal bone and talus bone for histo path with cultures obtained.    Debridement of 1st 2nd and 3rd toenails  FOLLOWUP: In clinic 1 week    DISCHARGE INSTRUCTIONS:    Discharge Procedure Orders   Occult blood x 1, stool   Standing Status: Future Standing Exp. Date: 08/06/25     Occult blood x 1, stool   Standing Status: Future Standing Exp. Date: 08/06/25     Occult blood x 1, stool   Standing Status: Future Standing Exp. Date: 08/06/25

## 2024-08-14 NOTE — ANESTHESIA POSTPROCEDURE EVALUATION
Anesthesia Post Evaluation    Patient: Honorio Robb    Procedure(s) Performed: Procedure(s) (LRB):  DEBRIDEMENT, WOUND (Right)    [facility] Anesthesia Post Op Evaluation      Vitals Value Taken Time   /84 08/14/24 1143   Temp 36.7 °C (98.1 °F) 08/14/24 1143   Pulse 101 08/14/24 1143   Resp 18 08/14/24 1143   SpO2 100 % 08/14/24 1143         No case tracking events are documented in the log.      Pain/Julio Score: No data recorded

## 2024-08-15 LAB
GRAM STN SPEC: NORMAL
GRAM STN SPEC: NORMAL

## 2024-08-18 LAB
ABO + RH BLD: NORMAL
ABO + RH BLD: NORMAL
BACTERIA SPEC ANAEROBE CULT: NORMAL
BACTERIA TISS AEROBE CULT: ABNORMAL
BLD PROD TYP BPU: NORMAL
BLD PROD TYP BPU: NORMAL
BLOOD UNIT EXPIRATION DATE: NORMAL
BLOOD UNIT EXPIRATION DATE: NORMAL
BLOOD UNIT TYPE CODE: 6200
BLOOD UNIT TYPE CODE: 6200
CROSSMATCH INTERPRETATION: NORMAL
CROSSMATCH INTERPRETATION: NORMAL
DISPENSE STATUS: NORMAL
DISPENSE STATUS: NORMAL
UNIT NUMBER: NORMAL
UNIT NUMBER: NORMAL

## 2025-03-27 NOTE — PROGRESS NOTES
Patient was a 58-year-old  male with a history of anemia severe hemoglobin 5.1 hematocrit 17.7 with a white count of 13.49 and a temperature of 103°.  Patient was admitted to the [facility] on 08/06/2024 2 units packed cells were given and he was started on Zosyn and vancomycin.  It was believed that his infection and chronic anemia was emanating from his right foot which appears to have osteomyelitis.  He was noted to have cultures on 08/06/2024 that demonstrated Proteus and beta-hemolytic strep.  Patient was presently on Rocephin which covers his Proteus and I added penicillin G5 million units q.6 to cover his beta-hemolytic strep.  Patient in addition to this had ultrasound with ABIs showed a 1.22 index consistent with microvascular calcification patient had an MRI and x-rays of the foot that showed midfoot bony destruction of the talus calcaneus navicular cuneiform bones as well as the 3rd metatarsal consistent with osteomyelitis.  Had a long discussion with the patient with regard to possibilities of amputation versus debridement.  He has had a previous BKA on the left side done at [facility] when he was in a diabetic coma.  A right side BKA or AKA might be necessary.  Angiographic evaluation of his lower system I think would be reasonable given his ABIs showed an index of 1.22.  Patient does not want an amputation at this point in time.  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     Plan  1. Finish transfusion  2. Discuss ABIs with Cardiology see if angiogram would be appropriate   3. Amputation has been discussed with the patient but he is definitely against it   4. Debridement of the wound and drainage would be an option   5. LTAC placement for chronic long-term IV antibiotic therapy might be a consideration as well  6. He was anemic he will be transfused  and consideration for endoscopic evaluation of the upper lower gastrointestinal tract we will be made he has never had an EGD or colonoscopy done  7. Obtained and TSH and PSA      08/07/2024   Vital signs are stable afebrile   Debridement discussions were made but I am feel fairly strongly about amputation as his best option   I am going to discuss this with a little further and see if he would be agreeable   White count 12 hemoglobin 6.4 hematocrit 20.6 and platelet count is 474 renal profile is unremarkable CO2 is up to 24 albumin is low at 2.1 consistent with chronic protein malnutrition patient was vancomycin levels were normal MRI of the feet on 08/07/2020 demonstrates a large ulcer in the heel region extending to the cortical margin of the inferior tibiofibular with severe edema of the distal tibia and fibula suspected consistent with the osteomyelitis.  Patient in addition to this has edema with necrotic partial destruction of the talus bone navicular cuneiform and 3rd metatarsal.  He has had previous amputation of the 1st and 2nd metatarsal  Cardiac workup with regard to angiography I think would be a consideration given his falsely elevated ankle-brachial indices of 1.22 consistent with diabetic microcalcification.    08/08/2024  Patient was noted to be severely anemic.  Cis was consulted with regard to angiogram and evaluation of the lower extremities.  [facility] does not want to risk stenting in providing blood thinners without a workup with regard to his anemia.  As a result he will have EGD and colonoscopy done to determine source of bleeding.  I will start prep him for colonoscopy either tomorrow or over the weekend.    08/09/2024  Vital signs are stable  White count 9 hemoglobin 8 hematocrit 26 platelet count is 5 75 renal profile is unremarkable albumin is down to 1.9  CT angio shows blockages in the iliac vessels of the pelvic region as well as osteomyelitis of the right lower extremity.  Patient will be  prep for colonoscopy in the morning.  Antibiotics have been adjusted to accommodate his infection.  Patient has been switch to Rocephin and pen G5 100 q.6 for his Proteus mirabilis and beta-hemolytic strep.         0 English

## (undated) DEVICE — PAD ABD 8X10 STERILE

## (undated) DEVICE — NDL SAFETY 22G X 1.5 ECLIPSE

## (undated) DEVICE — BANDAGE MATRIX HK LOOP 4IN 5YD

## (undated) DEVICE — NDL SAFETY STD LUER 22GX1.5IN

## (undated) DEVICE — PAD ABDOMINAL STERILE 8X10IN

## (undated) DEVICE — GOWN IMPERVIOUS FILM UNIVERSAL

## (undated) DEVICE — HEMOSTAT SURGICEL 4X8IN

## (undated) DEVICE — GLOVE 6.5 PROTEXIS PI BLUE

## (undated) DEVICE — BANDAGE ROLL COTTN 4.5INX4.1YD

## (undated) DEVICE — APPLIER LIGACLIP SM 9.38IN

## (undated) DEVICE — SYR 10CC LUER LOCK

## (undated) DEVICE — TRAY DRY SKIN SCRUB PREP

## (undated) DEVICE — BLADE SURG #15 CARBON STEEL

## (undated) DEVICE — DRAPE EXTREMITY HVY DTY REINF

## (undated) DEVICE — DRAPE EXTREMITY ORTHOMAX

## (undated) DEVICE — BNDG COFLEX FOAM LF2 ST 4X5YD

## (undated) DEVICE — SUT CTD VICRYL 0 UND BR CT

## (undated) DEVICE — SPONGE COTTON TRAY 4X4IN

## (undated) DEVICE — GAUZE DERMACEA 3 PLY 4IN 4YD

## (undated) DEVICE — GAUZE AVANT SPNG 4PLY STRL 4X4

## (undated) DEVICE — SEE MEDLINE ITEM 157216

## (undated) DEVICE — GOWN POLY REINF BRTH SLV 2XL

## (undated) DEVICE — BANDAGE MATRIX HK LOOP 6IN 5YD

## (undated) DEVICE — SUT 0 VICRYL / UR6 (J603)

## (undated) DEVICE — BLADE SAW 16.5MMX .38MM18.5MM

## (undated) DEVICE — SOCKINETTE IMPERVIOUS 12X48IN

## (undated) DEVICE — BANDAGE LITE GZ STRL 4INX4.1YD

## (undated) DEVICE — GAUZE SPONGE 4X4 12PLY